# Patient Record
Sex: MALE | Race: WHITE | Employment: FULL TIME | URBAN - METROPOLITAN AREA
[De-identification: names, ages, dates, MRNs, and addresses within clinical notes are randomized per-mention and may not be internally consistent; named-entity substitution may affect disease eponyms.]

---

## 2023-09-22 ENCOUNTER — OFFICE VISIT (OUTPATIENT)
Dept: PRIMARY CARE CLINIC | Age: 53
End: 2023-09-22
Payer: COMMERCIAL

## 2023-09-22 VITALS
SYSTOLIC BLOOD PRESSURE: 131 MMHG | OXYGEN SATURATION: 99 % | TEMPERATURE: 98.5 F | WEIGHT: 300 LBS | BODY MASS INDEX: 38.5 KG/M2 | DIASTOLIC BLOOD PRESSURE: 81 MMHG | HEIGHT: 74 IN | HEART RATE: 100 BPM

## 2023-09-22 DIAGNOSIS — L03.116 CELLULITIS OF LEFT LOWER EXTREMITY: Primary | ICD-10-CM

## 2023-09-22 PROCEDURE — 99203 OFFICE O/P NEW LOW 30 MIN: CPT | Performed by: NURSE PRACTITIONER

## 2023-09-22 RX ORDER — CEPHALEXIN 500 MG/1
500 CAPSULE ORAL 3 TIMES DAILY
Qty: 21 CAPSULE | Refills: 0 | Status: SHIPPED | OUTPATIENT
Start: 2023-09-22 | End: 2023-09-29

## 2023-09-22 ASSESSMENT — ENCOUNTER SYMPTOMS
COUGH: 0
SHORTNESS OF BREATH: 0
RESPIRATORY NEGATIVE: 1
WHEEZING: 0
CHEST TIGHTNESS: 0

## 2023-09-22 NOTE — PROGRESS NOTES
[address] [facility] WALK IN CARE  [address] [place] [address] Road  [address] [place] [address]  Dept: [phone]  Dept Fax: [phone]     Gerardo Dominguez is a 48 y.o. male who presents to the urgent care today for his medicalconditions/complaints as noted below. Gerardo Dominguez is c/o of Swelling (Lt knee x last Saturday)    HPI:      Rash  This is a new problem. The current episode started in the past 7 days. The problem has been gradually worsening since onset. Location: left lower extremity from knee to ankle. The rash is characterized by redness and swelling. It is unknown if there was an exposure to a precipitant. Pertinent negatives include no cough, fatigue, fever or shortness of breath. Treatments tried: ice, elevation. The treatment provided no relief. No past medical history on file. Current Outpatient Medications   Medication Sig Dispense Refill    cephALEXin (KEFLEX) 500 MG capsule Take 1 capsule by mouth 3 times daily for 7 days 21 capsule 0     No current facility-administered medications for this visit. No Known Allergies    Reviewed PMH, SH, and  with the patient and updated. Subjective:      Review of Systems   Constitutional:  Negative for chills, fatigue and fever. Respiratory: Negative. Negative for cough, chest tightness, shortness of breath and wheezing. Cardiovascular:  Positive for leg swelling (left leg). Negative for chest pain. Musculoskeletal:  Positive for arthralgias (left knee, mild) and joint swelling (left knee and left lower leg). Negative for gait problem. Skin:  Positive for rash (left leg from knee to ankle). All other systems reviewed and are negative. Objective:      Physical Exam  Vitals and nursing note reviewed. Constitutional:       General: He is not in acute distress. Appearance: He is well-developed. He is not diaphoretic. HENT:      Head: Normocephalic and atraumatic.    Cardiovascular:

## 2023-09-27 ENCOUNTER — HOSPITAL ENCOUNTER (INPATIENT)
Age: 53
LOS: 3 days | Discharge: HOME | DRG: 501 | End: 2023-09-30
Attending: EMERGENCY MEDICINE | Admitting: STUDENT IN AN ORGANIZED HEALTH CARE EDUCATION/TRAINING PROGRAM
Payer: COMMERCIAL

## 2023-09-27 ENCOUNTER — HOSPITAL ENCOUNTER (OUTPATIENT)
Age: 53
Setting detail: SPECIMEN
Discharge: HOME | End: 2023-09-27

## 2023-09-27 ENCOUNTER — OFFICE VISIT (OUTPATIENT)
Dept: PRIMARY CARE CLINIC | Age: 53
End: 2023-09-27
Payer: COMMERCIAL

## 2023-09-27 ENCOUNTER — HOSPITAL ENCOUNTER (OUTPATIENT)
Age: 53
Discharge: HOME | End: 2023-09-29

## 2023-09-27 VITALS
OXYGEN SATURATION: 97 % | SYSTOLIC BLOOD PRESSURE: 124 MMHG | TEMPERATURE: 99.2 F | HEART RATE: 114 BPM | DIASTOLIC BLOOD PRESSURE: 86 MMHG

## 2023-09-27 DIAGNOSIS — R22.42 LOCALIZED SWELLING OF LEFT LOWER LEG: Primary | ICD-10-CM

## 2023-09-27 DIAGNOSIS — B99.9 INFECTION: ICD-10-CM

## 2023-09-27 DIAGNOSIS — L03.116 CELLULITIS OF LEFT LOWER EXTREMITY: Primary | ICD-10-CM

## 2023-09-27 DIAGNOSIS — M79.89 LOCALIZED SWELLING OF LOWER EXTREMITY: ICD-10-CM

## 2023-09-27 DIAGNOSIS — L03.116 LEFT LEG CELLULITIS: ICD-10-CM

## 2023-09-27 DIAGNOSIS — K74.60 CIRRHOSIS OF LIVER WITHOUT ASCITES, UNSPECIFIED HEPATIC CIRRHOSIS TYPE (HCC): ICD-10-CM

## 2023-09-27 DIAGNOSIS — M71.162 SEPTIC PREPATELLAR BURSITIS OF LEFT KNEE: ICD-10-CM

## 2023-09-27 LAB
ALBUMIN SERPL-MCNC: 2.5 G/DL (ref 3.5–5.2)
ALP SERPL-CCNC: 128 U/L (ref 40–129)
ALT SERPL-CCNC: 41 U/L (ref 5–41)
ANION GAP SERPL CALCULATED.3IONS-SCNC: 8 MMOL/L (ref 9–17)
AST SERPL-CCNC: 62 U/L
BASOPHILS # BLD: 0.08 K/UL (ref 0–0.2)
BASOPHILS NFR BLD: 1 % (ref 0–2)
BILIRUB SERPL-MCNC: 4.2 MG/DL (ref 0.3–1.2)
BUN SERPL-MCNC: 11 MG/DL (ref 6–20)
BUN/CREAT SERPL: 16 (ref 9–20)
CALCIUM SERPL-MCNC: 8.4 MG/DL (ref 8.6–10.4)
CHLORIDE SERPL-SCNC: 101 MMOL/L (ref 98–107)
CO2 SERPL-SCNC: 25 MMOL/L (ref 20–31)
CREAT SERPL-MCNC: 0.7 MG/DL (ref 0.7–1.2)
D DIMER PPP FEU-MCNC: 3.92 UG/ML FEU (ref 0–0.57)
EOSINOPHIL # BLD: 0.14 K/UL (ref 0–0.44)
EOSINOPHILS RELATIVE PERCENT: 1 % (ref 1–4)
ERYTHROCYTE [DISTWIDTH] IN BLOOD BY AUTOMATED COUNT: 13.5 % (ref 11.8–14.4)
GFR SERPL CREATININE-BSD FRML MDRD: >60 ML/MIN/1.73M2
GLUCOSE SERPL-MCNC: 98 MG/DL (ref 70–99)
HCT VFR BLD AUTO: 41.5 % (ref 40.7–50.3)
HGB BLD-MCNC: 13.8 G/DL (ref 13–17)
IMM GRANULOCYTES # BLD AUTO: 0.07 K/UL (ref 0–0.3)
IMM GRANULOCYTES NFR BLD: 1 %
LACTATE BLDV-SCNC: 1.7 MMOL/L (ref 0.5–2.2)
LYMPHOCYTES NFR BLD: 1.47 K/UL (ref 1.1–3.7)
LYMPHOCYTES RELATIVE PERCENT: 10 % (ref 24–43)
MAGNESIUM SERPL-MCNC: 1.7 MG/DL (ref 1.6–2.6)
MCH RBC QN AUTO: 34 PG (ref 25.2–33.5)
MCHC RBC AUTO-ENTMCNC: 33.3 G/DL (ref 28.4–34.8)
MCV RBC AUTO: 102.2 FL (ref 82.6–102.9)
MONOCYTES NFR BLD: 1.47 K/UL (ref 0.1–1.2)
MONOCYTES NFR BLD: 10 % (ref 3–12)
NEUTROPHILS NFR BLD: 77 % (ref 36–65)
NEUTS SEG NFR BLD: 12.01 K/UL (ref 1.5–8.1)
NRBC BLD-RTO: 0 PER 100 WBC
PLATELET # BLD AUTO: 192 K/UL (ref 138–453)
PMV BLD AUTO: 9.8 FL (ref 8.1–13.5)
POTASSIUM SERPL-SCNC: 4 MMOL/L (ref 3.7–5.3)
PROT SERPL-MCNC: 6.9 G/DL (ref 6.4–8.3)
RBC # BLD AUTO: 4.06 M/UL (ref 4.21–5.77)
SODIUM SERPL-SCNC: 134 MMOL/L (ref 135–144)
URATE SERPL-MCNC: 2.4 MG/DL (ref 3.4–7)
WBC OTHER # BLD: 15.2 K/UL (ref 3.5–11.3)

## 2023-09-27 PROCEDURE — 83735 ASSAY OF MAGNESIUM: CPT

## 2023-09-27 PROCEDURE — 99285 EMERGENCY DEPT VISIT HI MDM: CPT

## 2023-09-27 PROCEDURE — 6360000002 HC RX W HCPCS: Performed by: STUDENT IN AN ORGANIZED HEALTH CARE EDUCATION/TRAINING PROGRAM

## 2023-09-27 PROCEDURE — 83605 ASSAY OF LACTIC ACID: CPT

## 2023-09-27 PROCEDURE — 87040 BLOOD CULTURE FOR BACTERIA: CPT

## 2023-09-27 PROCEDURE — 2580000003 HC RX 258: Performed by: EMERGENCY MEDICINE

## 2023-09-27 PROCEDURE — 6360000002 HC RX W HCPCS: Performed by: EMERGENCY MEDICINE

## 2023-09-27 PROCEDURE — 85025 COMPLETE CBC W/AUTO DIFF WBC: CPT

## 2023-09-27 PROCEDURE — 0MBP0ZZ EXCISION OF LEFT KNEE BURSA AND LIGAMENT, OPEN APPROACH: ICD-10-PCS | Performed by: ORTHOPAEDIC SURGERY

## 2023-09-27 PROCEDURE — 36415 COLL VENOUS BLD VENIPUNCTURE: CPT

## 2023-09-27 PROCEDURE — 99222 1ST HOSP IP/OBS MODERATE 55: CPT | Performed by: STUDENT IN AN ORGANIZED HEALTH CARE EDUCATION/TRAINING PROGRAM

## 2023-09-27 PROCEDURE — 99213 OFFICE O/P EST LOW 20 MIN: CPT | Performed by: FAMILY MEDICINE

## 2023-09-27 PROCEDURE — 96365 THER/PROPH/DIAG IV INF INIT: CPT

## 2023-09-27 PROCEDURE — 80053 COMPREHEN METABOLIC PANEL: CPT

## 2023-09-27 PROCEDURE — 1200000000 HC SEMI PRIVATE

## 2023-09-27 RX ORDER — SODIUM CHLORIDE 0.9 % (FLUSH) 0.9 %
5-40 SYRINGE (ML) INJECTION EVERY 12 HOURS SCHEDULED
Status: DISCONTINUED | OUTPATIENT
Start: 2023-09-27 | End: 2023-09-30 | Stop reason: HOSPADM

## 2023-09-27 RX ORDER — SULFAMETHOXAZOLE AND TRIMETHOPRIM 800; 160 MG/1; MG/1
1 TABLET ORAL 2 TIMES DAILY
Qty: 20 TABLET | Refills: 0 | Status: ON HOLD | OUTPATIENT
Start: 2023-09-27 | End: 2023-09-30 | Stop reason: HOSPADM

## 2023-09-27 RX ORDER — SODIUM CHLORIDE 9 MG/ML
INJECTION, SOLUTION INTRAVENOUS PRN
Status: DISCONTINUED | OUTPATIENT
Start: 2023-09-27 | End: 2023-09-30 | Stop reason: HOSPADM

## 2023-09-27 RX ORDER — ACETAMINOPHEN 650 MG/1
650 SUPPOSITORY RECTAL EVERY 6 HOURS PRN
Status: DISCONTINUED | OUTPATIENT
Start: 2023-09-27 | End: 2023-09-29

## 2023-09-27 RX ORDER — ACETAMINOPHEN 325 MG/1
650 TABLET ORAL EVERY 6 HOURS PRN
Status: DISCONTINUED | OUTPATIENT
Start: 2023-09-27 | End: 2023-09-29

## 2023-09-27 RX ORDER — KETOROLAC TROMETHAMINE 30 MG/ML
15 INJECTION, SOLUTION INTRAMUSCULAR; INTRAVENOUS EVERY 6 HOURS PRN
Status: DISCONTINUED | OUTPATIENT
Start: 2023-09-27 | End: 2023-09-28

## 2023-09-27 RX ORDER — ONDANSETRON 2 MG/ML
4 INJECTION INTRAMUSCULAR; INTRAVENOUS EVERY 6 HOURS PRN
Status: DISCONTINUED | OUTPATIENT
Start: 2023-09-27 | End: 2023-09-30 | Stop reason: HOSPADM

## 2023-09-27 RX ORDER — ENOXAPARIN SODIUM 100 MG/ML
30 INJECTION SUBCUTANEOUS 2 TIMES DAILY
Status: DISCONTINUED | OUTPATIENT
Start: 2023-09-27 | End: 2023-09-30 | Stop reason: HOSPADM

## 2023-09-27 RX ORDER — SODIUM CHLORIDE 0.9 % (FLUSH) 0.9 %
5-40 SYRINGE (ML) INJECTION PRN
Status: DISCONTINUED | OUTPATIENT
Start: 2023-09-27 | End: 2023-09-30 | Stop reason: HOSPADM

## 2023-09-27 RX ORDER — ONDANSETRON 4 MG/1
4 TABLET, ORALLY DISINTEGRATING ORAL EVERY 8 HOURS PRN
Status: DISCONTINUED | OUTPATIENT
Start: 2023-09-27 | End: 2023-09-30 | Stop reason: HOSPADM

## 2023-09-27 RX ORDER — POLYETHYLENE GLYCOL 3350 17 G/17G
17 POWDER, FOR SOLUTION ORAL DAILY PRN
Status: DISCONTINUED | OUTPATIENT
Start: 2023-09-27 | End: 2023-09-30 | Stop reason: HOSPADM

## 2023-09-27 RX ADMIN — VANCOMYCIN HYDROCHLORIDE 2500 MG: 500 INJECTION, POWDER, LYOPHILIZED, FOR SOLUTION INTRAVENOUS at 20:18

## 2023-09-27 RX ADMIN — ENOXAPARIN SODIUM 30 MG: 100 INJECTION SUBCUTANEOUS at 21:29

## 2023-09-27 ASSESSMENT — ENCOUNTER SYMPTOMS
ABDOMINAL PAIN: 0
EYE REDNESS: 0
SHORTNESS OF BREATH: 0
DIARRHEA: 0
EYE PAIN: 0
VOMITING: 0
SORE THROAT: 0
COUGH: 0
BACK PAIN: 0

## 2023-09-27 ASSESSMENT — LIFESTYLE VARIABLES
HOW OFTEN DO YOU HAVE A DRINK CONTAINING ALCOHOL: 4 OR MORE TIMES A WEEK
HOW MANY STANDARD DRINKS CONTAINING ALCOHOL DO YOU HAVE ON A TYPICAL DAY: 5 OR 6

## 2023-09-27 NOTE — ED NOTES
Pt to er with c/o redness and swelling to left knee and lower leg. Pt states s/sx started 2 weeks ago. Pt denies injury. Pt states he was seen and put on keflex for cellulitis. Pt states s/sx were not getting any better so went back to urgent care today. Pt was given bactrim and had blood work done. Pt states he was told to come to ed to rule out blood clot d/t blood work. Pt has swelling and redness to left knee and lower leg. Pt has 1+ pitting edema noted to left knee area. Pt states he has been fatigued and sleeping a lot. Pt states he has been running fever with s/sx. Pt a&ox3. Skin warm and dry. Respirations even and non-labored.        Jennifer Milton RN  09/27/23 1818

## 2023-09-27 NOTE — PATIENT INSTRUCTIONS
Stop keflex and start bactrim as prescribed for cellulitis of the left leg  Have d-dimer and uric acid level done to rule out blood clot and gout. If symptoms worsen or do not improve then go to the ER as you may need IV antibiotics.

## 2023-09-28 ENCOUNTER — APPOINTMENT (OUTPATIENT)
Dept: VASCULAR LAB | Age: 53
DRG: 501 | End: 2023-09-28
Attending: STUDENT IN AN ORGANIZED HEALTH CARE EDUCATION/TRAINING PROGRAM
Payer: COMMERCIAL

## 2023-09-28 ENCOUNTER — APPOINTMENT (OUTPATIENT)
Dept: GENERAL RADIOLOGY | Age: 53
DRG: 501 | End: 2023-09-28
Payer: COMMERCIAL

## 2023-09-28 ENCOUNTER — APPOINTMENT (OUTPATIENT)
Dept: MRI IMAGING | Age: 53
DRG: 501 | End: 2023-09-28
Payer: COMMERCIAL

## 2023-09-28 PROBLEM — R79.89 ABNORMAL LIVER FUNCTION TEST: Status: ACTIVE | Noted: 2023-09-28

## 2023-09-28 PROBLEM — F10.90 ALCOHOL USE DISORDER: Status: ACTIVE | Noted: 2023-09-28

## 2023-09-28 PROBLEM — M70.52 PATELLAR BURSITIS OF LEFT KNEE: Status: ACTIVE | Noted: 2023-09-28

## 2023-09-28 PROBLEM — E66.01 CLASS 3 SEVERE OBESITY DUE TO EXCESS CALORIES WITHOUT SERIOUS COMORBIDITY IN ADULT (HCC): Status: ACTIVE | Noted: 2023-09-28

## 2023-09-28 PROBLEM — R03.0 ELEVATED BLOOD PRESSURE READING: Status: ACTIVE | Noted: 2023-09-28

## 2023-09-28 LAB
ANION GAP SERPL CALCULATED.3IONS-SCNC: 9 MMOL/L (ref 9–17)
BUN SERPL-MCNC: 11 MG/DL (ref 6–20)
BUN/CREAT SERPL: 22 (ref 9–20)
CALCIUM SERPL-MCNC: 8.3 MG/DL (ref 8.6–10.4)
CHLORIDE SERPL-SCNC: 102 MMOL/L (ref 98–107)
CO2 SERPL-SCNC: 25 MMOL/L (ref 20–31)
CREAT SERPL-MCNC: 0.5 MG/DL (ref 0.7–1.2)
ECHO BSA: 2.73 M2
ERYTHROCYTE [DISTWIDTH] IN BLOOD BY AUTOMATED COUNT: 13.8 % (ref 11.8–14.4)
GFR SERPL CREATININE-BSD FRML MDRD: >60 ML/MIN/1.73M2
GLUCOSE SERPL-MCNC: 82 MG/DL (ref 70–99)
HCT VFR BLD AUTO: 39.9 % (ref 40.7–50.3)
HGB BLD-MCNC: 13.1 G/DL (ref 13–17)
INR PPP: 1.9
MCH RBC QN AUTO: 34.6 PG (ref 25.2–33.5)
MCHC RBC AUTO-ENTMCNC: 32.8 G/DL (ref 28.4–34.8)
MCV RBC AUTO: 105.3 FL (ref 82.6–102.9)
NRBC BLD-RTO: 0 PER 100 WBC
PHOSPHATE SERPL-MCNC: 3.2 MG/DL (ref 2.5–4.5)
PLATELET # BLD AUTO: 151 K/UL (ref 138–453)
PMV BLD AUTO: 10.2 FL (ref 8.1–13.5)
POTASSIUM SERPL-SCNC: 4.1 MMOL/L (ref 3.7–5.3)
PROTHROMBIN TIME: 21.6 SEC (ref 11.5–14.2)
RBC # BLD AUTO: 3.79 M/UL (ref 4.21–5.77)
SODIUM SERPL-SCNC: 136 MMOL/L (ref 135–144)
VANCOMYCIN SERPL-MCNC: 24.6 UG/ML
VANCOMYCIN SERPL-MCNC: 8.2 UG/ML
WBC OTHER # BLD: 19.1 K/UL (ref 3.5–11.3)

## 2023-09-28 PROCEDURE — 99222 1ST HOSP IP/OBS MODERATE 55: CPT | Performed by: INTERNAL MEDICINE

## 2023-09-28 PROCEDURE — 6360000002 HC RX W HCPCS: Performed by: INTERNAL MEDICINE

## 2023-09-28 PROCEDURE — 85610 PROTHROMBIN TIME: CPT

## 2023-09-28 PROCEDURE — 99232 SBSQ HOSP IP/OBS MODERATE 35: CPT | Performed by: INTERNAL MEDICINE

## 2023-09-28 PROCEDURE — 93971 EXTREMITY STUDY: CPT | Performed by: SURGERY

## 2023-09-28 PROCEDURE — 6360000002 HC RX W HCPCS: Performed by: STUDENT IN AN ORGANIZED HEALTH CARE EDUCATION/TRAINING PROGRAM

## 2023-09-28 PROCEDURE — 2580000003 HC RX 258: Performed by: INTERNAL MEDICINE

## 2023-09-28 PROCEDURE — 84100 ASSAY OF PHOSPHORUS: CPT

## 2023-09-28 PROCEDURE — 73721 MRI JNT OF LWR EXTRE W/O DYE: CPT

## 2023-09-28 PROCEDURE — 99254 IP/OBS CNSLTJ NEW/EST MOD 60: CPT | Performed by: ORTHOPAEDIC SURGERY

## 2023-09-28 PROCEDURE — 80048 BASIC METABOLIC PNL TOTAL CA: CPT

## 2023-09-28 PROCEDURE — 6370000000 HC RX 637 (ALT 250 FOR IP): Performed by: INTERNAL MEDICINE

## 2023-09-28 PROCEDURE — 80202 ASSAY OF VANCOMYCIN: CPT

## 2023-09-28 PROCEDURE — 73562 X-RAY EXAM OF KNEE 3: CPT

## 2023-09-28 PROCEDURE — 1200000000 HC SEMI PRIVATE

## 2023-09-28 PROCEDURE — 93971 EXTREMITY STUDY: CPT

## 2023-09-28 PROCEDURE — 2580000003 HC RX 258: Performed by: STUDENT IN AN ORGANIZED HEALTH CARE EDUCATION/TRAINING PROGRAM

## 2023-09-28 PROCEDURE — 85027 COMPLETE CBC AUTOMATED: CPT

## 2023-09-28 PROCEDURE — 36415 COLL VENOUS BLD VENIPUNCTURE: CPT

## 2023-09-28 RX ADMIN — ENOXAPARIN SODIUM 30 MG: 100 INJECTION SUBCUTANEOUS at 10:06

## 2023-09-28 RX ADMIN — VANCOMYCIN HYDROCHLORIDE 1250 MG: 5 INJECTION, POWDER, LYOPHILIZED, FOR SOLUTION INTRAVENOUS at 10:32

## 2023-09-28 RX ADMIN — SODIUM CHLORIDE, PRESERVATIVE FREE 10 ML: 5 INJECTION INTRAVENOUS at 20:53

## 2023-09-28 RX ADMIN — PIPERACILLIN AND TAZOBACTAM 3375 MG: 3; .375 INJECTION, POWDER, LYOPHILIZED, FOR SOLUTION INTRAVENOUS at 05:59

## 2023-09-28 RX ADMIN — CEFTRIAXONE SODIUM 1000 MG: 1 INJECTION, POWDER, FOR SOLUTION INTRAMUSCULAR; INTRAVENOUS at 09:38

## 2023-09-28 RX ADMIN — ENOXAPARIN SODIUM 30 MG: 100 INJECTION SUBCUTANEOUS at 20:53

## 2023-09-28 RX ADMIN — Medication 10 MG: at 18:45

## 2023-09-28 RX ADMIN — SODIUM CHLORIDE 25 ML: 9 INJECTION, SOLUTION INTRAVENOUS at 09:36

## 2023-09-28 RX ADMIN — PIPERACILLIN AND TAZOBACTAM 4500 MG: 4; .5 INJECTION, POWDER, LYOPHILIZED, FOR SOLUTION INTRAVENOUS at 00:16

## 2023-09-28 RX ADMIN — SODIUM CHLORIDE, PRESERVATIVE FREE 10 ML: 5 INJECTION INTRAVENOUS at 09:33

## 2023-09-28 RX ADMIN — VANCOMYCIN HYDROCHLORIDE 1250 MG: 5 INJECTION, POWDER, LYOPHILIZED, FOR SOLUTION INTRAVENOUS at 20:56

## 2023-09-28 RX ADMIN — SODIUM CHLORIDE, PRESERVATIVE FREE 10 ML: 5 INJECTION INTRAVENOUS at 05:57

## 2023-09-28 NOTE — H&P
[facility]  Office: [phone]  Maryse Mehnaz, DO, Leo Curet, DO, Marino Hidalgo, DO, Jil Cole, DO, Layla Haney MD, Chris Clark MD, Jacqueline De La Paz MD, Aleksandr Shepherd MD,  Faustino Anderson MD, Rc Lubin MD, Donna Marie, DO, Orestes Riley MD,  Juan Caban MD, Soha Hobson MD, Ana Marroquin DO, Severo Arceo MD,  Cira Beltran DO, Carlos Hobbs MD, Rai Jain MD, Mary Henderson MD, Tadeo Verma MD,  Vesta Munoz MD, Tyrone Landeros MD, Carole Antonio MD, Arelis Walls MD, Vito Lozano, DO, Philis Halsted, MD,  Yola Martinez MD, Orlando Wright MD, Rick Rivera, CNP,  Eneida Curry, CNP,, Taqueria Ray, CNP,  Cheryle Balding, DNP, Valdemar Peabody, CNP, Esau Sinclair, CNP, Genny Coles, CNP, Stephanie Erickson, CNP, Yeny Rose, CNP, Christy Sullivan, CNP, Maryana French, CNS, Haile Gallego, CNP, Ally Castro, [facility]    HISTORY AND PHYSICAL EXAMINATION            Date:   9/27/2023  Patient name:  Steffany Chambers  Date of admission:  9/27/2023  5:47 PM  MRN:   [id number]  Account:  [de-identified]  YOB: 1970  PCP:    No primary care provider on file. Room:   [place] [address]  Code Status:    Full Code    Chief Complaint:     Chief Complaint   Patient presents with    Leg Swelling     Pt states he has been having Lt leg swelling for a few a few months, off and on antibiotics, possible cellulitis. History Obtained From:     patient and spouse    History of Present Illness:     Steffany Chambers is a 48 y.o. Non- / non  male who presents with Leg Swelling (Pt states he has been having Lt leg swelling for a few a few months, off and on antibiotics, possible cellulitis. )   and is admitted to the hospital for the management of Left leg cellulitis.     59-year-old male with no significant past medical history presents emergency department for left lower Range    Uric Acid 2.4 (L) 3.4 - 7.0 mg/dL   D-Dimer, Quantitative    Collection Time: 09/27/23 12:56 PM   Result Value Ref Range    D-Dimer, Quant 3.92 (H) 0.00 - 0.57 ug/mL FEU   CBC with Auto Differential    Collection Time: 09/27/23  6:48 PM   Result Value Ref Range    WBC 15.2 (H) 3.5 - 11.3 k/uL    RBC 4.06 (L) 4.21 - 5.77 m/uL    Hemoglobin 13.8 13.0 - 17.0 g/dL    Hematocrit 41.5 40.7 - 50.3 %    .2 82.6 - 102.9 fL    MCH 34.0 (H) 25.2 - 33.5 pg    MCHC 33.3 28.4 - 34.8 g/dL    RDW 13.5 11.8 - 14.4 %    Platelets 317 169 - 789 k/uL    MPV 9.8 8.1 - 13.5 fL    NRBC Automated 0.0 0.0 per 100 WBC    Neutrophils % 77 (H) 36 - 65 %    Lymphocytes % 10 (L) 24 - 43 %    Monocytes % 10 3 - 12 %    Eosinophils % 1 1 - 4 %    Basophils % 1 0 - 2 %    Immature Granulocytes 1 (H) 0 %    Neutrophils Absolute 12.01 (H) 1.50 - 8.10 k/uL    Lymphocytes Absolute 1.47 1.10 - 3.70 k/uL    Monocytes Absolute 1.47 (H) 0.10 - 1.20 k/uL    Eosinophils Absolute 0.14 0.00 - 0.44 k/uL    Basophils Absolute 0.08 0.00 - 0.20 k/uL    Absolute Immature Granulocyte 0.07 0.00 - 0.30 k/uL   CMP    Collection Time: 09/27/23  6:48 PM   Result Value Ref Range    Sodium 134 (L) 135 - 144 mmol/L    Potassium 4.0 3.7 - 5.3 mmol/L    Chloride 101 98 - 107 mmol/L    CO2 25 20 - 31 mmol/L    Anion Gap 8 (L) 9 - 17 mmol/L    Glucose 98 70 - 99 mg/dL    BUN 11 6 - 20 mg/dL    Creatinine 0.7 0.7 - 1.2 mg/dL    Est, Glom Filt Rate >60 >60 mL/min/1.73m2    Bun/Cre Ratio 16 9 - 20    Calcium 8.4 (L) 8.6 - 10.4 mg/dL    Total Protein 6.9 6.4 - 8.3 g/dL    Albumin 2.5 (L) 3.5 - 5.2 g/dL    Total Bilirubin 4.2 (H) 0.3 - 1.2 mg/dL    Alkaline Phosphatase 128 40 - 129 U/L    ALT 41 5 - 41 U/L    AST 62 (H) <40 U/L   Magnesium    Collection Time: 09/27/23  6:48 PM   Result Value Ref Range    Magnesium 1.7 1.6 - 2.6 mg/dL   Lactic Acid    Collection Time: 09/27/23  6:48 PM   Result Value Ref Range    Lactic Acid 1.7 0.5 - 2.2 mmol/L

## 2023-09-28 NOTE — PROGRESS NOTES
[facility]  Office: [phone]  Felecia Flynn, DO, Jax Pelayo, DO, Perri Cole, DO, Ellis Nicolas MD, Kyle Mobley MD, Briana Henriquez MD, Arcelia Libman, MD,  Loreto Lake MD, Yousif Bridges MD, Katie French DO, Genevieve Chirinos MD,  Dawson Sandy MD, Rosita Chand MD, Richard Florian DO, Araceli Rojas MD,  Didier Maria DO, Rudy Lott MD, Yaquelin Merrill MD, Monse Morris MD, Amalia Meza MD,  Jarek Villanueva MD, Romi Dela Cruz MD, Stacia Baker MD, Miguel Goldman MD, Ale Arellano DO, Fatemeh Castillo MD,  Jacobo Pagan MD, Zacarias Kirk MD, Sandeep Abbasi, CNP,  Lakeisha Siu, CNP,, Jackie Stallworth, CNP,  Arabella Melvin, [facility], Jailene Vasques, CNP, Sarah Stanford, CNP, Rob Ramsey, CNP, Roseanne Cadena, CNP, Randall Fountain, CNP, Heraclio Coats, CNP, Mariel Vasquez, CNS, Ines Darden, CNP, Enoch Fan, [facility]    Progress Note    9/28/2023    1:08 PM    Name:   Carola Sargent  MRN:     [id number]     Acct:      [de-identified]   Room:   2018/2018-02  IP Day:  1  Admit Date:  9/27/2023  5:47 PM    PCP:   No primary care provider on file. Code Status:  Full Code    Subjective:     Patient having some left leg pain with erythema and redness extending from his ankle to his knee. Patient says he tried Keflex but this did not improve his symptoms. He did have some associated fevers at home with a Tmax of 102.3 along with some subjective chills. He has no knee pain and is able to flex and extend his knee without any issue. He says he tends to work outdoors but denies any trauma to the area. Brief History: This is a 55-year-old male who presents to the hospital for evaluation of left leg pain and swelling. Patient was diagnosed with cellulitis and failed outpatient antibiotics with Keflex.   He presented to our hospital for worsening pain and \"POCPH\", \"PHART\", \"PH\", \"POCPCO2\", \"JVR0IHC\", \"PCO2\", \"POCPO2\", \"PO2ART\", \"PO2\", \"POCHCO3\", \"NYE9INU\", \"HCO3\", \"NBEA\", \"PBEA\", \"BEART\", \"BE\", \"THGBART\", \"THB\", \"ZEQ2TMY\", \"LBZK5XPS\", \"S8RLTTKM\", \"O2SAT\", \"FIO2\"  Lab Results   Component Value Date/Time    SPECIAL LT AC, 12 ML 09/27/2023 06:48 PM     Lab Results   Component Value Date/Time    CULTURE NO GROWTH 12 HOURS 09/27/2023 06:48 PM       Radiology:  No results found. Physical Examination:     General appearance:  alert, cooperative and uncomfortable appearing male in bed  Mental Status:  oriented to person, place and time and normal affect  Lungs:  clear to auscultation bilaterally, normal effort  Heart:  regular rate and rhythm, no murmur  Abdomen:  soft, nontender, nondistended, normal bowel sounds, no masses, hepatomegaly, splenomegaly  Extremities: Left leg edema, pulses palpable, full range of motion with flexion extension of the knee  Skin: Area of induration with erythema extending from the ankle to the knee    Assessment:     Hospital Problems             Last Modified POA    * (Principal) Left leg cellulitis 9/27/2023 Yes    Heavy alcohol use 9/28/2023 Yes    Class 3 severe obesity due to excess calories without serious comorbidity in adult [facility]) 9/28/2023 Yes    Abnormal liver function test 9/28/2023 Yes    Elevated blood pressure reading 9/28/2023 Yes       Plan:     Left leg swelling likely due to  cellulitis vs septic bursitis   Failed Keflex, continue Vancomycin/Rocephin  Lower ext dopplers pending , ordered in ED  Keep legs wrapped/elevated. Suspected cholestatic liver injury  with abnormal liver function tests likely due to alcoholic liver disease vs GABRIEL vs other. Total bilirubin: 4.2, INR: 1.5 albumin 2.5  Check ultrasound liver/spleen, ama, alpha one antitrypsin   Alcohol use   Discussed ETOH cessation with patient, drinks ~6 beers per day.  He wants to try quitting on his own without medications  Monitor for withdrawal. symptoms  Obesity BMI 40 due to excess calories  Recommend weight loss, some modification  Elevated blood pressure reading without history of hypertension  Needs to be followed up outpatient  PT OT  DVT prophylaxis    Medical Decision Making: Medium    Low risk for [address] [address],   9/28/2023  1:08 PM

## 2023-09-28 NOTE — CONSULTS
[facility]  [address]  [address] [facility] [address]  Dept: [phone]  Loc: [phone]    Inpatient Orthopedic Consult      CHIEF COMPLAINT:    left knee pain    HISTORY OF PRESENT ILLNESS:      The patient is a 48 y.o. male who is being seen for evaluation of the above, which began around 9/20/2023 atraumatically  . The patient localizes the pain to the above location. Prior to this, the patient used no assistive devices for ambulation. The patient reports an associated swelling and erythema. History is obtained today from:   [x]  the patient     [x]  EMR     [x]  one family member/friend    []  multiple family members/friends    [x]  other: I spoke with Dr. Ellie Helton regarding the patient's case         REVIEW OF SYSTEMS:  Constitutional: Negative for fever. HENT: Negative for tinnitus. Eyes: Negative for pain. Respiratory: Negative for shortness of breath. Cardiovascular: Negative for chest pain. Gastrointestinal: Negative for abdominal pain. Genitourinary: Negative for dysuria. Skin: Negative for rash. Neurological: Negative for headaches. Hematological: Does not bruise/bleed easily.    Musculoskeletal: See HPI for pertinent positives     Past Medical History:    Past Medical History:   Diagnosis Date    Pneumonia        Past Surgical History:    Past Surgical History:   Procedure Laterality Date    KNEE SURGERY Right     WISDOM TOOTH EXTRACTION         Current Medications:   Current Facility-Administered Medications   Medication Dose Route Frequency Provider Last Rate Last Admin    cefTRIAXone (ROCEPHIN) 1,000 mg in sodium chloride 0.9 % 50 mL IVPB (mini-bag)  1,000 mg IntraVENous Q24H Christian Ashton MD   Stopped at 09/28/23 1008    sodium chloride flush 0.9 % injection 5-40 mL  5-40 mL IntraVENous 2 times per day Magali Reddy MD   10 mL at 09/28/23 0933    sodium chloride flush 0.9 % injection 5-40 mL  5-40 mL IntraVENous PRN Glenn

## 2023-09-28 NOTE — PROGRESS NOTES
Patient admitted to room  2018 From ER via stretcher with belongings . VS as charted  Patient oriented to room and call light. Assessment as charted. Admission database done. Orders and plan of care reviewed. Call light in reach. Will continue to monitor.

## 2023-09-28 NOTE — CONSULTS
[facility]  Initial Consult Note  Date: 9/28/2023    Hospital day :1     Impression:   Left knee prepatellar bursitis with surrounding cellulitis involving the left lower extremity/leg  Morbid obesity  EtOH abuse  Suspected cholestatic liver injury secondary to alcoholic liver disease versus GABRIEL    Recommendations   Clinical picture seems consistent with a prepatellar bursitis with associated cellulitis, It could also be possible that the patient has a septic arthritis but this is felt to be less likely  These infections are typically related to staphylococcal and streptococcal organisms  I will narrow the antimicrobial therapy to Rocephin from Zosyn and continue the vancomycin for now  The care was discussed with the primary team and I have recommended a an orthopedic evaluation for prepatellar bursitis versus MRI imaging of the left knee    Chief complaint/reason for consultation:   Lower extremity cellulitis    History of Present Illness:   Theodoro Klinefelter is a 48y.o.-year-old male who was initially admitted on 9/27/2023. Carolina Benoit is morbidly obese, has EtOH abuse and reports that about 2 weeks ago on Friday he started with some left-sided knee pain which he did not think too much of but over the weekend the knee pain got worse to the point that on Wednesday he is did not go to work stayed at home and by Friday he ended up going into an urgent care center where he was started on oral antibiotic therapy with cephalexin and he reports that he thought that initially it was getting better but by Monday his knee was bigger so he went to his primary care physician who ordered a different antibiotic in the primary care physician and also done lab testing that came back with an elevated D-dimer and due to concerns for a blood clot the patient was sent into the emergency department for further evaluation. He does report some fever but no chills. He has had some fatigue.   Lab testing showed a white

## 2023-09-28 NOTE — PLAN OF CARE
Patient admitted with LLE cellulitis for IV atb. Patient failed outpatient ATB. IV zosyn and vanco. Venous scan to LLE ordered. Problem: Discharge Planning  Goal: Discharge to home or other facility with appropriate resources  Outcome: Progressing  Flowsheets (Taken 9/27/2023 2212)  Discharge to home or other facility with appropriate resources: Identify barriers to discharge with patient and caregiver     Problem: Skin/Tissue Integrity  Goal: Absence of new skin breakdown  Description: 1. Monitor for areas of redness and/or skin breakdown  2. Assess vascular access sites hourly  3. Every 4-6 hours minimum:  Change oxygen saturation probe site  4. Every 4-6 hours:  If on nasal continuous positive airway pressure, respiratory therapy assess nares and determine need for appliance change or resting period.   Outcome: Progressing     Problem: Pain  Goal: Verbalizes/displays adequate comfort level or baseline comfort level  Outcome: Progressing  Flowsheets (Taken 9/28/2023 0328)  Verbalizes/displays adequate comfort level or baseline comfort level:   Encourage patient to monitor pain and request assistance   Administer analgesics based on type and severity of pain and evaluate response   Consider cultural and social influences on pain and pain management   Assess pain using appropriate pain scale   Implement non-pharmacological measures as appropriate and evaluate response

## 2023-09-28 NOTE — CARE COORDINATION
Case Management Assessment  Initial Evaluation    Date/Time of Evaluation: 9/28/2023 1:15 PM  Assessment Completed by: Washington Frey RN    If patient is discharged prior to next notation, then this note serves as note for discharge by case management. Patient Name: Modesto Meyer                   YOB: 1970  Diagnosis: Left leg cellulitis [G91.868]  Cellulitis of left lower extremity [K71.130]                   Date / Time: 9/27/2023  5:47 PM    Patient Admission Status: Inpatient   Readmission Risk (Low < 19, Mod (19-27), High > 27): Readmission Risk Score: 6.3    Current PCP: No primary care provider on file. PCP verified by CM? No [facility] list given)    Chart Reviewed: Yes      History Provided by: Patient  Patient Orientation: Alert and Oriented    Patient Cognition: Alert    Hospitalization in the last 30 days (Readmission):  No    If yes, Readmission Assessment in CM Navigator will be completed. Advance Directives:      Code Status: Full Code   Patient's Primary Decision Maker is: Legal Next of Kin      Discharge Planning:    Patient lives with: Spouse/Significant Other Type of Home: House  Primary Care Giver: Self  Patient Support Systems include: Spouse/Significant Other, Family Members, Friends/Neighbors   Current Financial resources: Other (Comment) (White )  Current community resources: None  Current services prior to admission: None            Current DME:  none            Type of Home Care services:  None    ADLS  Prior functional level: Independent in ADLs/IADLs  Current functional level: Independent in ADLs/IADLs    PT AM-PAC:   /24  OT AM-PAC:   /24    Family can provide assistance at DC: Yes  Would you like Case Management to discuss the discharge plan with any other family members/significant others, and if so, who?  Yes (SO)  Plans to Return to Present Housing: Yes  Other Identified Issues/Barriers to RETURNING to current housing: none  Potential Assistance needed at discharge: N/A            Potential DME:  none  Patient expects to discharge to: [address] for transportation at discharge: Other (see comment) (SO)    Financial    Payor: [employer] Berenice Mullins / Plan: Pj Charles / Product Type: *No Product type* /     Does insurance require precert for SNF: Yes    Potential assistance Purchasing Medications: No  Meds-to-Beds request: Yes      [facility] [address] [facility] [place] [id number] - [address] [facility], [address] [place] [address] Drive [phone] - F [phone] [address]  [address] [place] [id number]  Phone: [phone] Fax: [phone]      Notes:    Factors facilitating achievement of predicted outcomes: Family support, Motivated, Cooperative, and Pleasant    Barriers to discharge: Decreased endurance and Lower extremity weakness    Additional Case Management Notes: Met with patient and SO at bedside. Lives with SO, independent and works full time. Admitted for lt leg cellulitis. Has been on PO ATB since last Friday and failed OP treatment. Currently on IV Pagan and Rocephin. Uses no DME at home. Venous scan negative for DVT. US gallbladder, spleen and liver ordered for elevated bilirubin and possible cirrhosis. ID consulted and continue to follow plan of care. The Plan for Transition of Care is related to the following treatment goals of Left leg cellulitis [X64.607]  Cellulitis of left lower extremity [L88.353]    IF APPLICABLE: The Patient and/or patient representative Katerin Lau and his family were provided with a choice of provider and agrees with the discharge plan. Freedom of choice list with basic dialogue that supports the patient's individualized plan of care/goals and shares the quality data associated with the providers was provided to:  (N/A)   Patient Representative Name:       The Patient and/or Patient Representative Agree with the Discharge Plan?       Janeen Arambula RN  Case Management Department  Ph:  Fax:

## 2023-09-29 ENCOUNTER — ANESTHESIA EVENT (OUTPATIENT)
Dept: OPERATING ROOM | Age: 53
End: 2023-09-29
Payer: COMMERCIAL

## 2023-09-29 ENCOUNTER — APPOINTMENT (OUTPATIENT)
Dept: ULTRASOUND IMAGING | Age: 53
DRG: 501 | End: 2023-09-29
Payer: COMMERCIAL

## 2023-09-29 ENCOUNTER — ANESTHESIA (OUTPATIENT)
Dept: OPERATING ROOM | Age: 53
End: 2023-09-29
Payer: COMMERCIAL

## 2023-09-29 PROBLEM — M71.162 SEPTIC PREPATELLAR BURSITIS OF LEFT KNEE: Status: ACTIVE | Noted: 2023-09-29

## 2023-09-29 PROBLEM — K74.60 CIRRHOSIS OF LIVER (HCC): Status: ACTIVE | Noted: 2023-09-29

## 2023-09-29 LAB
A1AT SERPL-MCNC: 101 MG/DL (ref 90–200)
ANION GAP SERPL CALCULATED.3IONS-SCNC: 8 MMOL/L (ref 9–17)
BASOPHILS # BLD: 0.06 K/UL (ref 0–0.2)
BASOPHILS NFR BLD: 1 % (ref 0–2)
BUN SERPL-MCNC: 10 MG/DL (ref 6–20)
BUN/CREAT SERPL: 17 (ref 9–20)
CALCIUM SERPL-MCNC: 8.2 MG/DL (ref 8.6–10.4)
CHLORIDE SERPL-SCNC: 100 MMOL/L (ref 98–107)
CO2 SERPL-SCNC: 25 MMOL/L (ref 20–31)
CREAT SERPL-MCNC: 0.6 MG/DL (ref 0.7–1.2)
EOSINOPHIL # BLD: 0.17 K/UL (ref 0–0.44)
EOSINOPHILS RELATIVE PERCENT: 2 % (ref 1–4)
ERYTHROCYTE [DISTWIDTH] IN BLOOD BY AUTOMATED COUNT: 13.9 % (ref 11.8–14.4)
GFR SERPL CREATININE-BSD FRML MDRD: >60 ML/MIN/1.73M2
GLUCOSE SERPL-MCNC: 122 MG/DL (ref 70–99)
HAV IGM SERPL QL IA: NONREACTIVE
HBV CORE IGM SERPL QL IA: NONREACTIVE
HBV SURFACE AG SERPL QL IA: NONREACTIVE
HCT VFR BLD AUTO: 41.8 % (ref 40.7–50.3)
HCV AB SERPL QL IA: NONREACTIVE
HGB BLD-MCNC: 13.4 G/DL (ref 13–17)
IMM GRANULOCYTES # BLD AUTO: 0.06 K/UL (ref 0–0.3)
IMM GRANULOCYTES NFR BLD: 1 %
LYMPHOCYTES NFR BLD: 0.87 K/UL (ref 1.1–3.7)
LYMPHOCYTES RELATIVE PERCENT: 8 % (ref 24–43)
MCH RBC QN AUTO: 33.9 PG (ref 25.2–33.5)
MCHC RBC AUTO-ENTMCNC: 32.1 G/DL (ref 28.4–34.8)
MCV RBC AUTO: 105.8 FL (ref 82.6–102.9)
MONOCYTES NFR BLD: 0.33 K/UL (ref 0.1–1.2)
MONOCYTES NFR BLD: 3 % (ref 3–12)
NEUTROPHILS NFR BLD: 86 % (ref 36–65)
NEUTS SEG NFR BLD: 9.3 K/UL (ref 1.5–8.1)
NRBC BLD-RTO: 0 PER 100 WBC
PLATELET # BLD AUTO: 146 K/UL (ref 138–453)
PMV BLD AUTO: 9.9 FL (ref 8.1–13.5)
POTASSIUM SERPL-SCNC: 4.3 MMOL/L (ref 3.7–5.3)
RBC # BLD AUTO: 3.95 M/UL (ref 4.21–5.77)
RBC # BLD: ABNORMAL 10*6/UL
SODIUM SERPL-SCNC: 133 MMOL/L (ref 135–144)
WBC OTHER # BLD: 10.8 K/UL (ref 3.5–11.3)

## 2023-09-29 PROCEDURE — 87075 CULTR BACTERIA EXCEPT BLOOD: CPT

## 2023-09-29 PROCEDURE — 87070 CULTURE OTHR SPECIMN AEROBIC: CPT

## 2023-09-29 PROCEDURE — 97530 THERAPEUTIC ACTIVITIES: CPT

## 2023-09-29 PROCEDURE — 7100000000 HC PACU RECOVERY - FIRST 15 MIN: Performed by: ORTHOPAEDIC SURGERY

## 2023-09-29 PROCEDURE — 87186 SC STD MICRODIL/AGAR DIL: CPT

## 2023-09-29 PROCEDURE — 80048 BASIC METABOLIC PNL TOTAL CA: CPT

## 2023-09-29 PROCEDURE — 80074 ACUTE HEPATITIS PANEL: CPT

## 2023-09-29 PROCEDURE — 82105 ALPHA-FETOPROTEIN SERUM: CPT

## 2023-09-29 PROCEDURE — 97116 GAIT TRAINING THERAPY: CPT

## 2023-09-29 PROCEDURE — 2580000003 HC RX 258: Performed by: STUDENT IN AN ORGANIZED HEALTH CARE EDUCATION/TRAINING PROGRAM

## 2023-09-29 PROCEDURE — 6360000002 HC RX W HCPCS: Performed by: NURSE ANESTHETIST, CERTIFIED REGISTERED

## 2023-09-29 PROCEDURE — 85025 COMPLETE CBC W/AUTO DIFF WBC: CPT

## 2023-09-29 PROCEDURE — 3700000000 HC ANESTHESIA ATTENDED CARE: Performed by: ORTHOPAEDIC SURGERY

## 2023-09-29 PROCEDURE — 97162 PT EVAL MOD COMPLEX 30 MIN: CPT

## 2023-09-29 PROCEDURE — 97165 OT EVAL LOW COMPLEX 30 MIN: CPT

## 2023-09-29 PROCEDURE — 2580000003 HC RX 258: Performed by: ORTHOPAEDIC SURGERY

## 2023-09-29 PROCEDURE — 3600000012 HC SURGERY LEVEL 2 ADDTL 15MIN: Performed by: ORTHOPAEDIC SURGERY

## 2023-09-29 PROCEDURE — 88304 TISSUE EXAM BY PATHOLOGIST: CPT

## 2023-09-29 PROCEDURE — 3600000002 HC SURGERY LEVEL 2 BASE: Performed by: ORTHOPAEDIC SURGERY

## 2023-09-29 PROCEDURE — 1200000000 HC SEMI PRIVATE

## 2023-09-29 PROCEDURE — 2580000003 HC RX 258

## 2023-09-29 PROCEDURE — 87205 SMEAR GRAM STAIN: CPT

## 2023-09-29 PROCEDURE — 83516 IMMUNOASSAY NONANTIBODY: CPT

## 2023-09-29 PROCEDURE — 6360000002 HC RX W HCPCS

## 2023-09-29 PROCEDURE — 27340 REMOVAL OF KNEECAP BURSA: CPT | Performed by: ORTHOPAEDIC SURGERY

## 2023-09-29 PROCEDURE — 6370000000 HC RX 637 (ALT 250 FOR IP): Performed by: ORTHOPAEDIC SURGERY

## 2023-09-29 PROCEDURE — 87102 FUNGUS ISOLATION CULTURE: CPT

## 2023-09-29 PROCEDURE — 87206 SMEAR FLUORESCENT/ACID STAI: CPT

## 2023-09-29 PROCEDURE — 99232 SBSQ HOSP IP/OBS MODERATE 35: CPT | Performed by: NURSE PRACTITIONER

## 2023-09-29 PROCEDURE — 2500000003 HC RX 250 WO HCPCS

## 2023-09-29 PROCEDURE — 76705 ECHO EXAM OF ABDOMEN: CPT

## 2023-09-29 PROCEDURE — 2709999900 HC NON-CHARGEABLE SUPPLY: Performed by: ORTHOPAEDIC SURGERY

## 2023-09-29 PROCEDURE — 86403 PARTICLE AGGLUT ANTBDY SCRN: CPT

## 2023-09-29 PROCEDURE — 6360000002 HC RX W HCPCS: Performed by: ORTHOPAEDIC SURGERY

## 2023-09-29 PROCEDURE — 82103 ALPHA-1-ANTITRYPSIN TOTAL: CPT

## 2023-09-29 PROCEDURE — 3700000001 HC ADD 15 MINUTES (ANESTHESIA): Performed by: ORTHOPAEDIC SURGERY

## 2023-09-29 PROCEDURE — 99232 SBSQ HOSP IP/OBS MODERATE 35: CPT | Performed by: INTERNAL MEDICINE

## 2023-09-29 PROCEDURE — 7100000001 HC PACU RECOVERY - ADDTL 15 MIN: Performed by: ORTHOPAEDIC SURGERY

## 2023-09-29 PROCEDURE — 6370000000 HC RX 637 (ALT 250 FOR IP): Performed by: INTERNAL MEDICINE

## 2023-09-29 PROCEDURE — 36415 COLL VENOUS BLD VENIPUNCTURE: CPT

## 2023-09-29 PROCEDURE — 97535 SELF CARE MNGMENT TRAINING: CPT

## 2023-09-29 RX ORDER — OXYCODONE HYDROCHLORIDE AND ACETAMINOPHEN 5; 325 MG/1; MG/1
1 TABLET ORAL EVERY 6 HOURS PRN
Status: DISCONTINUED | OUTPATIENT
Start: 2023-09-29 | End: 2023-09-30 | Stop reason: HOSPADM

## 2023-09-29 RX ORDER — SODIUM CHLORIDE 9 MG/ML
INJECTION, SOLUTION INTRAVENOUS PRN
Status: DISCONTINUED | OUTPATIENT
Start: 2023-09-29 | End: 2023-09-29 | Stop reason: HOSPADM

## 2023-09-29 RX ORDER — ONDANSETRON 2 MG/ML
INJECTION INTRAMUSCULAR; INTRAVENOUS PRN
Status: DISCONTINUED | OUTPATIENT
Start: 2023-09-29 | End: 2023-09-29 | Stop reason: SDUPTHER

## 2023-09-29 RX ORDER — MAGNESIUM HYDROXIDE 1200 MG/15ML
LIQUID ORAL CONTINUOUS PRN
Status: COMPLETED | OUTPATIENT
Start: 2023-09-29 | End: 2023-09-29

## 2023-09-29 RX ORDER — OXYCODONE HYDROCHLORIDE AND ACETAMINOPHEN 5; 325 MG/1; MG/1
1 TABLET ORAL EVERY 6 HOURS PRN
Qty: 20 TABLET | Refills: 0 | Status: SHIPPED | OUTPATIENT
Start: 2023-09-29 | End: 2023-10-06

## 2023-09-29 RX ORDER — PROPOFOL 10 MG/ML
INJECTION, EMULSION INTRAVENOUS PRN
Status: DISCONTINUED | OUTPATIENT
Start: 2023-09-29 | End: 2023-09-29 | Stop reason: SDUPTHER

## 2023-09-29 RX ORDER — FENTANYL CITRATE 50 UG/ML
25 INJECTION, SOLUTION INTRAMUSCULAR; INTRAVENOUS EVERY 5 MIN PRN
Status: DISCONTINUED | OUTPATIENT
Start: 2023-09-29 | End: 2023-09-29 | Stop reason: HOSPADM

## 2023-09-29 RX ORDER — SODIUM CHLORIDE 0.9 % (FLUSH) 0.9 %
5-40 SYRINGE (ML) INJECTION EVERY 12 HOURS SCHEDULED
Status: DISCONTINUED | OUTPATIENT
Start: 2023-09-29 | End: 2023-09-29 | Stop reason: HOSPADM

## 2023-09-29 RX ORDER — FENTANYL CITRATE 50 UG/ML
INJECTION, SOLUTION INTRAMUSCULAR; INTRAVENOUS PRN
Status: DISCONTINUED | OUTPATIENT
Start: 2023-09-29 | End: 2023-09-29 | Stop reason: SDUPTHER

## 2023-09-29 RX ORDER — CEPHALEXIN 500 MG/1
500 CAPSULE ORAL 3 TIMES DAILY
Status: ON HOLD | COMMUNITY
End: 2023-09-30 | Stop reason: HOSPADM

## 2023-09-29 RX ORDER — ONDANSETRON 2 MG/ML
4 INJECTION INTRAMUSCULAR; INTRAVENOUS
Status: DISCONTINUED | OUTPATIENT
Start: 2023-09-29 | End: 2023-09-29 | Stop reason: HOSPADM

## 2023-09-29 RX ORDER — SODIUM CHLORIDE 0.9 % (FLUSH) 0.9 %
5-40 SYRINGE (ML) INJECTION PRN
Status: DISCONTINUED | OUTPATIENT
Start: 2023-09-29 | End: 2023-09-29 | Stop reason: HOSPADM

## 2023-09-29 RX ORDER — LIDOCAINE HYDROCHLORIDE 20 MG/ML
INJECTION, SOLUTION EPIDURAL; INFILTRATION; INTRACAUDAL; PERINEURAL PRN
Status: DISCONTINUED | OUTPATIENT
Start: 2023-09-29 | End: 2023-09-29 | Stop reason: SDUPTHER

## 2023-09-29 RX ORDER — MORPHINE SULFATE 2 MG/ML
2 INJECTION, SOLUTION INTRAMUSCULAR; INTRAVENOUS EVERY 4 HOURS PRN
Status: DISCONTINUED | OUTPATIENT
Start: 2023-09-29 | End: 2023-09-30 | Stop reason: HOSPADM

## 2023-09-29 RX ORDER — OXYCODONE HYDROCHLORIDE 5 MG/1
10 TABLET ORAL EVERY 4 HOURS PRN
Status: DISCONTINUED | OUTPATIENT
Start: 2023-09-29 | End: 2023-09-29

## 2023-09-29 RX ORDER — OXYCODONE HYDROCHLORIDE 5 MG/1
5 TABLET ORAL EVERY 4 HOURS PRN
Status: DISCONTINUED | OUTPATIENT
Start: 2023-09-29 | End: 2023-09-29

## 2023-09-29 RX ORDER — OXYCODONE HYDROCHLORIDE AND ACETAMINOPHEN 5; 325 MG/1; MG/1
1 TABLET ORAL EVERY 6 HOURS PRN
Status: DISCONTINUED | OUTPATIENT
Start: 2023-09-29 | End: 2023-09-29

## 2023-09-29 RX ORDER — OXYCODONE HYDROCHLORIDE AND ACETAMINOPHEN 5; 325 MG/1; MG/1
2 TABLET ORAL EVERY 6 HOURS PRN
Status: DISCONTINUED | OUTPATIENT
Start: 2023-09-29 | End: 2023-09-30 | Stop reason: HOSPADM

## 2023-09-29 RX ORDER — ASPIRIN 325 MG
325 TABLET, DELAYED RELEASE (ENTERIC COATED) ORAL DAILY
Qty: 42 TABLET | Refills: 0 | Status: SHIPPED | OUTPATIENT
Start: 2023-09-29

## 2023-09-29 RX ORDER — MIDAZOLAM HYDROCHLORIDE 1 MG/ML
INJECTION INTRAMUSCULAR; INTRAVENOUS PRN
Status: DISCONTINUED | OUTPATIENT
Start: 2023-09-29 | End: 2023-09-29 | Stop reason: SDUPTHER

## 2023-09-29 RX ORDER — ASPIRIN 81 MG/1
81 TABLET ORAL DAILY
Status: ON HOLD | COMMUNITY
End: 2023-09-30 | Stop reason: HOSPADM

## 2023-09-29 RX ORDER — GINSENG 100 MG
CAPSULE ORAL PRN
Status: DISCONTINUED | OUTPATIENT
Start: 2023-09-29 | End: 2023-09-29 | Stop reason: ALTCHOICE

## 2023-09-29 RX ORDER — CARVEDILOL 3.12 MG/1
3.12 TABLET ORAL 2 TIMES DAILY WITH MEALS
Status: DISCONTINUED | OUTPATIENT
Start: 2023-09-29 | End: 2023-09-30 | Stop reason: HOSPADM

## 2023-09-29 RX ORDER — HYDROMORPHONE HYDROCHLORIDE 1 MG/ML
0.5 INJECTION, SOLUTION INTRAMUSCULAR; INTRAVENOUS; SUBCUTANEOUS EVERY 5 MIN PRN
Status: DISCONTINUED | OUTPATIENT
Start: 2023-09-29 | End: 2023-09-29 | Stop reason: HOSPADM

## 2023-09-29 RX ORDER — DEXAMETHASONE SODIUM PHOSPHATE 10 MG/ML
INJECTION, SOLUTION INTRAMUSCULAR; INTRAVENOUS PRN
Status: DISCONTINUED | OUTPATIENT
Start: 2023-09-29 | End: 2023-09-29 | Stop reason: SDUPTHER

## 2023-09-29 RX ORDER — SODIUM CHLORIDE, SODIUM LACTATE, POTASSIUM CHLORIDE, CALCIUM CHLORIDE 600; 310; 30; 20 MG/100ML; MG/100ML; MG/100ML; MG/100ML
INJECTION, SOLUTION INTRAVENOUS CONTINUOUS PRN
Status: DISCONTINUED | OUTPATIENT
Start: 2023-09-29 | End: 2023-09-29 | Stop reason: SDUPTHER

## 2023-09-29 RX ADMIN — MIDAZOLAM 2 MG: 1 INJECTION INTRAMUSCULAR; INTRAVENOUS at 07:34

## 2023-09-29 RX ADMIN — CEFTRIAXONE SODIUM 1000 MG: 1 INJECTION, POWDER, FOR SOLUTION INTRAMUSCULAR; INTRAVENOUS at 08:23

## 2023-09-29 RX ADMIN — FENTANYL CITRATE 25 MCG: 50 INJECTION INTRAMUSCULAR; INTRAVENOUS at 07:48

## 2023-09-29 RX ADMIN — CARVEDILOL 3.12 MG: 3.12 TABLET, FILM COATED ORAL at 17:12

## 2023-09-29 RX ADMIN — VANCOMYCIN HYDROCHLORIDE 1250 MG: 5 INJECTION, POWDER, LYOPHILIZED, FOR SOLUTION INTRAVENOUS at 08:35

## 2023-09-29 RX ADMIN — SODIUM CHLORIDE, PRESERVATIVE FREE 10 ML: 5 INJECTION INTRAVENOUS at 07:25

## 2023-09-29 RX ADMIN — OXYCODONE AND ACETAMINOPHEN 1 TABLET: 5; 325 TABLET ORAL at 10:50

## 2023-09-29 RX ADMIN — FENTANYL CITRATE 25 MCG: 50 INJECTION INTRAMUSCULAR; INTRAVENOUS at 08:13

## 2023-09-29 RX ADMIN — PROPOFOL 100 MG: 10 INJECTION, EMULSION INTRAVENOUS at 07:49

## 2023-09-29 RX ADMIN — DEXAMETHASONE SODIUM PHOSPHATE 10 MG: 10 INJECTION, SOLUTION INTRAMUSCULAR; INTRAVENOUS at 08:25

## 2023-09-29 RX ADMIN — FENTANYL CITRATE 100 MCG: 50 INJECTION INTRAMUSCULAR; INTRAVENOUS at 07:43

## 2023-09-29 RX ADMIN — VANCOMYCIN HYDROCHLORIDE 1250 MG: 5 INJECTION, POWDER, LYOPHILIZED, FOR SOLUTION INTRAVENOUS at 22:09

## 2023-09-29 RX ADMIN — SODIUM CHLORIDE, POTASSIUM CHLORIDE, SODIUM LACTATE AND CALCIUM CHLORIDE: 600; 310; 30; 20 INJECTION, SOLUTION INTRAVENOUS at 07:36

## 2023-09-29 RX ADMIN — LIDOCAINE HYDROCHLORIDE 100 MG: 20 INJECTION, SOLUTION EPIDURAL; INFILTRATION; INTRACAUDAL; PERINEURAL at 07:45

## 2023-09-29 RX ADMIN — OXYCODONE AND ACETAMINOPHEN 1 TABLET: 5; 325 TABLET ORAL at 17:12

## 2023-09-29 RX ADMIN — FENTANYL CITRATE 50 MCG: 50 INJECTION INTRAMUSCULAR; INTRAVENOUS at 08:51

## 2023-09-29 RX ADMIN — ENOXAPARIN SODIUM 30 MG: 100 INJECTION SUBCUTANEOUS at 22:06

## 2023-09-29 RX ADMIN — PROPOFOL 200 MG: 10 INJECTION, EMULSION INTRAVENOUS at 07:45

## 2023-09-29 RX ADMIN — SODIUM CHLORIDE, PRESERVATIVE FREE 10 ML: 5 INJECTION INTRAVENOUS at 22:06

## 2023-09-29 RX ADMIN — ONDANSETRON 4 MG: 2 INJECTION INTRAMUSCULAR; INTRAVENOUS at 08:28

## 2023-09-29 ASSESSMENT — PAIN DESCRIPTION - ORIENTATION
ORIENTATION: LEFT
ORIENTATION: LEFT;LOWER

## 2023-09-29 ASSESSMENT — PAIN DESCRIPTION - LOCATION
LOCATION: LEG
LOCATION: LEG;KNEE
LOCATION: KNEE;LEG

## 2023-09-29 ASSESSMENT — PAIN - FUNCTIONAL ASSESSMENT
PAIN_FUNCTIONAL_ASSESSMENT: ACTIVITIES ARE NOT PREVENTED
PAIN_FUNCTIONAL_ASSESSMENT: PREVENTS OR INTERFERES SOME ACTIVE ACTIVITIES AND ADLS
PAIN_FUNCTIONAL_ASSESSMENT: ACTIVITIES ARE NOT PREVENTED

## 2023-09-29 ASSESSMENT — PAIN DESCRIPTION - PAIN TYPE
TYPE: SURGICAL PAIN
TYPE: SURGICAL PAIN

## 2023-09-29 ASSESSMENT — PAIN DESCRIPTION - DESCRIPTORS
DESCRIPTORS: ACHING
DESCRIPTORS: SORE
DESCRIPTORS: ACHING
DESCRIPTORS: BURNING

## 2023-09-29 ASSESSMENT — PAIN SCALES - GENERAL
PAINLEVEL_OUTOF10: 4
PAINLEVEL_OUTOF10: 3
PAINLEVEL_OUTOF10: 3
PAINLEVEL_OUTOF10: 4
PAINLEVEL_OUTOF10: 1
PAINLEVEL_OUTOF10: 0

## 2023-09-29 ASSESSMENT — PAIN SCALES - WONG BAKER: WONGBAKER_NUMERICALRESPONSE: 0

## 2023-09-29 ASSESSMENT — ENCOUNTER SYMPTOMS
GASTROINTESTINAL NEGATIVE: 1
RESPIRATORY NEGATIVE: 1

## 2023-09-29 NOTE — PROGRESS NOTES
[address] NOTE    Room # 2018/2018-02   Name: Fide Contreras              Reason for visit: Routine    I visited the patient. Admit Date & Time: 9/27/2023  5:47 PM    Assessment:  Fide Contreras is a 48 y.o. male. Upon entering the room patient and bed out of the room. Intervention:   provided a ministry presence and brief prayer. Outcome:  Patient did not respond. Plan:  Chaplains will remain available to offer spiritual and emotional support as needed. Electronically signed by Chaplain Kay, on 9/29/2023 at 10:30 AM.  [address]      09/29/23 1028   Encounter Summary   Encounter Overview/Reason  Initial Encounter   Service Provided For: Patient not available   Referral/Consult From: [place]   Last Encounter  09/29/23   Complexity of Encounter Low   Begin Time 0915   End Time  0916   Total Time Calculated 1 min   Assessment/Intervention/Outcome   Assessment Unable to assess   Intervention Prayer (assurance of)/[place];Sustaining Presence/Ministry of presence

## 2023-09-29 NOTE — PROGRESS NOTES
Physical Therapy  Facility/Department: [facility] MED SURG  Physical Therapy Initial Assessment    Name: Kimberly Agarwal  : 1970  MRN: [id number]  Date of Service: 2023    Discharge Recommendations:  Home with assist PRN      HPI (per chart):  Soila Rios is morbidly obese, has EtOH abuse and reports that about 2 weeks ago on Friday he started with some left-sided knee pain which he did not think too much of but over the weekend the knee pain got worse to the point that on Wednesday he is did not go to work stayed at home and by Friday he ended up going into an urgent care center where he was started on oral antibiotic therapy with cephalexin and he reports that he thought that initially it was getting better but by Monday his knee was bigger so he went to his primary care physician who ordered a different antibiotic in the primary care physician and also done lab testing that came back with an elevated D-dimer and due to concerns for a blood clot the patient was sent into the emergency department for further evaluation. He does report some fever but no chills. He has had some fatigue. Patient Diagnosis(es): The primary encounter diagnosis was Cellulitis of left lower extremity. Diagnoses of Left leg cellulitis, Infection, and Septic prepatellar bursitis of left knee were also pertinent to this visit. Past Medical History:  has a past medical history of Pneumonia. Past Surgical History:  has a past surgical history that includes knee surgery (Right); [place] tooth extraction; and knee surgery (Left, 2023). Assessment   Body Structures, Functions, Activity Limitations Requiring Skilled Therapeutic Intervention: Decreased functional mobility ; Decreased ROM; Decreased safe awareness  Assessment: Patient is typically indep and works as an . Patient currently able to amb in room with supervision. Patient will benefit from additional education to improve safety awareness.   Specific Instructions for Next

## 2023-09-29 NOTE — PROGRESS NOTES
Physician Progress Note      Karuna Alvarez  CSN #:                  [id number]  :                       1970  ADMIT DATE:       2023 5:47 PM  [facility] DATE:  RESPONDING  PROVIDER #:        Jelena Bishop MD          QUERY TEXT:    Patient admitted with BMI 40.44. If possible, please make selection below,    document in progress notes and discharge summary if you are evaluating and /or   treating any of the following: The medical record reflects the following:  Risk Factors: smoker, limited mobility  Clinical Indicators: Ht 6ft 2 in , Wt 315 lbs  BMI 40.44  Treatment: Fall precautions, bariatric equipment and weight based medications   as required, lifestyle modification information available upon request    Thank you,  Dave Calero BSN, RN, [facility], [place]  Clinical   P: [phone]  F: [phone]  Options provided:  -- Morbid obesity  -- Severe obesity  -- Other - I will add my own diagnosis  -- Disagree - Not applicable / Not valid  -- Disagree - Clinically unable to determine / Unknown  -- Refer to Clinical Documentation Reviewer    PROVIDER RESPONSE TEXT:    This patient has morbid obesity. Query created by: Kathie Adams on 2023 11:27 AM      Electronically signed by:   Jelena Bishop MD 2023 10:11 PM

## 2023-09-29 NOTE — ANESTHESIA PRE PROCEDURE
72 hours. Coags:   Lab Results   Component Value Date/Time    PROTIME 21.6 09/28/2023 09:38 AM    INR 1.9 09/28/2023 09:38 AM       HCG (If Applicable): No results found for: \"PREGTESTUR\", \"PREGSERUM\", \"HCG\", \"HCGQUANT\"     ABGs: No results found for: \"PHART\", \"PO2ART\", \"TBL6NPN\", \"HZR1RTP\", \"BEART\", \"H0WTAWTV\"     Type & Screen (If Applicable):  No results found for: \"LABABO\", \"LABRH\"    Drug/Infectious Status (If Applicable):  No results found for: \"HIV\", \"HEPCAB\"    COVID-19 Screening (If Applicable): No results found for: \"COVID19\"        Anesthesia Evaluation  Patient summary reviewed and Nursing notes reviewed no history of anesthetic complications:   Airway: Mallampati: II  TM distance: >3 FB   Neck ROM: full  Mouth opening: > = 3 FB   Dental:          Pulmonary:       (-) COPD and asthma                           Cardiovascular:  Exercise tolerance: good (>4 METS),       (-) past MI, CAD, CABG/stent and  angina        Rate: normal                    Neuro/Psych:      (-) CVA           GI/Hepatic/Renal:        (-) GERD       Endo/Other:        (-) diabetes mellitus               Abdominal:             Vascular: Other Findings:           Anesthesia Plan      general     ASA 3       Induction: intravenous. MIPS: Postoperative opioids intended and Prophylactic antiemetics administered. Anesthetic plan and risks discussed with patient. Plan discussed with CRNA.     Attending anesthesiologist reviewed and agrees with Preprocedure content                Abdirahman Wise DO   9/29/2023

## 2023-09-29 NOTE — ANESTHESIA POSTPROCEDURE EVALUATION
Department of Anesthesiology  Postprocedure Note    Patient: Pepe Brown  MRN: [id number]  YOB: 1970  Date of evaluation: 9/29/2023      Procedure Summary     Date: 09/29/23 Room / Location: [address] [place] [address] - INPATIENT    Anesthesia Start: 9362 Anesthesia Stop: 1943    Procedure: 1. Left knee prepatellar irrigation and debridement with bursectomy (Left: Knee) Diagnosis:       Infection      (Infection [B99.9])    Surgeons: Mere Ledbetter MD Responsible Provider: Bennett Domínguez DO    Anesthesia Type: general ASA Status: 3          Anesthesia Type: No value filed.     Uma Phase I: Uma Score: 10    Uma Phase II:        Anesthesia Post Evaluation    Patient location during evaluation: PACU  Patient participation: complete - patient participated  Level of consciousness: awake and alert  Airway patency: patent  Nausea & Vomiting: no nausea and no vomiting  Complications: no  Cardiovascular status: hemodynamically stable  Respiratory status: acceptable  Hydration status: stable  Pain management: adequate

## 2023-09-29 NOTE — PROGRESS NOTES
Postop PLAN Note    Patient:  Gretchen Brar  YOB: 1970     48 y.o. male      Plan:      Idalia Spaulding to begin daily dry dressing changes on POD2, okay to reinforce/change earlier as needed as needed saturation  -WB'ing status: WBAT  -Pain control  -signed pain medicine prescription in chart  -DVT ppx  -Early mobilization  -signed DVT ppx medicine prescription in chart  -okay to begin postop day 1 from an orthopedic perspective  -Dispo  -ok to discharge from an Ortho perspective, once antibiotic management arranged  -   -f/u in office in 2 weeks

## 2023-09-29 NOTE — PLAN OF CARE
Problem: Discharge Planning  Goal: Discharge to home or other facility with appropriate resources  Outcome: Progressing  Flowsheets (Taken 9/29/2023 1988)  Discharge to home or other facility with appropriate resources:   Identify barriers to discharge with patient and caregiver   Arrange for needed discharge resources and transportation as appropriate   Identify discharge learning needs (meds, wound care, etc)   Refer to discharge planning if patient needs post-hospital services based on physician order or complex needs related to functional status, cognitive ability or social support system     Problem: Skin/Tissue Integrity  Goal: Absence of new skin breakdown  Description: 1. Monitor for areas of redness and/or skin breakdown  2. Assess vascular access sites hourly  3. Every 4-6 hours minimum:  Change oxygen saturation probe site  4. Every 4-6 hours:  If on nasal continuous positive airway pressure, respiratory therapy assess nares and determine need for appliance change or resting period.   Outcome: Progressing     Problem: Pain  Goal: Verbalizes/displays adequate comfort level or baseline comfort level  Outcome: Progressing

## 2023-09-29 NOTE — PROGRESS NOTES
Transitions of Care Pharmacy Service   Medication Review    The patient's list of current home medications has been reviewed. Source(s) of information: spoke to patient and sure scripts     Based on information provided by the above source(s), I have updated the patient's home med list as described below. I changed or updated the following medications on the patient's home medication list:  Discontinued oxyCODONE-acetaminophen (PERCOCET) 5-325 MG per tablet  aspirin 325 MG EC tablet     Added aspirin 81 MG EC tablet  Polyethylene Glycol 400 (VISINE DRY EYE RELIEF OP)     Adjusted   None      Other Notes Patient did not complete all daily doses of cephALEXin (KEFLEX) 500 MG capsule and sulfamethoxazole-trimethoprim (BACTRIM DS;SEPTRA DS) 800-160 MG per tablet           Please feel free to call me with any questions about this encounter. Thank you. This note will be reviewed and co-signed by the Transitions of Care Pharmacist. The pharmacist will review inpatient orders and contact the physician about any discrepancies.     Nereida Warren, pharmacy technician  Transitions of [facility] Pharmacy Service  Phone:  [phone]  Fax: [phone]      Electronically signed by Nereida Warren on 9/29/2023 at 12:06 PM       Prior to Admission medications    Medication Sig   aspirin 81 MG EC tablet   Take 1 tablet by mouth daily   Polyethylene Glycol 400 (VISINE DRY EYE RELIEF OP) Apply 2 drops to eye daily   sulfamethoxazole-trimethoprim (BACTRIM DS;SEPTRA DS) 800-160 MG per tablet Take 1 tablet by mouth 2 times daily   For 10 days starting 9/27/23   cephALEXin (KEFLEX) 500 MG capsule Take 1 capsule by mouth 3 times daily   For 7 days starting 9/22/23

## 2023-09-29 NOTE — PROGRESS NOTES
[facility]  Progress Note    Irving Hall  MRN: [id number]  Date: 9/29/2023  LOS: 2     Reason for F/U :   Left knee prepatellar bursitis    Impression :   Left knee prepatellar bursitis with surrounding cellulitis involving the left lower extremity/leg  Status post left knee prepatellar irrigation and debridement with bursectomy 9/29/2023  Morbid obesity  Alcohol abuse  Suspected cholestatic liver injury secondary to alcoholic liver disease versus GABRIEL    Recommendations: The patient continues on IV ceftriaxone and vancomycin  The patient went to the operating room today with the orthopedic team for a left knee prepatellar irrigation and debridement with bursectomy  Culture data thus far with gram-positive cocci in pairs, chains, clusters, suspect this is likely Staphylococcus aureus  Continue to follow culture data for final antibiotic recommendations    Infection Control Recommendations:   Universal precautions    Discharge Planning:   Estimated Length of IV antimicrobials: To be determined  Patient will need Midline Catheter Insertion/ PICC line Insertion: No  Patient will need: Home IV , [address] [place],  SNF,  LTAC: Undetermined  Patient willneed outpatient wound care: No    Medical Decision making / Summary of Stay:   Irving Hall is a 48y.o.-year-old male who was initially admitted on 9/27/2023.    Barbara Amaral is morbidly obese, has EtOH abuse and reports that about 2 weeks ago on Friday he started with some left-sided knee pain which he did not think too much of but over the weekend the knee pain got worse to the point that on Wednesday he is did not go to work stayed at home and by Friday he ended up going into an urgent care center where he was started on oral antibiotic therapy with cephalexin and he reports that he thought that initially it was getting better but by Monday his knee was bigger so he went to his primary care physician who ordered a different antibiotic in the primary care extrapolated by contextual diversion.

## 2023-09-29 NOTE — PROGRESS NOTES
I spoke to the patient before heading to the OR, and the operative site was identified, verified and marked. The procedure was verified. We have reviewed the risks, benefits, and alternatives to the procedure. No guarantees were made. I have also reviewed the history and physical. There are no significant changes in medical history. All questions were answered and they wish to proceed as planned. Medical clearance reviewed, and discussed with primary team/medical doctor. MRI reviewed, both images and report.     Electronically signed by Jorge Borrero MD

## 2023-09-29 NOTE — PROGRESS NOTES
Occupational Therapy  Facility/Department: [facility] MED SURG  Occupational Therapy Initial Assessment    Name: Irving Hall  : 1970  MRN: [id number]  Date of Service: 2023    RN reports patient is medically stable for therapy treatment this date. Chart reviewed prior to treatment and patient is agreeable for therapy. All lines intact and patient positioned comfortably at end of treatment. All patient needs addressed prior to ending therapy session. Discharge Recommendations:  Home with assist PRN  OT Equipment Recommendations  Equipment Needed: No    PER HPI: Mr. Irving Hall is a 48 y.o. male who was seen recently for the above problem. He has a history of left knee pain secondary to prepatellar septic bursitis with surrounding cellulitis, with low suspicion for septic arthritis. Notably, he has the past medical history as above. He has a history of morbid obesity (BMI greater than 40) and heavy alcohol use with abnormal liver function test per EMR. He is currently medically stable and appropriate for the planned procedure. We did decide to proceed with surgery, given his extensive erythema down his tibia, high white count of over 19, and reported systemic symptoms (reports history of fevers). We have spoken about the risks/benefits/alternatives to a surgical intervention, verbal understanding of these risks was expressed, and informed consent was obtained. All questions were answered. No guarantees were made or implied. I have answered all questions, and they wish to proceed with surgical intervention. Patient Diagnosis(es): The primary encounter diagnosis was Cellulitis of left lower extremity. Diagnoses of Left leg cellulitis, Infection, and Septic prepatellar bursitis of left knee were also pertinent to this visit. Past Medical History:  has a past medical history of Pneumonia. Past Surgical History:  has a past surgical history that includes knee surgery (Right);  [place] tooth falls in the past year or any fall with injury in the past year?: No  ADL Assistance: Independent  Homemaking Assistance: Independent  Ambulation Assistance: Independent  Transfer Assistance: Independent  Active : Yes  Occupation: Full time employment  Type of Occupation:   Leisure & Hobbies: Hunting, fishing, golfing  Additional Comments: Wife is professor of neuroscience for [employer]       Objective  Last taken vitals:   Pulse: 97  Heart Rate Source: Monitor  BP: (!) 151/76  BP Location: Left Arm  Patient Position: Semi fowlers  MAP (Calculated): 101  Respirations: 20  SpO2: 93 %  O2 Device: None (Room air)          Observation/Palpation  Posture: Good  Observation: Patient reports sensation WFL. Left knee ace wrapped. Safety Devices  Type of Devices: All fall risk precautions in place;Call light within reach;Gait belt;Left in chair;Nurse notified  Restraints  Restraints Initially in Place: No    Bed Mobility Training  Bed Mobility Training: Yes  Overall Level of Assistance: Stand-by assistance (HOB elevated, bed rails.)  Interventions: Verbal cues (For safety)  Rolling: Stand-by assistance  Supine to Sit: Stand-by assistance  Sit to Supine: Other (comment) (Pt sitting up in bedside recliner following)  Scooting: Stand-by assistance    Balance  Sitting: Intact (Seated EOB without support)  Standing: High guard (SBA standing in room)    Transfer Training  Transfer Training: Yes  Overall Level of Assistance: Stand-by assistance (From EOB and bedside chair)  Interventions: Verbal cues (Safety, pacing)  Sit to Stand: Stand-by assistance  Stand to Sit: Stand-by assistance  Toilet Transfer: Stand-by assistance    Functional Mobility:   Overall Level of Assistance: Stand-by assistance (From EOB to door and bathroom and back to bedside chair.  No AD.)  Interventions: Verbal cues (Pacing, safety.)  Assistive Device: Gait belt     AROM: Within functional limits (Full ROM in all planes)  Strength:

## 2023-09-29 NOTE — PLAN OF CARE
Uneventful night. No complaints of pain. Patient continues on IV atb- vanco and rocephin. Patient to have surgery this afternoon on left knee. Problem: Discharge Planning  Goal: Discharge to home or other facility with appropriate resources  9/29/2023 0258 by Obed Runner, RN  Outcome: Progressing  Flowsheets (Taken 9/29/2023 0258)  Discharge to home or other facility with appropriate resources:   Identify barriers to discharge with patient and caregiver   Identify discharge learning needs (meds, wound care, etc)   Refer to discharge planning if patient needs post-hospital services based on physician order or complex needs related to functional status, cognitive ability or social support system   Arrange for needed discharge resources and transportation as appropriate   Arrange for interpreters to assist at discharge as needed     Problem: Skin/Tissue Integrity  Goal: Absence of new skin breakdown  Description: 1. Monitor for areas of redness and/or skin breakdown  2. Assess vascular access sites hourly  3. Every 4-6 hours minimum:  Change oxygen saturation probe site  4. Every 4-6 hours:  If on nasal continuous positive airway pressure, respiratory therapy assess nares and determine need for appliance change or resting period.   9/29/2023 0258 by Obed Runner, RN  Outcome: Progressing     Problem: Pain  Goal: Verbalizes/displays adequate comfort level or baseline comfort level  9/29/2023 0258 by Obed Runner, RN  Outcome: Progressing  Flowsheets (Taken 9/29/2023 0258)  Verbalizes/displays adequate comfort level or baseline comfort level:   Encourage patient to monitor pain and request assistance   Consider cultural and social influences on pain and pain management   Assess pain using appropriate pain scale   Implement non-pharmacological measures as appropriate and evaluate response

## 2023-09-29 NOTE — PROGRESS NOTES
[facility]  Progress Note      NAME:  Tamera Wyatt  MEDICAL RECORD NUMBER:  [id number]  AGE: 48 y.o. GENDER: male  : 1970  TODAY'S DATE:  2023    Consult received for \"Chronic Wound Infection\" case discussed with nursing - patient without wounds. To OR today with ortho service - dressing orders in place post-op. [id number] to sign off, please re-consult if future wound needs arise.

## 2023-09-30 VITALS
SYSTOLIC BLOOD PRESSURE: 126 MMHG | WEIGHT: 315 LBS | TEMPERATURE: 97.2 F | OXYGEN SATURATION: 95 % | BODY MASS INDEX: 40.43 KG/M2 | HEIGHT: 74 IN | DIASTOLIC BLOOD PRESSURE: 75 MMHG | RESPIRATION RATE: 20 BRPM | HEART RATE: 81 BPM

## 2023-09-30 LAB
BUN SERPL-MCNC: 10 MG/DL (ref 6–20)
CREAT SERPL-MCNC: 0.5 MG/DL (ref 0.7–1.2)
GFR SERPL CREATININE-BSD FRML MDRD: >60 ML/MIN/1.73M2
MICROORGANISM SPEC CULT: NORMAL
MICROORGANISM/AGENT SPEC: NORMAL
SPECIMEN DESCRIPTION: NORMAL

## 2023-09-30 PROCEDURE — 2580000003 HC RX 258: Performed by: ORTHOPAEDIC SURGERY

## 2023-09-30 PROCEDURE — 99232 SBSQ HOSP IP/OBS MODERATE 35: CPT | Performed by: INTERNAL MEDICINE

## 2023-09-30 PROCEDURE — 82565 ASSAY OF CREATININE: CPT

## 2023-09-30 PROCEDURE — 6370000000 HC RX 637 (ALT 250 FOR IP): Performed by: INTERNAL MEDICINE

## 2023-09-30 PROCEDURE — 6360000002 HC RX W HCPCS: Performed by: ORTHOPAEDIC SURGERY

## 2023-09-30 PROCEDURE — 36415 COLL VENOUS BLD VENIPUNCTURE: CPT

## 2023-09-30 PROCEDURE — 99239 HOSP IP/OBS DSCHRG MGMT >30: CPT | Performed by: INTERNAL MEDICINE

## 2023-09-30 PROCEDURE — 6370000000 HC RX 637 (ALT 250 FOR IP): Performed by: ORTHOPAEDIC SURGERY

## 2023-09-30 PROCEDURE — 84520 ASSAY OF UREA NITROGEN: CPT

## 2023-09-30 RX ORDER — SULFAMETHOXAZOLE AND TRIMETHOPRIM 800; 160 MG/1; MG/1
1 TABLET ORAL 2 TIMES DAILY
Qty: 14 TABLET | Refills: 0 | Status: SHIPPED | OUTPATIENT
Start: 2023-09-30 | End: 2023-10-07

## 2023-09-30 RX ORDER — LINEZOLID 600 MG/1
600 TABLET, FILM COATED ORAL 2 TIMES DAILY
Qty: 14 TABLET | Refills: 0 | Status: SHIPPED | OUTPATIENT
Start: 2023-09-30 | End: 2023-09-30 | Stop reason: HOSPADM

## 2023-09-30 RX ORDER — CARVEDILOL 3.12 MG/1
3.12 TABLET ORAL 2 TIMES DAILY WITH MEALS
Qty: 60 TABLET | Refills: 3 | Status: SHIPPED | OUTPATIENT
Start: 2023-09-30

## 2023-09-30 RX ADMIN — SODIUM CHLORIDE 25 ML: 9 INJECTION, SOLUTION INTRAVENOUS at 09:57

## 2023-09-30 RX ADMIN — OXYCODONE AND ACETAMINOPHEN 1 TABLET: 5; 325 TABLET ORAL at 18:24

## 2023-09-30 RX ADMIN — CEFTRIAXONE SODIUM 1000 MG: 1 INJECTION, POWDER, FOR SOLUTION INTRAMUSCULAR; INTRAVENOUS at 09:58

## 2023-09-30 RX ADMIN — CARVEDILOL 3.12 MG: 3.12 TABLET, FILM COATED ORAL at 09:50

## 2023-09-30 RX ADMIN — CARVEDILOL 3.12 MG: 3.12 TABLET, FILM COATED ORAL at 17:36

## 2023-09-30 RX ADMIN — ENOXAPARIN SODIUM 30 MG: 100 INJECTION SUBCUTANEOUS at 09:50

## 2023-09-30 RX ADMIN — SODIUM CHLORIDE, PRESERVATIVE FREE 10 ML: 5 INJECTION INTRAVENOUS at 09:51

## 2023-09-30 RX ADMIN — VANCOMYCIN HYDROCHLORIDE 1250 MG: 5 INJECTION, POWDER, LYOPHILIZED, FOR SOLUTION INTRAVENOUS at 11:04

## 2023-09-30 ASSESSMENT — ENCOUNTER SYMPTOMS
GASTROINTESTINAL NEGATIVE: 1
RESPIRATORY NEGATIVE: 1

## 2023-09-30 ASSESSMENT — PAIN SCALES - GENERAL: PAINLEVEL_OUTOF10: 5

## 2023-09-30 ASSESSMENT — PAIN - FUNCTIONAL ASSESSMENT: PAIN_FUNCTIONAL_ASSESSMENT: PREVENTS OR INTERFERES SOME ACTIVE ACTIVITIES AND ADLS

## 2023-09-30 ASSESSMENT — PAIN DESCRIPTION - DESCRIPTORS: DESCRIPTORS: SQUEEZING;SHARP

## 2023-09-30 ASSESSMENT — PAIN DESCRIPTION - ORIENTATION: ORIENTATION: LEFT;LOWER

## 2023-09-30 NOTE — DISCHARGE INSTRUCTIONS
-Make an appointment with your primary care physician within 1 week of discharge for evaluation of your symptoms.  -Follow-up with gastroenterology for EGD and management of liver disease  -Avoid alcohol use  -Follow-up with orthopedic surgery Dr. Nava Acevedo in 2 weeks  -ASA for DVT ppx for 2 weeks  No dressing changes needed.

## 2023-09-30 NOTE — PLAN OF CARE
Problem: Discharge Planning  Goal: Discharge to home or other facility with appropriate resources  Outcome: Progressing  Flowsheets (Taken 9/30/2023 1021)  Discharge to home or other facility with appropriate resources:   Identify barriers to discharge with patient and caregiver   Arrange for needed discharge resources and transportation as appropriate   Identify discharge learning needs (meds, wound care, etc)   Refer to discharge planning if patient needs post-hospital services based on physician order or complex needs related to functional status, cognitive ability or social support system     Problem: Skin/Tissue Integrity  Goal: Absence of new skin breakdown  Description: 1. Monitor for areas of redness and/or skin breakdown  2. Assess vascular access sites hourly  3. Every 4-6 hours minimum:  Change oxygen saturation probe site  4. Every 4-6 hours:  If on nasal continuous positive airway pressure, respiratory therapy assess nares and determine need for appliance change or resting period.   9/30/2023 1214 by Anna Swenson RN  Outcome: Progressing       Problem: Pain  Goal: Verbalizes/displays adequate comfort level or baseline comfort level  9/30/2023 1214 by Anna Swenson RN  Outcome: Progressing       Problem: Safety - Adult  Goal: Free from fall injury  9/30/2023 1214 by Anna Swenson RN  Outcome: Progressing

## 2023-09-30 NOTE — DISCHARGE SUMMARY
[facility]  Office: [phone]  Yahaira Hidalgo DO, Angel Grace DO, Cherri Cole DO, Jennifer Aquino MD, Rambo Mohan MD, Narda Malone MD, Easton Love MD,  Mike Boggs MD, Jing Osorio MD, Jaenssa Mckoy DO, Juwan Miles MD,  Pita Carlin MD, Dodie Royal MD, Kurt Jacobo DO, Donaldo Brown MD,  Natalie Phillips DO, Jr Hernandez MD, Bacilio Samson MD, Adan Anthony MD, Charmaine Kate MD,  Robson Varela MD, Anuja Ureña MD, Arnaldo Elias MD, Jyoti Segundo MD, Dylan Dos Santos DO, Reginald France MD,  Yaron Hamilton MD, Samia Rainey MD, Sam Avila, CNP,  Sabino Tyson, CNP,, Lucian Johnson, CNP,  Comfort Wesley, [facility], Santy Adamson, [facility], Maral Thomas, CNP, Cece Briseno, CNP, Reza Miranda, CNP, Avelina Joseph, CNP, Navi Oliver, CNP, Peggy Kolb, CNS, Theo Mullins, CNP, Fitz Crooks, [facility]    Discharge Summary     Patient ID: Maya Taylor  :  1970   MRN: [id number]     ACCOUNT:  [de-identified]   Patient's PCP: No primary care provider on file. Admit Date: 2023   Discharge Date: 2023     Length of Stay: 3  Code Status:  Full Code  Admitting Physician: Jacqui Ballesteros MD  Discharge Physician: Vita Walker DO     Active Discharge Diagnoses:     Hospital Problem Lists:  Principal Problem:    Left leg cellulitis  Active Problems:    Heavy alcohol use    Class 3 severe obesity due to excess calories without serious comorbidity in [facility])    Abnormal liver function test    Elevated blood pressure reading    Patellar bursitis of left knee    Septic prepatellar bursitis of left knee    Cirrhosis of liver ([address])  Resolved Problems:    * No resolved hospital problems.  *      Admission Condition:  stable     Discharged Condition: stable    Hospital Stay:     Hospital Course:  Maya Nails is a 77-year-old male who [phone] - F [phone]  [address], [address] [place] [id number]      Phone: [phone]   carvedilol 3.125 MG tablet  linezolid 600 MG tablet       You can get these medications from any pharmacy    Bring a paper prescription for each of these medications  aspirin 325 MG EC tablet  oxyCODONE-acetaminophen 5-325 MG per tablet         Discharge Procedure Orders   [facility] - Jocelynn Kenny DO, Gastroenterology, [facility]   Referral Priority: Routine Referral Type: Eval and Treat   Referral Reason: Specialty Services Required   Referred to Provider: Ellen Young Requested Specialty: Gastroenterology   Number of Visits Requested: 1       Time Spent on discharge is  35 mins in patient examination, evaluation, counseling as well as medication reconciliation, prescriptions for required medications, discharge plan and follow up. Electronically signed by   Yamile Lyle DO  9/30/2023  3:32 PM      Thank you Dr. Mary Jane Pond primary care provider on file. for the opportunity to be involved in this patient's care.

## 2023-09-30 NOTE — PROGRESS NOTES
Pt discharged to home with belongings via spouse. Discharge instructions given. AVS understood and signed. Pt denies having any further questions at this time  Personal items given to patient at discharge  Patient/family state they have everything they were admitted with. Patient instructed to keep dressing in place until follow up with Dr. Tang Coles. No need for dressing changes per Dr. Tang Coles.

## 2023-09-30 NOTE — PLAN OF CARE
Problem: Discharge Planning  Goal: Discharge to home or other facility with appropriate resources  9/29/2023 1739 by Rosemary Gaines RN  Outcome: Progressing  Flowsheets (Taken 9/29/2023 5880)  Discharge to home or other facility with appropriate resources:   Identify barriers to discharge with patient and caregiver   Arrange for needed discharge resources and transportation as appropriate   Identify discharge learning needs (meds, wound care, etc)   Refer to discharge planning if patient needs post-hospital services based on physician order or complex needs related to functional status, cognitive ability or social support system     Problem: Skin/Tissue Integrity  Goal: Absence of new skin breakdown  Description: 1. Monitor for areas of redness and/or skin breakdown  2. Assess vascular access sites hourly  3. Every 4-6 hours minimum:  Change oxygen saturation probe site  4. Every 4-6 hours:  If on nasal continuous positive airway pressure, respiratory therapy assess nares and determine need for appliance change or resting period.   9/30/2023 0526 by Leif Ramirez RN  Outcome: Progressing  9/29/2023 1739 by Rosemary Gaines RN  Outcome: Progressing     Problem: Pain  Goal: Verbalizes/displays adequate comfort level or baseline comfort level  9/30/2023 0526 by Leif Ramirez RN  Outcome: Progressing  Flowsheets (Taken 9/29/2023 0258 by Deanna Paniagua RN)  Verbalizes/displays adequate comfort level or baseline comfort level:   Encourage patient to monitor pain and request assistance   Consider cultural and social influences on pain and pain management   Assess pain using appropriate pain scale   Implement non-pharmacological measures as appropriate and evaluate response  9/29/2023 1739 by Rosemary Gaines RN  Outcome: Progressing     Problem: Safety - Adult  Goal: Free from fall injury  Outcome: Progressing  Flowsheets (Taken 9/30/2023 0526)  Free From Fall Injury: Instruct family/caregiver on patient

## 2023-09-30 NOTE — PROGRESS NOTES
[facility]  Office: [phone]  Yahaira Hidalgo DO, Claudean Goon Blood, DO, Jennifer Aquino MD, Rambo Mohan MD, Narda Malone MD, Easton Love MD,  Mike Boggs MD, Jing Osorio MD, Janessa Mckoy DO, Juwan Miles MD,  Pita Carlin MD, Dodie Royal MD, Kurt Jacobo DO, Donaldo Brown MD,  Jenny Grullon MD, Bacilio Samson MD, Adan Anthony MD, Charmaine Kate MD,  Robson Varela MD, Anuja Ureña MD, Arnaldo Elias MD, Jyoti Segundo MD, Dylan Dos Santos DO, Reginald France MD,  Yaron Hamilton MD, Samia Rainey MD, Sam Avila, CNP,  Sabino Tyson, CNP,, Lucian Johnson, CNP,  Comfort Wesley, [facility], Santy Adamson, [facility], Maral Thomas, CNP, Cece Briseno, CNP, Reza Miranda, CNP, Avelina Joseph, CNP, Navi Oliver, CNP, Peggy Kolb, CNS, Theo Mullins, [facility], Fitz Crooks, [facility]    Progress Note    9/30/2023    11:42 AM    Name:   Maya Taylor  MRN:     [id number]     Acct:      [de-identified]   Room:   2018/2018-02  IP Day:  3  Admit Date:  9/27/2023  5:47 PM    PCP:   No primary care provider on file. Code Status:  Full Code    Subjective:   Patient is feeling better today. No fevers, chills, nausea, vomiting, diarrhea. No chest pain or cough. His leg pain is controlled. Cultures are growing gram-positive cocci in clusters sensitivities are pending    Brief History: This is a 80-year-old male who presents to the hospital for evaluation of left leg pain and swelling. Patient was diagnosed with cellulitis and failed outpatient antibiotics with Keflex. He presented to our hospital for worsening pain and fevers. Patient was seen by orthopedic surgery and underwent left knee prepatellar irrigation and debridement with bursectomy for peripatellar cellulitis.   Patient's cultures were growing staph aureus and he was Last Modified POA    * (Principal) Left leg cellulitis 9/29/2023 Yes    Heavy alcohol use 9/28/2023 Yes    Class 3 severe obesity due to excess calories without serious comorbidity in adult [facility]) 9/28/2023 Yes    Abnormal liver function test 9/28/2023 Yes    Elevated blood pressure reading 9/28/2023 Yes    Patellar bursitis of left knee 9/28/2023 Yes    Septic prepatellar bursitis of left knee 9/29/2023 Yes    Cirrhosis of liver ([address]) 9/29/2023 Yes     Plan:     Left leg swelling likely due to cellulitis and prepatellar septic bursitis  Status post left knee prepatellar irrigation and debridement with bursectomy 9/29  Culture sent and growing Staph aureus, will wait on final sensitivities. Continue vancomycin and Rocephin  Waiting on final ID recommendations, will likely go home on zyvox when cleared by ID  Compensated cirrhosis-new diagnosis   Total bilirubin: 4.2, INR: 1.5 albumin 2.5  Ultrasound shows findings of hepatic cirrhosis with ascites and splenomegaly suggesting portal venous hypertension  Check alpha-fetoprotein. Needs outpatient screening EGD. Start low-dose Coreg. Hepatitis panel, phone antitrypsin, chondral antibodies in process  2 g sodium restriction, high-protein diet. Will need to establish care with gastroenterology outpatient  Alcohol abuse  Discussed ETOH cessation with patient, drinks ~6 beers per day.  He wants to try quitting on his own without medications  Monitor for withdrawal. symptoms  Obesity BMI 40 due to excess calories  Recommend weight loss, some modification  Elevated blood pressure reading without history of hypertension  Needs to be followed up outpatient  PT OT  DVT prophylaxis    Medical Decision Making: Medium    Discharge pending final culture data  Sheila Galo DO  9/30/2023  11:42 AM

## 2023-09-30 NOTE — DISCHARGE SUMMARY
[facility]  Office: [phone]  Kain Sol DO, Chiki Dickson DO, Bentley Cole DO, Sin Light MD, Cheri Aguilar MD, Marcos Sinclair MD, Jonathan Mi MD,  Sy Maloney MD, Hua Kahn MD, Tyrone Souza DO, Sally Bethea MD,  Wing Jenny MD, Carolyn Cowan MD, Rekha Lamar, , Naima Messer MD,  Vreonika Han DO, Geneva Morales MD, Lul Perez MD, Shawn Solorio MD, Josué Grace MD,  Jesusita [facility] MD, Perri Carvajal MD, Kellee Whitney MD, Chetna Saul MD, Freeman Bond DO, Manuel Donnelly MD,  Santo Wood MD, Jamia Mejia MD, Jatin Blank, CNP,  Jazmine Ardon, CNP,, Kirsten David, CNP,  Stephie Mehta, [facility], Tono Avery, CNP, Tayo Mike, CNP, Jw Hills, CNP, Silver Martin, CNP, Ruddy Abdi, CNP, Tamra Fontaine, CNP, Mary Kay Boss, CNS, Aylin Sanchez, CNP, April Jaramillo, [facility]    Discharge Summary     Patient ID: Ayanna Frye  :  1970   MRN: [id number]     ACCOUNT:  [de-identified]   Patient's PCP: No primary care provider on file. Admit Date: 2023   Discharge Date: 2023     Length of Stay: 3  Code Status:  Full Code  Admitting Physician: Esha Granda MD  Discharge Physician: Mario Mercado DO     Active Discharge Diagnoses:     Hospital Problem Lists:  Principal Problem:    Left leg cellulitis  Active Problems:    Heavy alcohol use    Class 3 severe obesity due to excess calories without serious comorbidity in [facility])    Abnormal liver function test    Elevated blood pressure reading    Patellar bursitis of left knee    Septic prepatellar bursitis of left knee    Cirrhosis of liver ([address])  Resolved Problems:    * No resolved hospital problems.  *      Admission Condition:  stable     Discharged Condition: stable    Hospital Stay:     Hospital Course:  Ayanna Frye is a 54-year-old male who counseling as well as medication reconciliation, prescriptions for required medications, discharge plan and follow up. Electronically signed by   Shantell Lemus DO  9/30/2023  5:24 PM      Thank you Dr. Rajani Inman primary care provider on file. for the opportunity to be involved in this patient's care.

## 2023-09-30 NOTE — PROGRESS NOTES
[facility]  Progress Note    Carola Sargent  MRN: [id number]  Date: 9/30/2023  LOS: 3     Reason for F/U :   Left knee prepatellar bursitis    Impression :   Left knee prepatellar bursitis with surrounding cellulitis involving the left lower extremity/leg  Status post left knee prepatellar irrigation and debridement with bursectomy 9/29/2023  Morbid obesity  Alcohol abuse  Suspected cholestatic liver injury secondary to alcoholic liver disease versus GABRIEL    Recommendations: The patient continues on IV  vancomycin  Staph aureus growth on cultures pending sensitivity  Discontinue ceftriaxone  Follow final cultures and adjust antibiotics as needed    Infection Control Recommendations:   Universal precautions    Discharge Planning:   Estimated Length of IV antimicrobials: To be determined  Patient will need Midline Catheter Insertion/ PICC line Insertion: No  Patient will need: Home IV , [address] [place],  [facility],  LTAC: Undetermined  Patient willneed outpatient wound care: No    Medical Decision making / Summary of Stay:   Carola Sargent is a 48y.o.-year-old male who was initially admitted on 9/27/2023. Daniela Huber is morbidly obese, has EtOH abuse and reports that about 2 weeks ago on Friday he started with some left-sided knee pain which he did not think too much of but over the weekend the knee pain got worse to the point that on Wednesday he is did not go to work stayed at home and by Friday he ended up going into an urgent care center where he was started on oral antibiotic therapy with cephalexin and he reports that he thought that initially it was getting better but by Monday his knee was bigger so he went to his primary care physician who ordered a different antibiotic in the primary care physician and also done lab testing that came back with an elevated D-dimer and due to concerns for a blood clot the patient was sent into the emergency department for further evaluation.   He does report some RARE GRAM POSITIVE COCCI IN CLUSTERS Abnormal     Culture PENDING   Culture, Anaerobic and Aerobic [[id number]] (Abnormal) Collected: 09/29/23 0816   Order Status: Completed Specimen: Swab from Joint, Knee Updated: 09/29/23 1156    Specimen Description . KNEE, JOINT PREPATELLA BUSECTOMY 3    Direct Exam MANY NEUTROPHILS Abnormal      RARE GRAM POSITIVE COCCI IN CLUSTERS Abnormal     Culture PENDING   Culture, Anaerobic and Aerobic [[id number]] (Abnormal) Collected: 09/29/23 0815   Order Status: Completed Specimen: Swab from Joint, Knee Updated: 09/29/23 1153    Specimen Description . KNEE, JOINT PREPATELLA BUSECTOMY 2    Direct Exam MANY NEUTROPHILS Abnormal      RARE GRAM POSITIVE COCCI IN CLUSTERS Abnormal     Culture PENDING       Medications:      carvedilol  3.125 mg Oral BID WC    cefTRIAXone (ROCEPHIN) IV  1,000 mg IntraVENous Q24H    sodium chloride flush  5-40 mL IntraVENous 2 times per day    enoxaparin  30 mg SubCUTAneous BID    vancomycin  1,250 mg IntraVENous Q12H    vancomycin (VANCOCIN) intermittent dosing (placeholder)   Other RX Placeholder       Electronically signed by Lesley Rollins MD on 9/30/2023 at 2:08 PM      [facility]  OFFICE: [phone]    Thank you for allowing us to participate in the care of this patient. Please call with questions. This note is created with the assistance of a speech recognition program.  While intending to generate a document that actually reflects the content of the visit, the document can still have some errors including those of syntax and sound a like substitutions which may escape proof reading. In such instances, actual meaning can be extrapolated by contextual diversion.

## 2023-10-01 LAB
MICROORGANISM SPEC CULT: ABNORMAL
MICROORGANISM SPEC CULT: NORMAL
MICROORGANISM SPEC CULT: NORMAL
MICROORGANISM/AGENT SPEC: ABNORMAL
SERVICE CMNT-IMP: NORMAL
SERVICE CMNT-IMP: NORMAL
SPECIMEN DESCRIPTION: ABNORMAL
SPECIMEN DESCRIPTION: NORMAL
SPECIMEN DESCRIPTION: NORMAL

## 2023-10-02 LAB
MICROORGANISM SPEC CULT: NORMAL
MICROORGANISM/AGENT SPEC: NORMAL
MITOCHONDRIA M2 IGG SER-ACNC: 1.5 U/ML (ref 0–4)
SERVICE CMNT-IMP: NORMAL
SERVICE CMNT-IMP: NORMAL
SPECIMEN DESCRIPTION: NORMAL
SURGICAL PATHOLOGY REPORT: NORMAL

## 2023-10-03 LAB — AFP SERPL-MCNC: 2 UG/L

## 2023-10-05 ENCOUNTER — HOSPITAL ENCOUNTER (OUTPATIENT)
Age: 53
Setting detail: SPECIMEN
Discharge: HOME | End: 2023-10-05

## 2023-10-05 LAB
AFP SERPL-MCNC: 3.1 UG/L
ALBUMIN SERPL-MCNC: 2.4 G/DL (ref 3.5–5.2)
ALBUMIN/GLOB SERPL: 0.6 {RATIO} (ref 1–2.5)
ALP SERPL-CCNC: 138 U/L (ref 40–129)
ALT SERPL-CCNC: 84 U/L (ref 5–41)
AST SERPL-CCNC: 106 U/L
BILIRUB DIRECT SERPL-MCNC: 1.4 MG/DL
BILIRUB INDIRECT SERPL-MCNC: 1.8 MG/DL (ref 0–1)
BILIRUB SERPL-MCNC: 3.2 MG/DL (ref 0.3–1.2)
CERULOPLASMIN SERPL-MCNC: 13 MG/DL (ref 15–30)
FERRITIN SERPL-MCNC: 1396 NG/ML (ref 30–400)
HAV AB SERPL IA-ACNC: NONREACTIVE
HBV SURFACE AB SERPL IA-ACNC: <3.5 MIU/ML
INR PPP: 1.8
IRON SATN MFR SERPL: ABNORMAL % (ref 20–55)
IRON SERPL-MCNC: 131 UG/DL (ref 59–158)
PROT SERPL-MCNC: 6.7 G/DL (ref 6.4–8.3)
PROTHROMBIN TIME: 20.1 SEC (ref 11.7–14.9)
TIBC SERPL-MCNC: ABNORMAL UG/DL (ref 250–450)
UNSATURATED IRON BINDING CAPACITY: <17 UG/DL (ref 112–347)

## 2023-10-07 LAB — SMOOTH MUSCLE ANTIBODY: 7 UNITS (ref 0–19)

## 2023-10-08 LAB
MICROORGANISM SPEC CULT: ABNORMAL
MICROORGANISM/AGENT SPEC: ABNORMAL
SPECIMEN DESCRIPTION: ABNORMAL
SPECIMEN DESCRIPTION: ABNORMAL

## 2023-10-09 LAB
MICROORGANISM SPEC CULT: NORMAL
MICROORGANISM/AGENT SPEC: NORMAL
SPECIMEN DESCRIPTION: NORMAL

## 2023-10-10 LAB
ANA SER QL IA: NEGATIVE
DSDNA IGG SER QL IA: 1.4 IU/ML
GLIADIN IGG SER IA-ACNC: 1.4 U/ML
MITOCHONDRIA M2 IGG SER-ACNC: 1.8 U/ML (ref 0–4)
NUCLEAR IGG SER IA-RTO: 0.4 U/ML
TTG IGA SER IA-ACNC: 4.3 U/ML

## 2023-10-16 LAB
MICROORGANISM SPEC CULT: NORMAL
MICROORGANISM/AGENT SPEC: NORMAL
SPECIMEN DESCRIPTION: NORMAL

## 2023-10-17 ENCOUNTER — OFFICE VISIT (OUTPATIENT)
Dept: INTERNAL MEDICINE CLINIC | Age: 53
End: 2023-10-17
Payer: COMMERCIAL

## 2023-10-17 ENCOUNTER — OFFICE VISIT (OUTPATIENT)
Dept: ORTHOPEDIC SURGERY | Age: 53
End: 2023-10-17

## 2023-10-17 VITALS
OXYGEN SATURATION: 97 % | WEIGHT: 300 LBS | HEART RATE: 77 BPM | RESPIRATION RATE: 16 BRPM | BODY MASS INDEX: 38.5 KG/M2 | DIASTOLIC BLOOD PRESSURE: 76 MMHG | SYSTOLIC BLOOD PRESSURE: 130 MMHG | TEMPERATURE: 97.6 F | HEIGHT: 74 IN

## 2023-10-17 VITALS — WEIGHT: 315 LBS | OXYGEN SATURATION: 100 % | BODY MASS INDEX: 40.43 KG/M2 | HEIGHT: 74 IN | RESPIRATION RATE: 16 BRPM

## 2023-10-17 DIAGNOSIS — K70.31 ALCOHOLIC CIRRHOSIS OF LIVER WITH ASCITES (HCC): ICD-10-CM

## 2023-10-17 DIAGNOSIS — R16.1 SPLENOMEGALY: ICD-10-CM

## 2023-10-17 DIAGNOSIS — F10.90 HEAVY ALCOHOL USE: ICD-10-CM

## 2023-10-17 DIAGNOSIS — M70.52 PATELLAR BURSITIS OF LEFT KNEE: ICD-10-CM

## 2023-10-17 DIAGNOSIS — E66.01 CLASS 3 SEVERE OBESITY DUE TO EXCESS CALORIES WITHOUT SERIOUS COMORBIDITY IN ADULT, UNSPECIFIED BMI (HCC): ICD-10-CM

## 2023-10-17 DIAGNOSIS — M71.162 SEPTIC PREPATELLAR BURSITIS OF LEFT KNEE: Primary | ICD-10-CM

## 2023-10-17 DIAGNOSIS — Z76.89 ENCOUNTER TO ESTABLISH CARE WITH NEW DOCTOR: Primary | ICD-10-CM

## 2023-10-17 DIAGNOSIS — I10 BENIGN ESSENTIAL HTN: ICD-10-CM

## 2023-10-17 PROBLEM — L03.116 LEFT LEG CELLULITIS: Status: RESOLVED | Noted: 2023-09-27 | Resolved: 2023-10-17

## 2023-10-17 PROBLEM — K74.60 CIRRHOSIS OF LIVER (HCC): Status: RESOLVED | Noted: 2023-09-29 | Resolved: 2023-10-17

## 2023-10-17 PROBLEM — R03.0 ELEVATED BLOOD PRESSURE READING: Status: RESOLVED | Noted: 2023-09-28 | Resolved: 2023-10-17

## 2023-10-17 PROCEDURE — 3075F SYST BP GE 130 - 139MM HG: CPT | Performed by: FAMILY MEDICINE

## 2023-10-17 PROCEDURE — 99204 OFFICE O/P NEW MOD 45 MIN: CPT | Performed by: FAMILY MEDICINE

## 2023-10-17 PROCEDURE — 99024 POSTOP FOLLOW-UP VISIT: CPT | Performed by: ORTHOPAEDIC SURGERY

## 2023-10-17 PROCEDURE — 3078F DIAST BP <80 MM HG: CPT | Performed by: FAMILY MEDICINE

## 2023-10-17 RX ORDER — FUROSEMIDE 40 MG/1
40 TABLET ORAL DAILY
COMMUNITY

## 2023-10-17 RX ORDER — SPIRONOLACTONE 100 MG/1
100 TABLET, FILM COATED ORAL DAILY
COMMUNITY

## 2023-10-17 RX ORDER — MAGNESIUM OXIDE 400 MG/1
400 TABLET ORAL DAILY
Qty: 200 TABLET | Refills: 0 | Status: SHIPPED | OUTPATIENT
Start: 2023-10-17

## 2023-10-17 SDOH — ECONOMIC STABILITY: FOOD INSECURITY: WITHIN THE PAST 12 MONTHS, THE FOOD YOU BOUGHT JUST DIDN'T LAST AND YOU DIDN'T HAVE MONEY TO GET MORE.: NEVER TRUE

## 2023-10-17 SDOH — ECONOMIC STABILITY: HOUSING INSECURITY
IN THE LAST 12 MONTHS, WAS THERE A TIME WHEN YOU DID NOT HAVE A STEADY PLACE TO SLEEP OR SLEPT IN A SHELTER (INCLUDING NOW)?: NO

## 2023-10-17 SDOH — ECONOMIC STABILITY: FOOD INSECURITY: WITHIN THE PAST 12 MONTHS, YOU WORRIED THAT YOUR FOOD WOULD RUN OUT BEFORE YOU GOT MONEY TO BUY MORE.: NEVER TRUE

## 2023-10-17 SDOH — ECONOMIC STABILITY: INCOME INSECURITY: HOW HARD IS IT FOR YOU TO PAY FOR THE VERY BASICS LIKE FOOD, HOUSING, MEDICAL CARE, AND HEATING?: NOT HARD AT ALL

## 2023-10-17 ASSESSMENT — PATIENT HEALTH QUESTIONNAIRE - PHQ9
SUM OF ALL RESPONSES TO PHQ QUESTIONS 1-9: 0
SUM OF ALL RESPONSES TO PHQ QUESTIONS 1-9: 0
SUM OF ALL RESPONSES TO PHQ9 QUESTIONS 1 & 2: 0
2. FEELING DOWN, DEPRESSED OR HOPELESS: 0
1. LITTLE INTEREST OR PLEASURE IN DOING THINGS: 0
SUM OF ALL RESPONSES TO PHQ QUESTIONS 1-9: 0
SUM OF ALL RESPONSES TO PHQ QUESTIONS 1-9: 0

## 2023-10-17 ASSESSMENT — ENCOUNTER SYMPTOMS
ALLERGIC/IMMUNOLOGIC NEGATIVE: 1
RESPIRATORY NEGATIVE: 1
GASTROINTESTINAL NEGATIVE: 1
EYES NEGATIVE: 1

## 2023-10-17 NOTE — PROGRESS NOTES
Subjective:      Patient ID: Theodoro Klinefelter is a 48 y.o. male. Hypertension  This is a chronic problem. The current episode started more than 1 month ago. The problem has been gradually improving since onset. The problem is controlled. Associated symptoms include anxiety. Risk factors for coronary artery disease include obesity and male gender. Past treatments include diuretics and beta blockers. The current treatment provides moderate improvement. Compliance problems include psychosocial issues, diet and exercise. Identifiable causes of hypertension include sleep apnea. Review of Systems   Constitutional: Negative. HENT: Negative. Eyes: Negative. Respiratory: Negative. Cardiovascular: Negative. Gastrointestinal: Negative. Endocrine: Negative. Musculoskeletal:  Positive for arthralgias. Skin: Negative. Allergic/Immunologic: Negative. Neurological: Negative. Hematological: Negative. Psychiatric/Behavioral:  Positive for behavioral problems. Past family and social history unremarkable. Diagnosis Orders   1. Encounter to establish care with new doctor        2. Heavy alcohol use        3. Benign essential HTN        4. Class 3 severe obesity due to excess calories without serious comorbidity in adult, unspecified BMI ([address])        5. Patellar bursitis of left knee        6. Alcoholic cirrhosis of liver with ascites (HCC)        7. Splenomegaly              Objective:   Physical Exam  Vitals and nursing note reviewed. Constitutional:       Appearance: He is well-developed. Comments: Creased BMI with suspected sleep apnea   HENT:      Head: Normocephalic and atraumatic. Right Ear: External ear normal.      Left Ear: External ear normal.      Nose: Nose normal.   Eyes:      Conjunctiva/sclera: Conjunctivae normal.      Pupils: Pupils are equal, round, and reactive to light. Cardiovascular:      Rate and Rhythm: Normal rate and regular rhythm.       Heart

## 2023-10-23 LAB
MICROORGANISM SPEC CULT: NORMAL
MICROORGANISM/AGENT SPEC: NORMAL
SPECIMEN DESCRIPTION: NORMAL

## 2023-10-30 LAB
MICROORGANISM SPEC CULT: NORMAL
MICROORGANISM/AGENT SPEC: NORMAL
SPECIMEN DESCRIPTION: NORMAL

## 2023-11-14 ENCOUNTER — OFFICE VISIT (OUTPATIENT)
Dept: ORTHOPEDIC SURGERY | Age: 53
End: 2023-11-14

## 2023-11-14 VITALS — RESPIRATION RATE: 15 BRPM | WEIGHT: 300 LBS | BODY MASS INDEX: 38.5 KG/M2 | HEIGHT: 74 IN

## 2023-11-14 DIAGNOSIS — M71.162 SEPTIC PREPATELLAR BURSITIS OF LEFT KNEE: Primary | ICD-10-CM

## 2023-11-14 PROCEDURE — 99024 POSTOP FOLLOW-UP VISIT: CPT | Performed by: ORTHOPAEDIC SURGERY

## 2023-11-21 ENCOUNTER — TELEPHONE (OUTPATIENT)
Dept: ORTHOPEDIC SURGERY | Age: 53
End: 2023-11-21

## 2023-11-21 NOTE — TELEPHONE ENCOUNTER
Patient called in asking for a RTW note stating he is cleared and can return on 12/4/23 full duty no restrictions. Patient was last seen on 11/14/23 with Dr. Jose Miguel Chavez. A note was created. Patient asked for it to be faxed to: [phone]. This has been completed.

## 2023-11-28 ENCOUNTER — OFFICE VISIT (OUTPATIENT)
Dept: INTERNAL MEDICINE CLINIC | Age: 53
End: 2023-11-28
Payer: COMMERCIAL

## 2023-11-28 VITALS
BODY MASS INDEX: 38.24 KG/M2 | SYSTOLIC BLOOD PRESSURE: 130 MMHG | HEART RATE: 80 BPM | HEIGHT: 74 IN | TEMPERATURE: 97.4 F | DIASTOLIC BLOOD PRESSURE: 70 MMHG | RESPIRATION RATE: 10 BRPM | OXYGEN SATURATION: 99 % | WEIGHT: 298 LBS

## 2023-11-28 DIAGNOSIS — E66.01 CLASS 3 SEVERE OBESITY DUE TO EXCESS CALORIES WITHOUT SERIOUS COMORBIDITY IN ADULT, UNSPECIFIED BMI (HCC): ICD-10-CM

## 2023-11-28 DIAGNOSIS — K70.31 ALCOHOLIC CIRRHOSIS OF LIVER WITH ASCITES (HCC): ICD-10-CM

## 2023-11-28 DIAGNOSIS — R16.1 SPLENOMEGALY: ICD-10-CM

## 2023-11-28 DIAGNOSIS — Z23 NEED FOR INFLUENZA VACCINATION: ICD-10-CM

## 2023-11-28 DIAGNOSIS — I10 BENIGN ESSENTIAL HTN: Primary | ICD-10-CM

## 2023-11-28 DIAGNOSIS — R79.89 ABNORMAL LIVER FUNCTION TEST: ICD-10-CM

## 2023-11-28 DIAGNOSIS — M70.52 PATELLAR BURSITIS OF LEFT KNEE: ICD-10-CM

## 2023-11-28 DIAGNOSIS — Z23 NEED FOR TDAP VACCINATION: ICD-10-CM

## 2023-11-28 PROBLEM — F10.90 HEAVY ALCOHOL USE: Status: RESOLVED | Noted: 2023-09-28 | Resolved: 2023-11-28

## 2023-11-28 PROCEDURE — 90471 IMMUNIZATION ADMIN: CPT | Performed by: FAMILY MEDICINE

## 2023-11-28 PROCEDURE — 90674 CCIIV4 VAC NO PRSV 0.5 ML IM: CPT | Performed by: FAMILY MEDICINE

## 2023-11-28 PROCEDURE — 90715 TDAP VACCINE 7 YRS/> IM: CPT | Performed by: FAMILY MEDICINE

## 2023-11-28 PROCEDURE — 90472 IMMUNIZATION ADMIN EACH ADD: CPT | Performed by: FAMILY MEDICINE

## 2023-11-28 PROCEDURE — 3078F DIAST BP <80 MM HG: CPT | Performed by: FAMILY MEDICINE

## 2023-11-28 PROCEDURE — 3075F SYST BP GE 130 - 139MM HG: CPT | Performed by: FAMILY MEDICINE

## 2023-11-28 PROCEDURE — 99214 OFFICE O/P EST MOD 30 MIN: CPT | Performed by: FAMILY MEDICINE

## 2023-11-28 RX ORDER — FUROSEMIDE 40 MG/1
40 TABLET ORAL DAILY
Qty: 90 TABLET | Refills: 0 | Status: SHIPPED | OUTPATIENT
Start: 2023-11-28

## 2023-11-28 RX ORDER — LANOLIN ALCOHOL/MO/W.PET/CERES
400 CREAM (GRAM) TOPICAL DAILY
COMMUNITY
Start: 2023-10-17 | End: 2023-11-28 | Stop reason: ALTCHOICE

## 2023-11-28 RX ORDER — CARVEDILOL 3.12 MG/1
3.12 TABLET ORAL 2 TIMES DAILY WITH MEALS
Qty: 180 TABLET | Refills: 0 | Status: SHIPPED | OUTPATIENT
Start: 2023-11-28

## 2023-11-28 RX ORDER — SPIRONOLACTONE 100 MG/1
100 TABLET, FILM COATED ORAL DAILY
Qty: 90 TABLET | Refills: 0 | Status: SHIPPED | OUTPATIENT
Start: 2023-11-28

## 2023-11-28 ASSESSMENT — ENCOUNTER SYMPTOMS
RESPIRATORY NEGATIVE: 1
EYES NEGATIVE: 1
ALLERGIC/IMMUNOLOGIC NEGATIVE: 1
GASTROINTESTINAL NEGATIVE: 1

## 2023-11-28 NOTE — PROGRESS NOTES
Subjective:      Patient ID: Marco Mendoza is a 48 y.o. male. Hypertension  This is a chronic problem. The current episode started more than 1 month ago. The problem has been gradually improving since onset. The problem is controlled. Risk factors for coronary artery disease include obesity and male gender. Past treatments include beta blockers and diuretics. The current treatment provides moderate improvement. Review of Systems   Constitutional: Negative. HENT: Negative. Eyes: Negative. Respiratory: Negative. Cardiovascular: Negative. Gastrointestinal: Negative. Endocrine: Negative. Musculoskeletal: Negative. Skin: Negative. Allergic/Immunologic: Negative. Neurological: Negative. Hematological: Negative. Psychiatric/Behavioral: Negative. Past family and social history unremarkable. Diagnosis Orders   1. Benign essential HTN        2. Class 3 severe obesity due to excess calories without serious comorbidity in adult, unspecified BMI ([address])        3. Abnormal liver function test        4. Patellar bursitis of left knee        5. Alcoholic cirrhosis of liver with ascites (HCC)        6. Splenomegaly              Objective:   Physical Exam  Vitals and nursing note reviewed. Constitutional:       Appearance: He is well-developed. Comments: Increased BMI   HENT:      Head: Normocephalic and atraumatic. Right Ear: External ear normal.      Left Ear: External ear normal.      Nose: Nose normal.   Eyes:      Conjunctiva/sclera: Conjunctivae normal.      Pupils: Pupils are equal, round, and reactive to light. Cardiovascular:      Rate and Rhythm: Normal rate and regular rhythm. Heart sounds: Normal heart sounds. Pulmonary:      Effort: Pulmonary effort is normal.      Breath sounds: Normal breath sounds. Abdominal:      General: Bowel sounds are normal.      Palpations: Abdomen is soft.       Comments: Alcohol LFTs and splenomegaly   Musculoskeletal:

## 2024-02-29 ENCOUNTER — OFFICE VISIT (OUTPATIENT)
Dept: INTERNAL MEDICINE CLINIC | Age: 54
End: 2024-02-29
Payer: COMMERCIAL

## 2024-02-29 VITALS
HEART RATE: 90 BPM | DIASTOLIC BLOOD PRESSURE: 60 MMHG | OXYGEN SATURATION: 92 % | RESPIRATION RATE: 10 BRPM | SYSTOLIC BLOOD PRESSURE: 118 MMHG | HEIGHT: 74 IN | WEIGHT: 306 LBS | BODY MASS INDEX: 39.27 KG/M2 | TEMPERATURE: 97.5 F

## 2024-02-29 DIAGNOSIS — R79.89 ABNORMAL LIVER FUNCTION TEST: ICD-10-CM

## 2024-02-29 DIAGNOSIS — R16.1 SPLENOMEGALY: ICD-10-CM

## 2024-02-29 DIAGNOSIS — R29.818 SUSPECTED SLEEP APNEA: ICD-10-CM

## 2024-02-29 DIAGNOSIS — Z12.11 COLON CANCER SCREENING: ICD-10-CM

## 2024-02-29 DIAGNOSIS — K70.31 ALCOHOLIC CIRRHOSIS OF LIVER WITH ASCITES (HCC): ICD-10-CM

## 2024-02-29 DIAGNOSIS — E66.01 CLASS 3 SEVERE OBESITY DUE TO EXCESS CALORIES WITHOUT SERIOUS COMORBIDITY IN ADULT, UNSPECIFIED BMI (HCC): ICD-10-CM

## 2024-02-29 DIAGNOSIS — M70.52 PATELLAR BURSITIS OF LEFT KNEE: ICD-10-CM

## 2024-02-29 DIAGNOSIS — I10 BENIGN ESSENTIAL HTN: Primary | ICD-10-CM

## 2024-02-29 PROCEDURE — 3078F DIAST BP <80 MM HG: CPT | Performed by: FAMILY MEDICINE

## 2024-02-29 PROCEDURE — 3074F SYST BP LT 130 MM HG: CPT | Performed by: FAMILY MEDICINE

## 2024-02-29 PROCEDURE — 99214 OFFICE O/P EST MOD 30 MIN: CPT | Performed by: FAMILY MEDICINE

## 2024-02-29 RX ORDER — SPIRONOLACTONE 100 MG/1
100 TABLET, FILM COATED ORAL DAILY
Qty: 90 TABLET | Refills: 1 | Status: SHIPPED | OUTPATIENT
Start: 2024-02-29

## 2024-02-29 RX ORDER — CARVEDILOL 3.12 MG/1
3.12 TABLET ORAL 2 TIMES DAILY WITH MEALS
Qty: 180 TABLET | Refills: 1 | Status: SHIPPED | OUTPATIENT
Start: 2024-02-29

## 2024-02-29 RX ORDER — FUROSEMIDE 40 MG/1
40 TABLET ORAL DAILY
Qty: 90 TABLET | Refills: 1 | Status: SHIPPED | OUTPATIENT
Start: 2024-02-29

## 2024-02-29 ASSESSMENT — ENCOUNTER SYMPTOMS
GASTROINTESTINAL NEGATIVE: 1
RESPIRATORY NEGATIVE: 1
ALLERGIC/IMMUNOLOGIC NEGATIVE: 1
EYES NEGATIVE: 1

## 2024-02-29 ASSESSMENT — PATIENT HEALTH QUESTIONNAIRE - PHQ9
SUM OF ALL RESPONSES TO PHQ9 QUESTIONS 1 & 2: 0
2. FEELING DOWN, DEPRESSED OR HOPELESS: 0
SUM OF ALL RESPONSES TO PHQ QUESTIONS 1-9: 0
1. LITTLE INTEREST OR PLEASURE IN DOING THINGS: 0
SUM OF ALL RESPONSES TO PHQ QUESTIONS 1-9: 0

## 2024-02-29 NOTE — PROGRESS NOTES
Subjective:      Patient ID: Demarco Wilson is a 53 y.o. male.    Hypertension  This is a chronic problem. The current episode started more than 1 month ago. The problem has been gradually improving since onset. The problem is controlled. Risk factors for coronary artery disease include obesity and male gender. Past treatments include beta blockers and diuretics. The current treatment provides moderate improvement. Compliance problems include exercise and diet.  Identifiable causes of hypertension include sleep apnea.       Review of Systems   Constitutional: Negative.    HENT: Negative.     Eyes: Negative.    Respiratory: Negative.     Cardiovascular: Negative.    Gastrointestinal: Negative.    Endocrine: Negative.    Musculoskeletal:  Positive for arthralgias.   Skin: Negative.    Allergic/Immunologic: Negative.    Neurological: Negative.    Hematological: Negative.    Psychiatric/Behavioral: Negative.     Past family and social history unremarkable.   Diagnosis Orders   1. Benign essential HTN        2. Colon cancer screening        3. Class 3 severe obesity due to excess calories without serious comorbidity in adult, unspecified BMI (HCC)        4. Abnormal liver function test        5. Patellar bursitis of left knee        6. Alcoholic cirrhosis of liver with ascites (HCC)        7. Splenomegaly        8. Suspected sleep apnea              Objective:   Physical Exam  Vitals and nursing note reviewed.   Constitutional:       Appearance: He is well-developed.      Comments: Morbid obesity with suspected sleep apnea   HENT:      Head: Normocephalic and atraumatic.      Right Ear: External ear normal.      Left Ear: External ear normal.      Nose: Nose normal.   Eyes:      Conjunctiva/sclera: Conjunctivae normal.      Pupils: Pupils are equal, round, and reactive to light.   Cardiovascular:      Rate and Rhythm: Normal rate and regular rhythm.      Heart sounds: Normal heart sounds.      Comments:

## 2024-04-17 ENCOUNTER — HOSPITAL ENCOUNTER (OUTPATIENT)
Age: 54
Setting detail: SPECIMEN
Discharge: HOME | End: 2024-04-17

## 2024-04-17 LAB
AFP SERPL-MCNC: 4.1 UG/L
ALBUMIN SERPL-MCNC: 2.5 G/DL (ref 3.5–5.2)
ALBUMIN/GLOB SERPL: 1 {RATIO} (ref 1–2.5)
ALP SERPL-CCNC: 119 U/L (ref 40–129)
ALT SERPL-CCNC: 50 U/L (ref 10–50)
ANION GAP SERPL CALCULATED.3IONS-SCNC: 5 MMOL/L (ref 9–16)
AST SERPL-CCNC: 67 U/L (ref 10–50)
BILIRUB SERPL-MCNC: 6.8 MG/DL (ref 0–1.2)
BUN SERPL-MCNC: 28 MG/DL (ref 6–20)
CALCIUM SERPL-MCNC: 8.9 MG/DL (ref 8.6–10.4)
CHLORIDE SERPL-SCNC: 102 MMOL/L (ref 98–107)
CO2 SERPL-SCNC: 26 MMOL/L (ref 20–31)
CREAT SERPL-MCNC: 1.5 MG/DL (ref 0.7–1.2)
ERYTHROCYTE [DISTWIDTH] IN BLOOD BY AUTOMATED COUNT: 14.4 % (ref 11.8–14.4)
GFR SERPL CREATININE-BSD FRML MDRD: 58 ML/MIN/1.73M2
GLUCOSE SERPL-MCNC: 81 MG/DL (ref 74–99)
HCT VFR BLD AUTO: 36.7 % (ref 40.7–50.3)
HGB BLD-MCNC: 12.1 G/DL (ref 13–17)
INR PPP: 3.9
MAGNESIUM SERPL-MCNC: 1.9 MG/DL (ref 1.6–2.6)
MCH RBC QN AUTO: 36.4 PG (ref 25.2–33.5)
MCHC RBC AUTO-ENTMCNC: 33 G/DL (ref 28.4–34.8)
MCV RBC AUTO: 110.5 FL (ref 82.6–102.9)
NRBC BLD-RTO: 0 PER 100 WBC
PLATELET # BLD AUTO: ABNORMAL K/UL (ref 138–453)
PLATELET, FLUORESCENCE: 103 K/UL (ref 138–453)
PLATELETS.RETICULATED NFR BLD AUTO: 3.4 % (ref 1.1–10.3)
POTASSIUM SERPL-SCNC: 5.7 MMOL/L (ref 3.7–5.3)
PROT SERPL-MCNC: 6 G/DL (ref 6.6–8.7)
PROTHROMBIN TIME: 36.7 SEC (ref 11.7–14.9)
RBC # BLD AUTO: 3.32 M/UL (ref 4.21–5.77)
SODIUM SERPL-SCNC: 133 MMOL/L (ref 136–145)
WBC OTHER # BLD: 9.9 K/UL (ref 3.5–11.3)

## 2024-04-22 ENCOUNTER — APPOINTMENT (OUTPATIENT)
Dept: GENERAL RADIOLOGY | Age: 54
DRG: 871 | End: 2024-04-22
Payer: COMMERCIAL

## 2024-04-22 ENCOUNTER — APPOINTMENT (OUTPATIENT)
Dept: VASCULAR LAB | Age: 54
DRG: 871 | End: 2024-04-22
Attending: EMERGENCY MEDICINE
Payer: COMMERCIAL

## 2024-04-22 ENCOUNTER — APPOINTMENT (OUTPATIENT)
Dept: INTERVENTIONAL RADIOLOGY/VASCULAR | Age: 54
DRG: 871 | End: 2024-04-22
Payer: COMMERCIAL

## 2024-04-22 ENCOUNTER — HOSPITAL ENCOUNTER (INPATIENT)
Age: 54
LOS: 14 days | Discharge: ANOTHER HOSPITAL | DRG: 871 | End: 2024-05-06
Attending: EMERGENCY MEDICINE | Admitting: FAMILY MEDICINE
Payer: COMMERCIAL

## 2024-04-22 ENCOUNTER — APPOINTMENT (OUTPATIENT)
Dept: CT IMAGING | Age: 54
DRG: 871 | End: 2024-04-22
Payer: COMMERCIAL

## 2024-04-22 DIAGNOSIS — J18.9 PNEUMONIA OF RIGHT LOWER LOBE DUE TO INFECTIOUS ORGANISM: Primary | ICD-10-CM

## 2024-04-22 DIAGNOSIS — N17.9 AKI (ACUTE KIDNEY INJURY) (HCC): ICD-10-CM

## 2024-04-22 DIAGNOSIS — N35.919 STRICTURE OF MALE URETHRA, UNSPECIFIED STRICTURE TYPE: ICD-10-CM

## 2024-04-22 DIAGNOSIS — L03.115 CELLULITIS OF RIGHT LOWER EXTREMITY: ICD-10-CM

## 2024-04-22 DIAGNOSIS — E87.5 HYPERKALEMIA: ICD-10-CM

## 2024-04-22 LAB
ALBUMIN SERPL-MCNC: 2.4 G/DL (ref 3.5–5.2)
ALP SERPL-CCNC: 107 U/L (ref 40–129)
ALT SERPL-CCNC: 46 U/L (ref 5–41)
ANION GAP SERPL CALCULATED.3IONS-SCNC: 16 MMOL/L (ref 9–17)
ANION GAP SERPL CALCULATED.3IONS-SCNC: 17 MMOL/L (ref 9–17)
AST SERPL-CCNC: 59 U/L
BASOPHILS # BLD: 0 K/UL (ref 0–0.2)
BASOPHILS NFR BLD: 0 % (ref 0–2)
BILIRUB SERPL-MCNC: 9.3 MG/DL (ref 0.3–1.2)
BUN SERPL-MCNC: 34 MG/DL (ref 6–20)
BUN SERPL-MCNC: 36 MG/DL (ref 6–20)
BUN/CREAT SERPL: 12 (ref 9–20)
BUN/CREAT SERPL: 13 (ref 9–20)
CALCIUM SERPL-MCNC: 8.6 MG/DL (ref 8.6–10.4)
CALCIUM SERPL-MCNC: 8.8 MG/DL (ref 8.6–10.4)
CHLORIDE SERPL-SCNC: 100 MMOL/L (ref 98–107)
CHLORIDE SERPL-SCNC: 99 MMOL/L (ref 98–107)
CO2 SERPL-SCNC: 14 MMOL/L (ref 20–31)
CO2 SERPL-SCNC: 18 MMOL/L (ref 20–31)
CREAT SERPL-MCNC: 2.7 MG/DL (ref 0.7–1.2)
CREAT SERPL-MCNC: 3 MG/DL (ref 0.7–1.2)
EKG ATRIAL RATE: 103 BPM
EKG P AXIS: 32 DEGREES
EKG P-R INTERVAL: 134 MS
EKG Q-T INTERVAL: 346 MS
EKG QRS DURATION: 82 MS
EKG QTC CALCULATION (BAZETT): 453 MS
EKG R AXIS: 17 DEGREES
EKG T AXIS: 5 DEGREES
EKG VENTRICULAR RATE: 103 BPM
EOSINOPHIL # BLD: 0 K/UL (ref 0–0.44)
EOSINOPHILS RELATIVE PERCENT: 0 % (ref 1–4)
ERYTHROCYTE [DISTWIDTH] IN BLOOD BY AUTOMATED COUNT: 14.4 % (ref 11.8–14.4)
GFR SERPL CREATININE-BSD FRML MDRD: 24 ML/MIN/1.73M2
GFR SERPL CREATININE-BSD FRML MDRD: 27 ML/MIN/1.73M2
GLUCOSE BLD-MCNC: 31 MG/DL (ref 75–110)
GLUCOSE BLD-MCNC: 32 MG/DL (ref 75–110)
GLUCOSE BLD-MCNC: 64 MG/DL (ref 75–110)
GLUCOSE BLD-MCNC: 78 MG/DL (ref 75–110)
GLUCOSE BLD-MCNC: 86 MG/DL (ref 75–110)
GLUCOSE FLD-MCNC: 73 MG/DL
GLUCOSE SERPL-MCNC: 40 MG/DL (ref 70–99)
GLUCOSE SERPL-MCNC: 78 MG/DL (ref 70–99)
HCO3 VENOUS: 17.2 MMOL/L (ref 22–29)
HCO3 VENOUS: 17.9 MMOL/L (ref 22–29)
HCT VFR BLD AUTO: 37.6 % (ref 40.7–50.3)
HGB BLD-MCNC: 12 G/DL (ref 13–17)
IMM GRANULOCYTES # BLD AUTO: 0 K/UL (ref 0–0.3)
IMM GRANULOCYTES NFR BLD: 0 %
INR PPP: 2.7
LACTATE BLDV-SCNC: 7.8 MMOL/L (ref 0.5–2.2)
LACTATE BLDV-SCNC: 9.1 MMOL/L (ref 0.5–1.9)
LDH FLD L TO P-CCNC: 89 U/L
LYMPHOCYTES NFR BLD: 0.65 K/UL (ref 1.1–3.7)
LYMPHOCYTES RELATIVE PERCENT: 3 % (ref 24–43)
MCH RBC QN AUTO: 36.7 PG (ref 25.2–33.5)
MCHC RBC AUTO-ENTMCNC: 31.9 G/DL (ref 28–38)
MCV RBC AUTO: 115 FL (ref 82.6–102.9)
MONOCYTES NFR BLD: 1.3 K/UL (ref 0.1–1.2)
MONOCYTES NFR BLD: 6 % (ref 3–12)
MORPHOLOGY: ABNORMAL
NEGATIVE BASE EXCESS, VEN: 10.8 MMOL/L (ref 0–2)
NEGATIVE BASE EXCESS, VEN: 9.5 MMOL/L (ref 0–2)
NEUTROPHILS NFR BLD: 91 % (ref 36–65)
NEUTS SEG NFR BLD: 19.75 K/UL (ref 1.5–8.1)
O2 SAT, VEN: 65.5 % (ref 60–85)
O2 SAT, VEN: 83.9 % (ref 60–85)
PCO2, VEN: 39.1 MM HG (ref 41–51)
PCO2, VEN: 49.4 MM HG (ref 41–51)
PH VENOUS: 7.17 (ref 7.32–7.43)
PH VENOUS: 7.25 (ref 7.32–7.43)
PLATELET # BLD AUTO: 105 K/UL (ref 138–453)
PMV BLD AUTO: 10.3 FL (ref 8.1–13.5)
PO2, VEN: 43.1 MM HG (ref 30–50)
PO2, VEN: 56.1 MM HG (ref 30–50)
POTASSIUM SERPL-SCNC: 5.1 MMOL/L (ref 3.7–5.3)
POTASSIUM SERPL-SCNC: 5.8 MMOL/L (ref 3.7–5.3)
PROT FLD-MCNC: 1.5 G/DL
PROT SERPL-MCNC: 6 G/DL (ref 6.4–8.3)
PROTHROMBIN TIME: 28.6 SEC (ref 11.5–14.2)
RBC # BLD AUTO: 3.27 M/UL (ref 4.21–5.77)
SODIUM SERPL-SCNC: 131 MMOL/L (ref 135–144)
SODIUM SERPL-SCNC: 133 MMOL/L (ref 135–144)
SPECIMEN TYPE: NORMAL
TROPONIN I SERPL HS-MCNC: 47 NG/L (ref 0–22)
TROPONIN I SERPL HS-MCNC: 51 NG/L (ref 0–22)
WBC OTHER # BLD: 21.7 K/UL (ref 3.5–11.3)

## 2024-04-22 PROCEDURE — 2709999900 IR GUIDED THORACENTESIS PLEURAL

## 2024-04-22 PROCEDURE — 2500000003 HC RX 250 WO HCPCS: Performed by: INTERNAL MEDICINE

## 2024-04-22 PROCEDURE — 87040 BLOOD CULTURE FOR BACTERIA: CPT

## 2024-04-22 PROCEDURE — 2709999900 IR FLUORO GUIDED CVA DEVICE PLMT/REPLACE/REMOVAL

## 2024-04-22 PROCEDURE — 71250 CT THORAX DX C-: CPT

## 2024-04-22 PROCEDURE — 87205 SMEAR GRAM STAIN: CPT

## 2024-04-22 PROCEDURE — 77001 FLUOROGUIDE FOR VEIN DEVICE: CPT

## 2024-04-22 PROCEDURE — 73590 X-RAY EXAM OF LOWER LEG: CPT

## 2024-04-22 PROCEDURE — 99255 IP/OBS CONSLTJ NEW/EST HI 80: CPT | Performed by: FAMILY MEDICINE

## 2024-04-22 PROCEDURE — 82947 ASSAY GLUCOSE BLOOD QUANT: CPT

## 2024-04-22 PROCEDURE — 32555 ASPIRATE PLEURA W/ IMAGING: CPT

## 2024-04-22 PROCEDURE — 96365 THER/PROPH/DIAG IV INF INIT: CPT

## 2024-04-22 PROCEDURE — 83605 ASSAY OF LACTIC ACID: CPT

## 2024-04-22 PROCEDURE — 36556 INSERT NON-TUNNEL CV CATH: CPT

## 2024-04-22 PROCEDURE — 51798 US URINE CAPACITY MEASURE: CPT

## 2024-04-22 PROCEDURE — 2580000003 HC RX 258: Performed by: EMERGENCY MEDICINE

## 2024-04-22 PROCEDURE — 87116 MYCOBACTERIA CULTURE: CPT

## 2024-04-22 PROCEDURE — 93005 ELECTROCARDIOGRAM TRACING: CPT | Performed by: FAMILY MEDICINE

## 2024-04-22 PROCEDURE — 87070 CULTURE OTHR SPECIMN AEROBIC: CPT

## 2024-04-22 PROCEDURE — 93010 ELECTROCARDIOGRAM REPORT: CPT | Performed by: INTERNAL MEDICINE

## 2024-04-22 PROCEDURE — 88305 TISSUE EXAM BY PATHOLOGIST: CPT

## 2024-04-22 PROCEDURE — 96375 TX/PRO/DX INJ NEW DRUG ADDON: CPT

## 2024-04-22 PROCEDURE — 80053 COMPREHEN METABOLIC PANEL: CPT

## 2024-04-22 PROCEDURE — 2580000003 HC RX 258: Performed by: FAMILY MEDICINE

## 2024-04-22 PROCEDURE — 2500000003 HC RX 250 WO HCPCS

## 2024-04-22 PROCEDURE — 93971 EXTREMITY STUDY: CPT | Performed by: SURGERY

## 2024-04-22 PROCEDURE — 2000000000 HC ICU R&B

## 2024-04-22 PROCEDURE — 6360000002 HC RX W HCPCS: Performed by: NURSE PRACTITIONER

## 2024-04-22 PROCEDURE — 94761 N-INVAS EAR/PLS OXIMETRY MLT: CPT

## 2024-04-22 PROCEDURE — 2500000003 HC RX 250 WO HCPCS: Performed by: EMERGENCY MEDICINE

## 2024-04-22 PROCEDURE — 71045 X-RAY EXAM CHEST 1 VIEW: CPT

## 2024-04-22 PROCEDURE — 96361 HYDRATE IV INFUSION ADD-ON: CPT

## 2024-04-22 PROCEDURE — 6360000002 HC RX W HCPCS: Performed by: EMERGENCY MEDICINE

## 2024-04-22 PROCEDURE — 0W993ZZ DRAINAGE OF RIGHT PLEURAL CAVITY, PERCUTANEOUS APPROACH: ICD-10-PCS | Performed by: FAMILY MEDICINE

## 2024-04-22 PROCEDURE — 2580000003 HC RX 258: Performed by: INTERNAL MEDICINE

## 2024-04-22 PROCEDURE — 02HV33Z INSERTION OF INFUSION DEVICE INTO SUPERIOR VENA CAVA, PERCUTANEOUS APPROACH: ICD-10-PCS | Performed by: FAMILY MEDICINE

## 2024-04-22 PROCEDURE — 93005 ELECTROCARDIOGRAM TRACING: CPT | Performed by: EMERGENCY MEDICINE

## 2024-04-22 PROCEDURE — 2500000003 HC RX 250 WO HCPCS: Performed by: NURSE PRACTITIONER

## 2024-04-22 PROCEDURE — 87077 CULTURE AEROBIC IDENTIFY: CPT

## 2024-04-22 PROCEDURE — 84484 ASSAY OF TROPONIN QUANT: CPT

## 2024-04-22 PROCEDURE — 82803 BLOOD GASES ANY COMBINATION: CPT

## 2024-04-22 PROCEDURE — 87641 MR-STAPH DNA AMP PROBE: CPT

## 2024-04-22 PROCEDURE — 2580000003 HC RX 258: Performed by: NURSE PRACTITIONER

## 2024-04-22 PROCEDURE — 6360000002 HC RX W HCPCS: Performed by: INTERNAL MEDICINE

## 2024-04-22 PROCEDURE — 83986 ASSAY PH BODY FLUID NOS: CPT

## 2024-04-22 PROCEDURE — 89051 BODY FLUID CELL COUNT: CPT

## 2024-04-22 PROCEDURE — 96368 THER/DIAG CONCURRENT INF: CPT

## 2024-04-22 PROCEDURE — 80048 BASIC METABOLIC PNL TOTAL CA: CPT

## 2024-04-22 PROCEDURE — 87154 CUL TYP ID BLD PTHGN 6+ TRGT: CPT

## 2024-04-22 PROCEDURE — 87206 SMEAR FLUORESCENT/ACID STAI: CPT

## 2024-04-22 PROCEDURE — 87186 SC STD MICRODIL/AGAR DIL: CPT

## 2024-04-22 PROCEDURE — 36415 COLL VENOUS BLD VENIPUNCTURE: CPT

## 2024-04-22 PROCEDURE — 83615 LACTATE (LD) (LDH) ENZYME: CPT

## 2024-04-22 PROCEDURE — 2700000000 HC OXYGEN THERAPY PER DAY

## 2024-04-22 PROCEDURE — 84157 ASSAY OF PROTEIN OTHER: CPT

## 2024-04-22 PROCEDURE — 88112 CYTOPATH CELL ENHANCE TECH: CPT

## 2024-04-22 PROCEDURE — 87075 CULTR BACTERIA EXCEPT BLOOD: CPT

## 2024-04-22 PROCEDURE — 6360000002 HC RX W HCPCS: Performed by: FAMILY MEDICINE

## 2024-04-22 PROCEDURE — 93971 EXTREMITY STUDY: CPT

## 2024-04-22 PROCEDURE — 6370000000 HC RX 637 (ALT 250 FOR IP): Performed by: EMERGENCY MEDICINE

## 2024-04-22 PROCEDURE — 76937 US GUIDE VASCULAR ACCESS: CPT

## 2024-04-22 PROCEDURE — 82945 GLUCOSE OTHER FLUID: CPT

## 2024-04-22 PROCEDURE — 99285 EMERGENCY DEPT VISIT HI MDM: CPT

## 2024-04-22 PROCEDURE — 73630 X-RAY EXAM OF FOOT: CPT

## 2024-04-22 PROCEDURE — 85610 PROTHROMBIN TIME: CPT

## 2024-04-22 PROCEDURE — 87015 SPECIMEN INFECT AGNT CONCNTJ: CPT

## 2024-04-22 PROCEDURE — 85025 COMPLETE CBC W/AUTO DIFF WBC: CPT

## 2024-04-22 RX ORDER — ONDANSETRON 2 MG/ML
4 INJECTION INTRAMUSCULAR; INTRAVENOUS EVERY 6 HOURS PRN
Status: DISCONTINUED | OUTPATIENT
Start: 2024-04-22 | End: 2024-05-06 | Stop reason: HOSPADM

## 2024-04-22 RX ORDER — ACETAMINOPHEN 325 MG/1
650 TABLET ORAL EVERY 4 HOURS PRN
Status: DISCONTINUED | OUTPATIENT
Start: 2024-04-22 | End: 2024-05-06 | Stop reason: HOSPADM

## 2024-04-22 RX ORDER — MORPHINE SULFATE 2 MG/ML
2 INJECTION, SOLUTION INTRAMUSCULAR; INTRAVENOUS
Status: DISCONTINUED | OUTPATIENT
Start: 2024-04-22 | End: 2024-05-02

## 2024-04-22 RX ORDER — 0.9 % SODIUM CHLORIDE 0.9 %
1000 INTRAVENOUS SOLUTION INTRAVENOUS ONCE
Status: COMPLETED | OUTPATIENT
Start: 2024-04-22 | End: 2024-04-22

## 2024-04-22 RX ORDER — ENOXAPARIN SODIUM 100 MG/ML
30 INJECTION SUBCUTANEOUS DAILY
Status: DISCONTINUED | OUTPATIENT
Start: 2024-04-22 | End: 2024-04-22

## 2024-04-22 RX ORDER — SODIUM CHLORIDE 0.9 % (FLUSH) 0.9 %
5-40 SYRINGE (ML) INJECTION EVERY 12 HOURS SCHEDULED
Status: DISCONTINUED | OUTPATIENT
Start: 2024-04-22 | End: 2024-05-06 | Stop reason: HOSPADM

## 2024-04-22 RX ORDER — CALCIUM GLUCONATE 10 MG/ML
1000 INJECTION, SOLUTION INTRAVENOUS ONCE
Status: COMPLETED | OUTPATIENT
Start: 2024-04-22 | End: 2024-04-22

## 2024-04-22 RX ORDER — MORPHINE SULFATE 4 MG/ML
4 INJECTION, SOLUTION INTRAMUSCULAR; INTRAVENOUS ONCE
Status: COMPLETED | OUTPATIENT
Start: 2024-04-22 | End: 2024-04-22

## 2024-04-22 RX ORDER — ENOXAPARIN SODIUM 100 MG/ML
30 INJECTION SUBCUTANEOUS 2 TIMES DAILY
Status: DISCONTINUED | OUTPATIENT
Start: 2024-04-22 | End: 2024-04-23

## 2024-04-22 RX ORDER — ONDANSETRON 2 MG/ML
4 INJECTION INTRAMUSCULAR; INTRAVENOUS ONCE
Status: COMPLETED | OUTPATIENT
Start: 2024-04-22 | End: 2024-04-22

## 2024-04-22 RX ORDER — NOREPINEPHRINE BITARTRATE 0.06 MG/ML
1-100 INJECTION, SOLUTION INTRAVENOUS CONTINUOUS
Status: DISCONTINUED | OUTPATIENT
Start: 2024-04-22 | End: 2024-04-24 | Stop reason: ALTCHOICE

## 2024-04-22 RX ORDER — SODIUM CHLORIDE, SODIUM LACTATE, POTASSIUM CHLORIDE, AND CALCIUM CHLORIDE .6; .31; .03; .02 G/100ML; G/100ML; G/100ML; G/100ML
2000 INJECTION, SOLUTION INTRAVENOUS ONCE
Status: COMPLETED | OUTPATIENT
Start: 2024-04-22 | End: 2024-04-23

## 2024-04-22 RX ORDER — DEXTROSE MONOHYDRATE 25 G/50ML
25 INJECTION, SOLUTION INTRAVENOUS ONCE
Status: COMPLETED | OUTPATIENT
Start: 2024-04-22 | End: 2024-04-22

## 2024-04-22 RX ORDER — ONDANSETRON 4 MG/1
4 TABLET, ORALLY DISINTEGRATING ORAL EVERY 8 HOURS PRN
Status: DISCONTINUED | OUTPATIENT
Start: 2024-04-22 | End: 2024-05-06 | Stop reason: HOSPADM

## 2024-04-22 RX ORDER — SODIUM CHLORIDE 9 MG/ML
INJECTION, SOLUTION INTRAVENOUS PRN
Status: DISCONTINUED | OUTPATIENT
Start: 2024-04-22 | End: 2024-05-06 | Stop reason: HOSPADM

## 2024-04-22 RX ORDER — DEXTROSE MONOHYDRATE 100 MG/ML
INJECTION, SOLUTION INTRAVENOUS CONTINUOUS PRN
Status: DISCONTINUED | OUTPATIENT
Start: 2024-04-22 | End: 2024-05-06 | Stop reason: HOSPADM

## 2024-04-22 RX ORDER — SODIUM CHLORIDE 0.9 % (FLUSH) 0.9 %
5-40 SYRINGE (ML) INJECTION PRN
Status: DISCONTINUED | OUTPATIENT
Start: 2024-04-22 | End: 2024-05-06 | Stop reason: HOSPADM

## 2024-04-22 RX ORDER — LACTULOSE 10 G/15ML
20 SOLUTION ORAL 3 TIMES DAILY
Status: ON HOLD | COMMUNITY
End: 2024-05-03 | Stop reason: HOSPADM

## 2024-04-22 RX ADMIN — PIPERACILLIN AND TAZOBACTAM 3375 MG: 3; .375 INJECTION, POWDER, LYOPHILIZED, FOR SOLUTION INTRAVENOUS at 15:43

## 2024-04-22 RX ADMIN — DEXTROSE MONOHYDRATE 25 G: 25 INJECTION, SOLUTION INTRAVENOUS at 09:42

## 2024-04-22 RX ADMIN — SODIUM ZIRCONIUM CYCLOSILICATE 10 G: 10 POWDER, FOR SUSPENSION ORAL at 09:42

## 2024-04-22 RX ADMIN — DEXTROSE MONOHYDRATE 250 ML: 100 INJECTION, SOLUTION INTRAVENOUS at 14:41

## 2024-04-22 RX ADMIN — INSULIN HUMAN 5 UNITS: 100 INJECTION, SOLUTION PARENTERAL at 09:42

## 2024-04-22 RX ADMIN — ENOXAPARIN SODIUM 30 MG: 100 INJECTION SUBCUTANEOUS at 15:39

## 2024-04-22 RX ADMIN — PHENYLEPHRINE HYDROCHLORIDE 30 MCG/MIN: 50 INJECTION INTRAVENOUS at 15:09

## 2024-04-22 RX ADMIN — ONDANSETRON 4 MG: 2 INJECTION INTRAMUSCULAR; INTRAVENOUS at 09:03

## 2024-04-22 RX ADMIN — PIPERACILLIN AND TAZOBACTAM 4500 MG: 4; .5 INJECTION, POWDER, LYOPHILIZED, FOR SOLUTION INTRAVENOUS at 09:41

## 2024-04-22 RX ADMIN — ENOXAPARIN SODIUM 30 MG: 100 INJECTION SUBCUTANEOUS at 20:51

## 2024-04-22 RX ADMIN — PHENYLEPHRINE HYDROCHLORIDE 210 MCG/MIN: 50 INJECTION INTRAVENOUS at 20:54

## 2024-04-22 RX ADMIN — SODIUM CHLORIDE, POTASSIUM CHLORIDE, SODIUM LACTATE AND CALCIUM CHLORIDE 2000 ML: 600; 310; 30; 20 INJECTION, SOLUTION INTRAVENOUS at 14:35

## 2024-04-22 RX ADMIN — MORPHINE SULFATE 4 MG: 4 INJECTION, SOLUTION INTRAMUSCULAR; INTRAVENOUS at 10:20

## 2024-04-22 RX ADMIN — SODIUM CHLORIDE 1000 ML: 9 INJECTION, SOLUTION INTRAVENOUS at 09:22

## 2024-04-22 RX ADMIN — MORPHINE SULFATE 2 MG: 2 INJECTION, SOLUTION INTRAMUSCULAR; INTRAVENOUS at 18:56

## 2024-04-22 RX ADMIN — SODIUM CHLORIDE: 9 INJECTION, SOLUTION INTRAVENOUS at 20:22

## 2024-04-22 RX ADMIN — SODIUM BICARBONATE 50 MEQ: 84 INJECTION INTRAVENOUS at 17:31

## 2024-04-22 RX ADMIN — FOLIC ACID: 5 INJECTION, SOLUTION INTRAMUSCULAR; INTRAVENOUS; SUBCUTANEOUS at 17:59

## 2024-04-22 RX ADMIN — SODIUM CHLORIDE, PRESERVATIVE FREE 10 ML: 5 INJECTION INTRAVENOUS at 19:31

## 2024-04-22 RX ADMIN — Medication 5 MCG/MIN: at 11:19

## 2024-04-22 RX ADMIN — Medication 50 MEQ: at 17:27

## 2024-04-22 RX ADMIN — MORPHINE SULFATE 2 MG: 2 INJECTION, SOLUTION INTRAMUSCULAR; INTRAVENOUS at 15:36

## 2024-04-22 RX ADMIN — Medication 53 MCG/MIN: at 18:49

## 2024-04-22 RX ADMIN — SODIUM BICARBONATE 50 MEQ: 84 INJECTION INTRAVENOUS at 17:27

## 2024-04-22 RX ADMIN — SODIUM CHLORIDE 1000 ML: 9 INJECTION, SOLUTION INTRAVENOUS at 07:28

## 2024-04-22 RX ADMIN — Medication 2500 MG: at 09:57

## 2024-04-22 RX ADMIN — SODIUM BICARBONATE: 84 INJECTION, SOLUTION INTRAVENOUS at 18:45

## 2024-04-22 RX ADMIN — SODIUM CHLORIDE: 9 INJECTION, SOLUTION INTRAVENOUS at 15:41

## 2024-04-22 RX ADMIN — Medication 10 MCG/MIN: at 11:33

## 2024-04-22 RX ADMIN — CALCIUM GLUCONATE 1000 MG: 10 INJECTION, SOLUTION INTRAVENOUS at 09:57

## 2024-04-22 ASSESSMENT — PAIN DESCRIPTION - DESCRIPTORS
DESCRIPTORS: POUNDING;TIGHTNESS
DESCRIPTORS: THROBBING;TIGHTNESS
DESCRIPTORS: ACHING

## 2024-04-22 ASSESSMENT — PAIN - FUNCTIONAL ASSESSMENT
PAIN_FUNCTIONAL_ASSESSMENT: 0-10
PAIN_FUNCTIONAL_ASSESSMENT: PREVENTS OR INTERFERES WITH ALL ACTIVE AND SOME PASSIVE ACTIVITIES

## 2024-04-22 ASSESSMENT — PAIN DESCRIPTION - ORIENTATION
ORIENTATION: LOWER;RIGHT
ORIENTATION: RIGHT
ORIENTATION: RIGHT;LOWER

## 2024-04-22 ASSESSMENT — PAIN SCALES - GENERAL
PAINLEVEL_OUTOF10: 5
PAINLEVEL_OUTOF10: 4
PAINLEVEL_OUTOF10: 8
PAINLEVEL_OUTOF10: 10
PAINLEVEL_OUTOF10: 7
PAINLEVEL_OUTOF10: 7
PAINLEVEL_OUTOF10: 9

## 2024-04-22 ASSESSMENT — PAIN DESCRIPTION - LOCATION
LOCATION: LEG

## 2024-04-22 NOTE — FLOWSHEET NOTE
Pt tolerated IR procedures without discomfort 950 ml of dark hari pleural fluid removed specimen collected for labs dressing applied to procedure site per right upper back post procedure cxr taken. Triple lumen catheter placed into right jugular vein by IR physician pt remained alert and oriented despite hypotension Levophed titrated according to orders pt transported to ICU

## 2024-04-22 NOTE — H&P
Pain redness and swelling right leg-posttraumatic  Shortness of breath with chest pain  Hypertension  Hypoglycemia  History of present illness  53-year-old overweight  male who recently had a log fell on his right leg causing significant hematoma with inflammatory signs.  Lower extremity venous scan was negative for DVT.  He is being provided with pain control  He is also complaining of shortness of breath.  On presentation the emergency room he was seen to have right large pleural effusion thoracentesis done.  Because he was significantly hypotensive possibly secondary to traumatic hematoma of the leg, he has been placed on Levophed.  He is also seen to have significant hypoglycemia with random blood sugars in the 30s  Past medical history  Alcoholic cirrhosis with ascites  Chronic malnutrition/hypoproteimia, NSR  Obesity with suspected sleep  Past surgical history  Surgery for septic left prepatellar bursitis  Need for frequent paracentesis medication per list reviewed  Allergies per list reviewed social history no ongoing history of tobacco, excessive alcohol or illicit drug use  Family history  Review of system all 12 systems reviewed part  History of present illness  Vitals  Hypotensive on Levophed and IV  Systemic exam  HEENT unremarkable  Neck unremarkable  Lungs bilateral CTA  CVS S1-S2 regular rate and rhythm  Abdomen distended with underlying ascites.  No skin wall change.  No tenderness.  Normoactive bowel sound  CNS no acute focal sensorimotor deficit  Lower extremity known history of bilateral lower extremity peripheral venous insufficiency.  Pertinent examination of the right leg shows posttraumatic swelling/hematoma.  Possibility of cellulitis cannot be ruled out.  Dorsalis pedis and posterior tibial 1+   Latest Reference Range & Units Most Recent   Sodium 135 - 144 mmol/L 131 (L)  4/22/24 13:41   Potassium 3.7 - 5.3 mmol/L 5.1  4/22/24 13:41   Chloride 98 - 107 mmol/L 100  4/22/24 13:41

## 2024-04-22 NOTE — ED PROVIDER NOTES
EMERGENCY DEPARTMENT ENCOUNTER    Pt Name: Demarco Wilson  MRN: [id number]  Birthdate 1970  Date of evaluation: 4/22/24  CHIEF COMPLAINT       Chief Complaint   Patient presents with    Cough    Fatigue    Diarrhea    Emesis     HISTORY OF PRESENT ILLNESS   The history is provided by the patient and medical records.  The patient is a 53-year-old male who presents to the ED for right foot pain.  Patient reports dropping a log on his right foot 8 days ago.  Since that time he has had increased pain, swelling and redness to right foot and leg.  Also reports chronic cough since last August.  Coughing spells are so strong that a cause him to vomit.  Wife notes hospitalization last September for left leg cellulitis.    REVIEW OF SYSTEMS     Review of Systems  All other systems reviewed and are negative.    PASTMEDICAL HISTORY     Past Medical History:   Diagnosis Date    Medial meniscus tear     MRSA bacteremia     left knee    Pneumonia      Past Problem List  Patient Active Problem List   Diagnosis Code    Class 3 severe obesity due to excess calories without serious comorbidity in adult ([facility]) E66.01    Abnormal liver function test R79.89    Patellar bursitis of left knee M70.52    Benign essential HTN I10    Alcoholic cirrhosis of liver with ascites ([facility]) K70.31    Splenomegaly R16.1    Suspected sleep apnea R29.818    Pneumonia due to infectious organism J18.9    Cellulitis of right lower extremity L03.115    Bacteremia R78.81    Sepsis associated hypotension ([facility]) A41.9, I95.9     SURGICAL HISTORY       Past Surgical History:   Procedure Laterality Date    CYSTOSCOPY N/A 4/23/2024    CYSTOSCOPY URETHRAL DILATATION WITH LAWLER PLACEMENT performed by Eliud Cunningham MD at [facility] OR    KNEE SURGERY Right     KNEE SURGERY Left 9/29/2023    1.Left knee prepatellar irrigation and debridement with bursectomy performed by Wisam Villeda MD at [facility] OR    WISDOM TOOTH EXTRACTION       CURRENT MEDICATIONS       Current

## 2024-04-23 ENCOUNTER — APPOINTMENT (OUTPATIENT)
Dept: GENERAL RADIOLOGY | Age: 54
DRG: 871 | End: 2024-04-23
Attending: UROLOGY
Payer: COMMERCIAL

## 2024-04-23 ENCOUNTER — APPOINTMENT (OUTPATIENT)
Dept: GENERAL RADIOLOGY | Age: 54
DRG: 871 | End: 2024-04-23
Payer: COMMERCIAL

## 2024-04-23 ENCOUNTER — ANESTHESIA EVENT (OUTPATIENT)
Dept: OPERATING ROOM | Age: 54
End: 2024-04-23
Payer: COMMERCIAL

## 2024-04-23 ENCOUNTER — ANESTHESIA (OUTPATIENT)
Dept: OPERATING ROOM | Age: 54
End: 2024-04-23
Payer: COMMERCIAL

## 2024-04-23 PROBLEM — L03.115 CELLULITIS OF RIGHT LOWER EXTREMITY: Status: ACTIVE | Noted: 2024-04-23

## 2024-04-23 PROBLEM — R78.81 BACTEREMIA: Status: ACTIVE | Noted: 2024-04-23

## 2024-04-23 PROBLEM — A41.9 SEPSIS ASSOCIATED HYPOTENSION (HCC): Status: ACTIVE | Noted: 2024-04-23

## 2024-04-23 PROBLEM — I95.9 SEPSIS ASSOCIATED HYPOTENSION (HCC): Status: ACTIVE | Noted: 2024-04-23

## 2024-04-23 LAB
25(OH)D3 SERPL-MCNC: 9.4 NG/ML (ref 30–100)
ALBUMIN SERPL-MCNC: 2.4 G/DL (ref 3.5–5.2)
ALLEN TEST: POSITIVE
ALP SERPL-CCNC: 74 U/L (ref 40–129)
ALT SERPL-CCNC: 61 U/L (ref 5–41)
AMORPH SED URNS QL MICRO: ABNORMAL
ANION GAP SERPL CALCULATED.3IONS-SCNC: 11 MMOL/L (ref 9–17)
ANION GAP SERPL CALCULATED.3IONS-SCNC: 12 MMOL/L (ref 9–17)
ANION GAP SERPL CALCULATED.3IONS-SCNC: 19 MMOL/L (ref 9–17)
AST SERPL-CCNC: 97 U/L
BACTERIA URNS QL MICRO: ABNORMAL
BILIRUB SERPL-MCNC: 8.3 MG/DL (ref 0.3–1.2)
BILIRUB UR QL STRIP: ABNORMAL
BILIRUB UR QL STRIP: ABNORMAL
BODY FLD TYPE: NORMAL
BUN SERPL-MCNC: 42 MG/DL (ref 6–20)
BUN SERPL-MCNC: 46 MG/DL (ref 6–20)
BUN SERPL-MCNC: 47 MG/DL (ref 6–20)
BUN/CREAT SERPL: 14 (ref 9–20)
BUN/CREAT SERPL: 14 (ref 9–20)
BUN/CREAT SERPL: 15 (ref 9–20)
CALCIUM SERPL-MCNC: 7.7 MG/DL (ref 8.6–10.4)
CALCIUM SERPL-MCNC: 8 MG/DL (ref 8.6–10.4)
CALCIUM SERPL-MCNC: 8.1 MG/DL (ref 8.6–10.4)
CASE NUMBER:: NORMAL
CHLORIDE SERPL-SCNC: 94 MMOL/L (ref 98–107)
CHLORIDE SERPL-SCNC: 94 MMOL/L (ref 98–107)
CHLORIDE SERPL-SCNC: 97 MMOL/L (ref 98–107)
CK SERPL-CCNC: 365 U/L (ref 39–308)
CK SERPL-CCNC: 380 U/L (ref 39–308)
CLARITY UR: ABNORMAL
CLARITY UR: ABNORMAL
CO2 SERPL-SCNC: 16 MMOL/L (ref 20–31)
CO2 SERPL-SCNC: 21 MMOL/L (ref 20–31)
CO2 SERPL-SCNC: 22 MMOL/L (ref 20–31)
COLOR UR: ABNORMAL
COLOR UR: YELLOW
CREAT SERPL-MCNC: 3 MG/DL (ref 0.7–1.2)
CREAT SERPL-MCNC: 3.1 MG/DL (ref 0.7–1.2)
CREAT SERPL-MCNC: 3.3 MG/DL (ref 0.7–1.2)
CREAT UR-MCNC: 212 MG/DL (ref 39–259)
EKG Q-T INTERVAL: 288 MS
EKG QRS DURATION: 86 MS
EKG QTC CALCULATION (BAZETT): 438 MS
EKG R AXIS: -14 DEGREES
EKG T AXIS: -5 DEGREES
EKG VENTRICULAR RATE: 139 BPM
EPI CELLS #/AREA URNS HPF: ABNORMAL /HPF (ref 0–5)
EPI CELLS #/AREA URNS HPF: ABNORMAL /HPF (ref 0–5)
ERYTHROCYTE [DISTWIDTH] IN BLOOD BY AUTOMATED COUNT: 14.8 % (ref 11.8–14.4)
FIO2: 4
GFR SERPL CREATININE-BSD FRML MDRD: 21 ML/MIN/1.73M2
GFR SERPL CREATININE-BSD FRML MDRD: 23 ML/MIN/1.73M2
GFR SERPL CREATININE-BSD FRML MDRD: 24 ML/MIN/1.73M2
GLUCOSE BLD-MCNC: 140 MG/DL (ref 75–110)
GLUCOSE BLD-MCNC: 150 MG/DL (ref 75–110)
GLUCOSE BLD-MCNC: 153 MG/DL (ref 75–110)
GLUCOSE BLD-MCNC: 167 MG/DL (ref 75–110)
GLUCOSE BLD-MCNC: 98 MG/DL (ref 75–110)
GLUCOSE SERPL-MCNC: 108 MG/DL (ref 70–99)
GLUCOSE SERPL-MCNC: 141 MG/DL (ref 70–99)
GLUCOSE SERPL-MCNC: 158 MG/DL (ref 70–99)
GLUCOSE UR STRIP-MCNC: NEGATIVE MG/DL
GLUCOSE UR STRIP-MCNC: NEGATIVE MG/DL
HCT VFR BLD AUTO: 35.8 % (ref 40.7–50.3)
HGB BLD-MCNC: 11.5 G/DL (ref 13–17)
HGB UR QL STRIP.AUTO: ABNORMAL
HGB UR QL STRIP.AUTO: ABNORMAL
KETONES UR STRIP-MCNC: ABNORMAL MG/DL
KETONES UR STRIP-MCNC: ABNORMAL MG/DL
LEUKOCYTE ESTERASE UR QL STRIP: ABNORMAL
LEUKOCYTE ESTERASE UR QL STRIP: ABNORMAL
LYMPHOCYTES NFR FLD: 8 %
MCH RBC QN AUTO: 38 PG (ref 25.2–33.5)
MCHC RBC AUTO-ENTMCNC: 32.1 G/DL (ref 28.4–34.8)
MCV RBC AUTO: 118.2 FL (ref 82.6–102.9)
MICROALBUMIN UR-MCNC: 191 MG/L (ref 0–20)
MICROALBUMIN/CREAT UR-RTO: 90 MCG/MG CREAT (ref 0–17)
MRSA, DNA, NASAL: NEGATIVE
NEGATIVE BASE EXCESS, ART: 3.9 MMOL/L (ref 0–2)
NEUTROPHILS NFR FLD: 5 %
NITRITE UR QL STRIP: POSITIVE
NITRITE UR QL STRIP: POSITIVE
NRBC BLD-RTO: 0 PER 100 WBC
O2 DELIVERY DEVICE: ABNORMAL
PH UR STRIP: 5 [PH] (ref 5–8)
PH UR STRIP: 5 [PH] (ref 5–8)
PHOSPHATE SERPL-MCNC: 6.3 MG/DL (ref 2.5–4.5)
PLATELET # BLD AUTO: 196 K/UL (ref 138–453)
PMV BLD AUTO: 11.1 FL (ref 8.1–13.5)
POC HCO3: 22.1 MMOL/L (ref 21–28)
POC O2 SATURATION: 84.9 % (ref 94–98)
POC PCO2: 43 MM HG (ref 35–48)
POC PH: 7.32 (ref 7.35–7.45)
POC PO2: 54 MM HG (ref 83–108)
POTASSIUM SERPL-SCNC: 5.7 MMOL/L (ref 3.7–5.3)
POTASSIUM SERPL-SCNC: 5.7 MMOL/L (ref 3.7–5.3)
POTASSIUM SERPL-SCNC: 6 MMOL/L (ref 3.7–5.3)
PROT SERPL-MCNC: 5.7 G/DL (ref 6.4–8.3)
PROT UR STRIP-MCNC: ABNORMAL MG/DL
PROT UR STRIP-MCNC: ABNORMAL MG/DL
RBC # BLD AUTO: 3.03 M/UL (ref 4.21–5.77)
RBC # FLD: NORMAL CELLS/UL
RBC #/AREA URNS HPF: ABNORMAL /HPF (ref 0–2)
RBC #/AREA URNS HPF: ABNORMAL /HPF (ref 0–2)
SAMPLE SITE: ABNORMAL
SODIUM SERPL-SCNC: 128 MMOL/L (ref 135–144)
SODIUM SERPL-SCNC: 129 MMOL/L (ref 135–144)
SODIUM SERPL-SCNC: 129 MMOL/L (ref 135–144)
SODIUM UR-SCNC: <20 MMOL/L
SP GR UR STRIP: 1.02 (ref 1–1.03)
SP GR UR STRIP: 1.02 (ref 1–1.03)
SPECIMEN DESCRIPTION: NORMAL
SPECIMEN DESCRIPTION: NORMAL
UNIDENT CELLS NFR FLD: NORMAL %
UROBILINOGEN UR STRIP-ACNC: NORMAL EU/DL (ref 0–1)
UROBILINOGEN UR STRIP-ACNC: NORMAL EU/DL (ref 0–1)
WBC # FLD: 633 CELLS/UL
WBC #/AREA URNS HPF: ABNORMAL /HPF (ref 0–5)
WBC #/AREA URNS HPF: ABNORMAL /HPF (ref 0–5)
WBC OTHER # BLD: 42.1 K/UL (ref 3.5–11.3)

## 2024-04-23 PROCEDURE — 2500000003 HC RX 250 WO HCPCS: Performed by: UROLOGY

## 2024-04-23 PROCEDURE — 82947 ASSAY GLUCOSE BLOOD QUANT: CPT

## 2024-04-23 PROCEDURE — 99222 1ST HOSP IP/OBS MODERATE 55: CPT | Performed by: NURSE PRACTITIONER

## 2024-04-23 PROCEDURE — 2709999900 HC NON-CHARGEABLE SUPPLY: Performed by: UROLOGY

## 2024-04-23 PROCEDURE — 2500000003 HC RX 250 WO HCPCS: Performed by: ANESTHESIOLOGY

## 2024-04-23 PROCEDURE — 6360000002 HC RX W HCPCS: Performed by: INTERNAL MEDICINE

## 2024-04-23 PROCEDURE — 84100 ASSAY OF PHOSPHORUS: CPT

## 2024-04-23 PROCEDURE — 6360000002 HC RX W HCPCS: Performed by: ANESTHESIOLOGY

## 2024-04-23 PROCEDURE — 2580000003 HC RX 258: Performed by: INTERNAL MEDICINE

## 2024-04-23 PROCEDURE — 82570 ASSAY OF URINE CREATININE: CPT

## 2024-04-23 PROCEDURE — P9047 ALBUMIN (HUMAN), 25%, 50ML: HCPCS | Performed by: INTERNAL MEDICINE

## 2024-04-23 PROCEDURE — 2720000010 HC SURG SUPPLY STERILE: Performed by: UROLOGY

## 2024-04-23 PROCEDURE — 6370000000 HC RX 637 (ALT 250 FOR IP): Performed by: INTERNAL MEDICINE

## 2024-04-23 PROCEDURE — 2500000003 HC RX 250 WO HCPCS: Performed by: INTERNAL MEDICINE

## 2024-04-23 PROCEDURE — 84300 ASSAY OF URINE SODIUM: CPT

## 2024-04-23 PROCEDURE — 6370000000 HC RX 637 (ALT 250 FOR IP): Performed by: UROLOGY

## 2024-04-23 PROCEDURE — 82043 UR ALBUMIN QUANTITATIVE: CPT

## 2024-04-23 PROCEDURE — 80053 COMPREHEN METABOLIC PANEL: CPT

## 2024-04-23 PROCEDURE — 6360000002 HC RX W HCPCS: Performed by: UROLOGY

## 2024-04-23 PROCEDURE — 2500000003 HC RX 250 WO HCPCS: Performed by: EMERGENCY MEDICINE

## 2024-04-23 PROCEDURE — 2580000003 HC RX 258: Performed by: UROLOGY

## 2024-04-23 PROCEDURE — 3600000002 HC SURGERY LEVEL 2 BASE: Performed by: UROLOGY

## 2024-04-23 PROCEDURE — 87086 URINE CULTURE/COLONY COUNT: CPT

## 2024-04-23 PROCEDURE — 81001 URINALYSIS AUTO W/SCOPE: CPT

## 2024-04-23 PROCEDURE — 36415 COLL VENOUS BLD VENIPUNCTURE: CPT

## 2024-04-23 PROCEDURE — 2000000000 HC ICU R&B

## 2024-04-23 PROCEDURE — 2700000000 HC OXYGEN THERAPY PER DAY

## 2024-04-23 PROCEDURE — 3700000001 HC ADD 15 MINUTES (ANESTHESIA): Performed by: UROLOGY

## 2024-04-23 PROCEDURE — 0T7D8ZZ DILATION OF URETHRA, VIA NATURAL OR ARTIFICIAL OPENING ENDOSCOPIC: ICD-10-PCS | Performed by: UROLOGY

## 2024-04-23 PROCEDURE — 82803 BLOOD GASES ANY COMBINATION: CPT

## 2024-04-23 PROCEDURE — 99233 SBSQ HOSP IP/OBS HIGH 50: CPT | Performed by: FAMILY MEDICINE

## 2024-04-23 PROCEDURE — 7100000000 HC PACU RECOVERY - FIRST 15 MIN: Performed by: UROLOGY

## 2024-04-23 PROCEDURE — 82306 VITAMIN D 25 HYDROXY: CPT

## 2024-04-23 PROCEDURE — 85027 COMPLETE CBC AUTOMATED: CPT

## 2024-04-23 PROCEDURE — 71045 X-RAY EXAM CHEST 1 VIEW: CPT

## 2024-04-23 PROCEDURE — 2580000003 HC RX 258: Performed by: FAMILY MEDICINE

## 2024-04-23 PROCEDURE — 3700000000 HC ANESTHESIA ATTENDED CARE: Performed by: UROLOGY

## 2024-04-23 PROCEDURE — 36600 WITHDRAWAL OF ARTERIAL BLOOD: CPT

## 2024-04-23 PROCEDURE — 3600000012 HC SURGERY LEVEL 2 ADDTL 15MIN: Performed by: UROLOGY

## 2024-04-23 PROCEDURE — 82550 ASSAY OF CK (CPK): CPT

## 2024-04-23 PROCEDURE — 80048 BASIC METABOLIC PNL TOTAL CA: CPT

## 2024-04-23 PROCEDURE — 6360000002 HC RX W HCPCS: Performed by: FAMILY MEDICINE

## 2024-04-23 PROCEDURE — 94761 N-INVAS EAR/PLS OXIMETRY MLT: CPT

## 2024-04-23 PROCEDURE — 7100000001 HC PACU RECOVERY - ADDTL 15 MIN: Performed by: UROLOGY

## 2024-04-23 PROCEDURE — 93010 ELECTROCARDIOGRAM REPORT: CPT | Performed by: INTERNAL MEDICINE

## 2024-04-23 RX ORDER — SODIUM POLYSTYRENE SULFONATE 15 G/60ML
30 SUSPENSION ORAL; RECTAL ONCE
Status: COMPLETED | OUTPATIENT
Start: 2024-04-23 | End: 2024-04-23

## 2024-04-23 RX ORDER — 0.9 % SODIUM CHLORIDE 0.9 %
1000 INTRAVENOUS SOLUTION INTRAVENOUS ONCE
Status: COMPLETED | OUTPATIENT
Start: 2024-04-23 | End: 2024-04-23

## 2024-04-23 RX ORDER — SODIUM POLYSTYRENE SULFONATE 15 G/60ML
45 SUSPENSION ORAL; RECTAL ONCE
Status: COMPLETED | OUTPATIENT
Start: 2024-04-23 | End: 2024-04-23

## 2024-04-23 RX ORDER — CALCIUM GLUCONATE 10 MG/ML
1000 INJECTION, SOLUTION INTRAVENOUS ONCE
Status: COMPLETED | OUTPATIENT
Start: 2024-04-23 | End: 2024-04-23

## 2024-04-23 RX ORDER — ALBUMIN (HUMAN) 12.5 G/50ML
25 SOLUTION INTRAVENOUS EVERY 6 HOURS
Status: COMPLETED | OUTPATIENT
Start: 2024-04-23 | End: 2024-04-25

## 2024-04-23 RX ORDER — LIDOCAINE HYDROCHLORIDE 20 MG/ML
JELLY TOPICAL PRN
Status: DISCONTINUED | OUTPATIENT
Start: 2024-04-23 | End: 2024-04-23 | Stop reason: ALTCHOICE

## 2024-04-23 RX ORDER — HEPARIN SODIUM 5000 [USP'U]/ML
5000 INJECTION, SOLUTION INTRAVENOUS; SUBCUTANEOUS 2 TIMES DAILY
Status: DISCONTINUED | OUTPATIENT
Start: 2024-04-23 | End: 2024-05-06 | Stop reason: HOSPADM

## 2024-04-23 RX ORDER — SODIUM CHLORIDE 0.9 % (FLUSH) 0.9 %
5-40 SYRINGE (ML) INJECTION PRN
Status: DISCONTINUED | OUTPATIENT
Start: 2024-04-23 | End: 2024-04-23 | Stop reason: HOSPADM

## 2024-04-23 RX ORDER — PROPOFOL 10 MG/ML
INJECTION, EMULSION INTRAVENOUS PRN
Status: DISCONTINUED | OUTPATIENT
Start: 2024-04-23 | End: 2024-04-23 | Stop reason: SDUPTHER

## 2024-04-23 RX ORDER — SODIUM CHLORIDE 0.9 % (FLUSH) 0.9 %
5-40 SYRINGE (ML) INJECTION EVERY 12 HOURS SCHEDULED
Status: DISCONTINUED | OUTPATIENT
Start: 2024-04-23 | End: 2024-04-23 | Stop reason: HOSPADM

## 2024-04-23 RX ORDER — NALOXONE HYDROCHLORIDE 0.4 MG/ML
INJECTION, SOLUTION INTRAMUSCULAR; INTRAVENOUS; SUBCUTANEOUS PRN
Status: DISCONTINUED | OUTPATIENT
Start: 2024-04-23 | End: 2024-04-23 | Stop reason: HOSPADM

## 2024-04-23 RX ORDER — SODIUM CHLORIDE 9 MG/ML
INJECTION, SOLUTION INTRAVENOUS PRN
Status: DISCONTINUED | OUTPATIENT
Start: 2024-04-23 | End: 2024-04-23 | Stop reason: HOSPADM

## 2024-04-23 RX ORDER — DEXTROSE MONOHYDRATE 25 G/50ML
25 INJECTION, SOLUTION INTRAVENOUS ONCE
Status: COMPLETED | OUTPATIENT
Start: 2024-04-23 | End: 2024-04-23

## 2024-04-23 RX ORDER — LIDOCAINE HYDROCHLORIDE 20 MG/ML
INJECTION, SOLUTION EPIDURAL; INFILTRATION; INTRACAUDAL; PERINEURAL PRN
Status: DISCONTINUED | OUTPATIENT
Start: 2024-04-23 | End: 2024-04-23 | Stop reason: SDUPTHER

## 2024-04-23 RX ADMIN — HEPARIN SODIUM 5000 UNITS: 5000 INJECTION INTRAVENOUS; SUBCUTANEOUS at 19:41

## 2024-04-23 RX ADMIN — PROPOFOL 20 MG: 10 INJECTION, EMULSION INTRAVENOUS at 05:20

## 2024-04-23 RX ADMIN — MORPHINE SULFATE 2 MG: 2 INJECTION, SOLUTION INTRAMUSCULAR; INTRAVENOUS at 04:17

## 2024-04-23 RX ADMIN — PROPOFOL 20 MG: 10 INJECTION, EMULSION INTRAVENOUS at 05:23

## 2024-04-23 RX ADMIN — PHENYLEPHRINE HYDROCHLORIDE 235 MCG/MIN: 50 INJECTION INTRAVENOUS at 00:57

## 2024-04-23 RX ADMIN — Medication 40 MCG/MIN: at 06:24

## 2024-04-23 RX ADMIN — SODIUM CHLORIDE: 9 INJECTION, SOLUTION INTRAVENOUS at 08:41

## 2024-04-23 RX ADMIN — PHENYLEPHRINE HYDROCHLORIDE 220 MCG/MIN: 50 INJECTION INTRAVENOUS at 05:00

## 2024-04-23 RX ADMIN — ALBUMIN (HUMAN) 25 G: 0.25 INJECTION, SOLUTION INTRAVENOUS at 14:22

## 2024-04-23 RX ADMIN — FOLIC ACID: 5 INJECTION, SOLUTION INTRAMUSCULAR; INTRAVENOUS; SUBCUTANEOUS at 11:01

## 2024-04-23 RX ADMIN — ALBUMIN (HUMAN) 25 G: 0.25 INJECTION, SOLUTION INTRAVENOUS at 22:22

## 2024-04-23 RX ADMIN — SODIUM CHLORIDE 1000 ML: 9 INJECTION, SOLUTION INTRAVENOUS at 08:12

## 2024-04-23 RX ADMIN — SODIUM POLYSTYRENE SULFONATE 30 G: 15 SUSPENSION ORAL; RECTAL at 19:41

## 2024-04-23 RX ADMIN — SODIUM POLYSTYRENE SULFONATE 45 G: 15 SUSPENSION ORAL; RECTAL at 14:26

## 2024-04-23 RX ADMIN — PROPOFOL 20 MG: 10 INJECTION, EMULSION INTRAVENOUS at 05:10

## 2024-04-23 RX ADMIN — SODIUM BICARBONATE: 84 INJECTION, SOLUTION INTRAVENOUS at 03:07

## 2024-04-23 RX ADMIN — PROPOFOL 20 MG: 10 INJECTION, EMULSION INTRAVENOUS at 05:14

## 2024-04-23 RX ADMIN — CALCIUM GLUCONATE 1000 MG: 10 INJECTION, SOLUTION INTRAVENOUS at 08:07

## 2024-04-23 RX ADMIN — PIPERACILLIN AND TAZOBACTAM 3375 MG: 3; .375 INJECTION, POWDER, LYOPHILIZED, FOR SOLUTION INTRAVENOUS at 01:58

## 2024-04-23 RX ADMIN — PROPOFOL 20 MG: 10 INJECTION, EMULSION INTRAVENOUS at 05:17

## 2024-04-23 RX ADMIN — ENOXAPARIN SODIUM 30 MG: 100 INJECTION SUBCUTANEOUS at 09:54

## 2024-04-23 RX ADMIN — PIPERACILLIN AND TAZOBACTAM 3375 MG: 3; .375 INJECTION, POWDER, LYOPHILIZED, FOR SOLUTION INTRAVENOUS at 08:42

## 2024-04-23 RX ADMIN — PHENYLEPHRINE HYDROCHLORIDE 220 MCG/MIN: 50 INJECTION INTRAVENOUS at 13:43

## 2024-04-23 RX ADMIN — DEXTROSE MONOHYDRATE 25 G: 25 INJECTION, SOLUTION INTRAVENOUS at 08:13

## 2024-04-23 RX ADMIN — INSULIN HUMAN 6 UNITS: 100 INJECTION, SOLUTION PARENTERAL at 08:13

## 2024-04-23 RX ADMIN — LIDOCAINE HYDROCHLORIDE 60 MG: 20 INJECTION, SOLUTION EPIDURAL; INFILTRATION; INTRACAUDAL; PERINEURAL at 05:04

## 2024-04-23 RX ADMIN — SODIUM BICARBONATE: 84 INJECTION, SOLUTION INTRAVENOUS at 11:02

## 2024-04-23 RX ADMIN — SODIUM ZIRCONIUM CYCLOSILICATE 10 G: 10 POWDER, FOR SUSPENSION ORAL at 08:03

## 2024-04-23 RX ADMIN — PHENYLEPHRINE HYDROCHLORIDE 220 MCG/MIN: 50 INJECTION INTRAVENOUS at 08:53

## 2024-04-23 RX ADMIN — PHENYLEPHRINE HYDROCHLORIDE 245 MCG/MIN: 50 INJECTION INTRAVENOUS at 20:13

## 2024-04-23 RX ADMIN — PROPOFOL 20 MG: 10 INJECTION, EMULSION INTRAVENOUS at 05:27

## 2024-04-23 RX ADMIN — CEFEPIME 2000 MG: 2 INJECTION, POWDER, FOR SOLUTION INTRAVENOUS at 13:25

## 2024-04-23 RX ADMIN — PROPOFOL 20 MG: 10 INJECTION, EMULSION INTRAVENOUS at 05:30

## 2024-04-23 RX ADMIN — PROPOFOL 20 MG: 10 INJECTION, EMULSION INTRAVENOUS at 05:07

## 2024-04-23 RX ADMIN — PROPOFOL 40 MG: 10 INJECTION, EMULSION INTRAVENOUS at 05:04

## 2024-04-23 RX ADMIN — SODIUM BICARBONATE: 84 INJECTION, SOLUTION INTRAVENOUS at 18:42

## 2024-04-23 ASSESSMENT — PAIN DESCRIPTION - DESCRIPTORS: DESCRIPTORS: THROBBING;TIGHTNESS

## 2024-04-23 ASSESSMENT — PAIN DESCRIPTION - LOCATION: LOCATION: LEG

## 2024-04-23 ASSESSMENT — PAIN SCALES - GENERAL
PAINLEVEL_OUTOF10: 3
PAINLEVEL_OUTOF10: 6

## 2024-04-23 ASSESSMENT — PAIN - FUNCTIONAL ASSESSMENT
PAIN_FUNCTIONAL_ASSESSMENT: PREVENTS OR INTERFERES SOME ACTIVE ACTIVITIES AND ADLS
PAIN_FUNCTIONAL_ASSESSMENT: NONE - DENIES PAIN

## 2024-04-23 ASSESSMENT — PAIN DESCRIPTION - PAIN TYPE: TYPE: ACUTE PAIN

## 2024-04-23 ASSESSMENT — PAIN DESCRIPTION - ORIENTATION: ORIENTATION: RIGHT;LOWER

## 2024-04-23 ASSESSMENT — PAIN DESCRIPTION - ONSET: ONSET: GRADUAL

## 2024-04-23 ASSESSMENT — PAIN DESCRIPTION - FREQUENCY: FREQUENCY: INTERMITTENT

## 2024-04-23 NOTE — OP NOTE
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       Procedure(s):  CYSTOSCOPY URETHRAL DILATATION WITH LAWLER PLACEMENT    Surgeon(s):  Eliud Cunningham MD    Assistant:   * No surgical staff found *    Anesthesia: Monitor Anesthesia Care    Estimated Blood Loss (mL): Minimal    Complications: None    Specimens:   ID Type Source Tests Collected by Time Destination   1 : CYSTO URINE OPTICAL IU Urine Urine, Cystoscopic CULTURE, URINE Eliud Cunningham MD 4/23/2024 0503        Implants:  * No implants in log *      Drains:   Urinary Catheter 04/23/24 Coude (Active)       Findings:  Infection Present At Time Of Surgery (PATOS) (choose all levels that have infection present):  No infection present  Other Findings: Indications: 53-year-old male, patient in urinary retention, multiple attempts at catheterization have failed, bladder scan still showing evidence of urinary retention patient scheduled for emergent cystoscopy internal urethrotomy insertion of the catheter    Detailed Description of Procedure:   Patient was brought to the operating room, positioned in dorsolithotomy, proper patient identification procedure identification prepping and draping.    We entered the bladder with the direct visual urethrotome ureteroscopy identified a false passage in the proximal urethra as well as a stricture in the bulbous urethra.    Using the urethrotome at the 12 o'clock position this was incised and the instrument could be pushed forward into the prostate which is enlarged with a bladder outlet obstruction the bladder is quite trabeculated, some blood clots in the bladder.    Ureteral orifices effluxing clear urine.    A Glidewire was then inserted    Dilation of the

## 2024-04-23 NOTE — ACP (ADVANCE CARE PLANNING)
Advance Care Planning     Advance Care Planning Activator (Inpatient)  Conversation Note      Date of ACP Conversation: 4/23/2024     Conversation Conducted with: Patient with Decision Making Capacity    ACP Activator: Earnestine Cifuentes RN        Health Care Decision Maker: self     Current Designated Health Care Decision Maker: self     Click here to complete Healthcare Decision Makers including section of the Healthcare Decision Maker Relationship (ie \"Primary\")  Today we HCPOA is sig other Saskia Bryan she will bring in paperwork.     Care Preferences    Ventilation:  \"If you were in your present state of health and suddenly became very ill and were unable to breathe on your own, what would your preference be about the use of a ventilator (breathing machine) if it were available to you?\"      Would the patient desire the use of ventilator (breathing machine)?: yes    Pt agrees to Full code.      [] Yes   [] No   Educated Patient / Decision Maker regarding differences between Advance Directives and portable DNR orders.    Length of ACP Conversation in minutes:  5    Conversation Outcomes:  ACP discussion completed    Follow-up plan:    [] Schedule follow-up conversation to continue planning  [] Referred individual to Provider for additional questions/concerns   [] Advised patient/agent/surrogate to review completed ACP document and update if needed with changes in condition, patient preferences or care setting    [] This note routed to one or more involved healthcare providers

## 2024-04-23 NOTE — ANESTHESIA PRE PROCEDURE
Department of Anesthesiology  Preprocedure Note       Name:  Demarco Wilson   Age:  53 y.o.  :  1970                                          MRN:  [id number]         Date:  2024      Surgeon: Surgeon(s):  Eliud Cunningham MD    Procedure: Procedure(s):  CYSTOSCOPY URETHRAL DILATATION WITH LAWLER PLACEMENT WITH OPTICAL IU    Medications prior to admission:   Prior to Admission medications    Medication Sig Start Date End Date Taking? Authorizing Provider   rifAXIMin (XIFAXAN) 550 MG tablet Take 1 tablet by mouth 2 times daily   Yes Provider, MD Frankie   lactulose (CHRONULAC) 10 GM/15ML solution Take 30 mLs by mouth 3 times daily   Yes Provider, MD Frankie   carvedilol (COREG) 3.125 MG tablet Take 1 tablet by mouth 2 times daily (with meals) 24   Fuentes Falk MD   furosemide (LASIX) 40 MG tablet Take 1 tablet by mouth daily 24   Fuentes Falk MD   spironolactone (ALDACTONE) 100 MG tablet Take 1 tablet by mouth daily 24   Fuentes Falk MD   magnesium oxide (MAG-OX) 400 MG tablet Take 1 tablet by mouth daily 10/17/23   Fuentes Falk MD       Current medications:    Current Facility-Administered Medications   Medication Dose Route Frequency Provider Last Rate Last Admin    [MAR Hold] norepinephrine (LEVOPHED) 16 mg in sodium chloride 0.9 % 250 mL infusion  1-100 mcg/min IntraVENous Continuous Aron Mcnair MD 37.5 mL/hr at 24 0452 40 mcg/min at 24 0452    [MAR Hold] sodium chloride flush 0.9 % injection 5-40 mL  5-40 mL IntraVENous 2 times per day Fuentes Falk MD   10 mL at 24 1931    [MAR Hold] sodium chloride flush 0.9 % injection 5-40 mL  5-40 mL IntraVENous PRN Fuentes Falk MD        [MAR Hold] 0.9 % sodium chloride infusion   IntraVENous PRN Fuentes Falk MD 75 mL/hr at 24 0452 Restarted at 24 0538    [MAR Hold] acetaminophen (TYLENOL) tablet 650 mg  650 mg Oral Q4H PRN Fuentes Falk MD        [MAR Hold] ondansetron (ZOFRAN-ODT)

## 2024-04-23 NOTE — ANESTHESIA POSTPROCEDURE EVALUATION
Department of Anesthesiology  Postprocedure Note    Patient: Demarco Wilson  MRN: [id number]  YOB: 1970  Date of evaluation: 4/23/2024    Procedure Summary       Date: 04/23/24 Room / Location: [facility]    Anesthesia Start: 0452 Anesthesia Stop: 0538    Procedure: CYSTOSCOPY URETHRAL DILATATION WITH LAWLER PLACEMENT WITH OPTICAL IU (Groin) Diagnosis:       Stricture of male urethra, unspecified stricture type      (Stricture of male urethra, unspecified stricture type [N35.919])    Surgeons: Eliud Cunningham MD Responsible Provider: Jayson Fournier DO    Anesthesia Type: MAC ASA Status: 4 - Emergent            Anesthesia Type: No value filed.    Uma Phase I: Uma Score: 9    Uma Phase II:      Anesthesia Post Evaluation    Patient location during evaluation: PACU  Patient participation: complete - patient participated  Level of consciousness: awake and alert  Airway patency: patent  Nausea & Vomiting: no nausea and no vomiting  Cardiovascular status: hemodynamically stable  Respiratory status: acceptable  Hydration status: stable  Pain management: adequate    No notable events documented.

## 2024-04-24 ENCOUNTER — APPOINTMENT (OUTPATIENT)
Dept: GENERAL RADIOLOGY | Age: 54
DRG: 871 | End: 2024-04-24
Payer: COMMERCIAL

## 2024-04-24 LAB
ALBUMIN SERPL-MCNC: 2.4 G/DL (ref 3.5–5.2)
ALP SERPL-CCNC: 56 U/L (ref 40–129)
ALT SERPL-CCNC: 43 U/L (ref 5–41)
ANION GAP SERPL CALCULATED.3IONS-SCNC: 7 MMOL/L (ref 9–17)
AST SERPL-CCNC: 57 U/L
BILIRUB SERPL-MCNC: 5.1 MG/DL (ref 0.3–1.2)
BUN SERPL-MCNC: 57 MG/DL (ref 6–20)
BUN/CREAT SERPL: 17 (ref 9–20)
CALCIUM SERPL-MCNC: 7.7 MG/DL (ref 8.6–10.4)
CHLORIDE SERPL-SCNC: 95 MMOL/L (ref 98–107)
CO2 SERPL-SCNC: 29 MMOL/L (ref 20–31)
CREAT SERPL-MCNC: 3.4 MG/DL (ref 0.7–1.2)
ERYTHROCYTE [DISTWIDTH] IN BLOOD BY AUTOMATED COUNT: 14.6 % (ref 11.8–14.4)
GFR SERPL CREATININE-BSD FRML MDRD: 21 ML/MIN/1.73M2
GLUCOSE BLD-MCNC: 103 MG/DL (ref 75–110)
GLUCOSE BLD-MCNC: 126 MG/DL (ref 75–110)
GLUCOSE BLD-MCNC: 137 MG/DL (ref 75–110)
GLUCOSE BLD-MCNC: 97 MG/DL (ref 75–110)
GLUCOSE SERPL-MCNC: 134 MG/DL (ref 70–99)
HCT VFR BLD AUTO: 28.6 % (ref 40.7–50.3)
HGB BLD-MCNC: 9.6 G/DL (ref 13–17)
MAGNESIUM SERPL-MCNC: 1.8 MG/DL (ref 1.6–2.6)
MCH RBC QN AUTO: 37.5 PG (ref 25.2–33.5)
MCHC RBC AUTO-ENTMCNC: 33.6 G/DL (ref 28.4–34.8)
MCV RBC AUTO: 111.7 FL (ref 82.6–102.9)
MICROORGANISM SPEC CULT: ABNORMAL
MICROORGANISM SPEC CULT: NO GROWTH
NRBC BLD-RTO: 0 PER 100 WBC
PH FLUID: 7
PHOSPHATE SERPL-MCNC: 5.9 MG/DL (ref 2.5–4.5)
PLATELET # BLD AUTO: 84 K/UL (ref 138–453)
PMV BLD AUTO: 10.3 FL (ref 8.1–13.5)
POTASSIUM SERPL-SCNC: 4.9 MMOL/L (ref 3.7–5.3)
PROT SERPL-MCNC: 5.2 G/DL (ref 6.4–8.3)
RBC # BLD AUTO: 2.56 M/UL (ref 4.21–5.77)
SERVICE CMNT-IMP: ABNORMAL
SERVICE CMNT-IMP: ABNORMAL
SODIUM SERPL-SCNC: 131 MMOL/L (ref 135–144)
SPECIMEN DESCRIPTION: ABNORMAL
SPECIMEN DESCRIPTION: ABNORMAL
SPECIMEN DESCRIPTION: NORMAL
SPECIMEN TYPE: NORMAL
SURGICAL PATHOLOGY REPORT: NORMAL
WBC OTHER # BLD: 26.8 K/UL (ref 3.5–11.3)

## 2024-04-24 PROCEDURE — 2580000003 HC RX 258: Performed by: UROLOGY

## 2024-04-24 PROCEDURE — 71045 X-RAY EXAM CHEST 1 VIEW: CPT

## 2024-04-24 PROCEDURE — P9047 ALBUMIN (HUMAN), 25%, 50ML: HCPCS | Performed by: INTERNAL MEDICINE

## 2024-04-24 PROCEDURE — 6360000002 HC RX W HCPCS: Performed by: INTERNAL MEDICINE

## 2024-04-24 PROCEDURE — 83735 ASSAY OF MAGNESIUM: CPT

## 2024-04-24 PROCEDURE — 99233 SBSQ HOSP IP/OBS HIGH 50: CPT | Performed by: FAMILY MEDICINE

## 2024-04-24 PROCEDURE — 99233 SBSQ HOSP IP/OBS HIGH 50: CPT | Performed by: NURSE PRACTITIONER

## 2024-04-24 PROCEDURE — 2700000000 HC OXYGEN THERAPY PER DAY

## 2024-04-24 PROCEDURE — 2000000000 HC ICU R&B

## 2024-04-24 PROCEDURE — 84100 ASSAY OF PHOSPHORUS: CPT

## 2024-04-24 PROCEDURE — 2580000003 HC RX 258: Performed by: INTERNAL MEDICINE

## 2024-04-24 PROCEDURE — 2500000003 HC RX 250 WO HCPCS: Performed by: UROLOGY

## 2024-04-24 PROCEDURE — 6360000002 HC RX W HCPCS: Performed by: UROLOGY

## 2024-04-24 PROCEDURE — 36415 COLL VENOUS BLD VENIPUNCTURE: CPT

## 2024-04-24 PROCEDURE — 82947 ASSAY GLUCOSE BLOOD QUANT: CPT

## 2024-04-24 PROCEDURE — 6370000000 HC RX 637 (ALT 250 FOR IP): Performed by: INTERNAL MEDICINE

## 2024-04-24 PROCEDURE — 85027 COMPLETE CBC AUTOMATED: CPT

## 2024-04-24 PROCEDURE — 80053 COMPREHEN METABOLIC PANEL: CPT

## 2024-04-24 PROCEDURE — 94761 N-INVAS EAR/PLS OXIMETRY MLT: CPT

## 2024-04-24 RX ORDER — GAUZE BANDAGE 2" X 2"
100 BANDAGE TOPICAL DAILY
Status: DISCONTINUED | OUTPATIENT
Start: 2024-04-25 | End: 2024-05-06 | Stop reason: HOSPADM

## 2024-04-24 RX ORDER — SODIUM CHLORIDE 9 MG/ML
INJECTION, SOLUTION INTRAVENOUS CONTINUOUS
Status: DISCONTINUED | OUTPATIENT
Start: 2024-04-24 | End: 2024-04-24

## 2024-04-24 RX ORDER — FOLIC ACID 1 MG/1
1 TABLET ORAL DAILY
Status: DISCONTINUED | OUTPATIENT
Start: 2024-04-25 | End: 2024-05-06 | Stop reason: HOSPADM

## 2024-04-24 RX ORDER — FUROSEMIDE 10 MG/ML
60 INJECTION INTRAMUSCULAR; INTRAVENOUS ONCE
Status: COMPLETED | OUTPATIENT
Start: 2024-04-24 | End: 2024-04-24

## 2024-04-24 RX ORDER — SEVELAMER CARBONATE 800 MG/1
800 TABLET, FILM COATED ORAL
Status: DISCONTINUED | OUTPATIENT
Start: 2024-04-24 | End: 2024-04-27

## 2024-04-24 RX ADMIN — SODIUM CHLORIDE, PRESERVATIVE FREE 10 ML: 5 INJECTION INTRAVENOUS at 20:46

## 2024-04-24 RX ADMIN — ALBUMIN (HUMAN) 25 G: 0.25 INJECTION, SOLUTION INTRAVENOUS at 13:47

## 2024-04-24 RX ADMIN — SODIUM BICARBONATE: 84 INJECTION, SOLUTION INTRAVENOUS at 08:17

## 2024-04-24 RX ADMIN — HEPARIN SODIUM 5000 UNITS: 5000 INJECTION INTRAVENOUS; SUBCUTANEOUS at 09:23

## 2024-04-24 RX ADMIN — ALBUMIN (HUMAN) 25 G: 0.25 INJECTION, SOLUTION INTRAVENOUS at 20:42

## 2024-04-24 RX ADMIN — SODIUM CHLORIDE, PRESERVATIVE FREE 10 ML: 5 INJECTION INTRAVENOUS at 09:23

## 2024-04-24 RX ADMIN — ALBUMIN (HUMAN) 25 G: 0.25 INJECTION, SOLUTION INTRAVENOUS at 02:27

## 2024-04-24 RX ADMIN — PHENYLEPHRINE HYDROCHLORIDE 170 MCG/MIN: 50 INJECTION INTRAVENOUS at 20:52

## 2024-04-24 RX ADMIN — PHENYLEPHRINE HYDROCHLORIDE 215 MCG/MIN: 50 INJECTION INTRAVENOUS at 11:17

## 2024-04-24 RX ADMIN — HEPARIN SODIUM 5000 UNITS: 5000 INJECTION INTRAVENOUS; SUBCUTANEOUS at 20:46

## 2024-04-24 RX ADMIN — SODIUM CHLORIDE: 9 INJECTION, SOLUTION INTRAVENOUS at 09:33

## 2024-04-24 RX ADMIN — ALBUMIN (HUMAN) 25 G: 0.25 INJECTION, SOLUTION INTRAVENOUS at 09:21

## 2024-04-24 RX ADMIN — PHENYLEPHRINE HYDROCHLORIDE 240 MCG/MIN: 50 INJECTION INTRAVENOUS at 02:32

## 2024-04-24 RX ADMIN — SEVELAMER CARBONATE 800 MG: 800 TABLET, FILM COATED ORAL at 18:19

## 2024-04-24 RX ADMIN — FOLIC ACID: 5 INJECTION, SOLUTION INTRAMUSCULAR; INTRAVENOUS; SUBCUTANEOUS at 09:00

## 2024-04-24 RX ADMIN — FUROSEMIDE 60 MG: 10 INJECTION, SOLUTION INTRAMUSCULAR; INTRAVENOUS at 16:26

## 2024-04-24 RX ADMIN — CEFEPIME 2000 MG: 2 INJECTION, POWDER, FOR SOLUTION INTRAVENOUS at 13:01

## 2024-04-24 RX ADMIN — SODIUM BICARBONATE: 84 INJECTION, SOLUTION INTRAVENOUS at 00:44

## 2024-04-24 ASSESSMENT — PAIN SCALES - GENERAL
PAINLEVEL_OUTOF10: 0

## 2024-04-25 ENCOUNTER — APPOINTMENT (OUTPATIENT)
Dept: GENERAL RADIOLOGY | Age: 54
DRG: 871 | End: 2024-04-25
Payer: COMMERCIAL

## 2024-04-25 LAB
ALBUMIN SERPL-MCNC: 3.1 G/DL (ref 3.5–5.2)
ALP SERPL-CCNC: 72 U/L (ref 40–129)
ALT SERPL-CCNC: 35 U/L (ref 5–41)
ANION GAP SERPL CALCULATED.3IONS-SCNC: 9 MMOL/L (ref 9–17)
AST SERPL-CCNC: 39 U/L
BILIRUB SERPL-MCNC: 4.8 MG/DL (ref 0.3–1.2)
BUN SERPL-MCNC: 60 MG/DL (ref 6–20)
BUN/CREAT SERPL: 27 (ref 9–20)
CALCIUM SERPL-MCNC: 7.6 MG/DL (ref 8.6–10.4)
CHLORIDE SERPL-SCNC: 95 MMOL/L (ref 98–107)
CO2 SERPL-SCNC: 29 MMOL/L (ref 20–31)
CREAT SERPL-MCNC: 2.2 MG/DL (ref 0.7–1.2)
ERYTHROCYTE [DISTWIDTH] IN BLOOD BY AUTOMATED COUNT: 14.7 % (ref 11.8–14.4)
GFR SERPL CREATININE-BSD FRML MDRD: 35 ML/MIN/1.73M2
GLUCOSE BLD-MCNC: 113 MG/DL (ref 75–110)
GLUCOSE BLD-MCNC: 120 MG/DL (ref 75–110)
GLUCOSE BLD-MCNC: 150 MG/DL (ref 75–110)
GLUCOSE BLD-MCNC: 94 MG/DL (ref 75–110)
GLUCOSE SERPL-MCNC: 112 MG/DL (ref 70–99)
HCT VFR BLD AUTO: 29.6 % (ref 40.7–50.3)
HGB BLD-MCNC: 9.9 G/DL (ref 13–17)
MAGNESIUM SERPL-MCNC: 1.8 MG/DL (ref 1.6–2.6)
MCH RBC QN AUTO: 36.5 PG (ref 25.2–33.5)
MCHC RBC AUTO-ENTMCNC: 33.4 G/DL (ref 28.4–34.8)
MCV RBC AUTO: 109.2 FL (ref 82.6–102.9)
NRBC BLD-RTO: 0 PER 100 WBC
PHOSPHATE SERPL-MCNC: 4.5 MG/DL (ref 2.5–4.5)
PLATELET # BLD AUTO: 86 K/UL (ref 138–453)
PMV BLD AUTO: 10.8 FL (ref 8.1–13.5)
POTASSIUM SERPL-SCNC: 4 MMOL/L (ref 3.7–5.3)
PROT SERPL-MCNC: 5.3 G/DL (ref 6.4–8.3)
RBC # BLD AUTO: 2.71 M/UL (ref 4.21–5.77)
SODIUM SERPL-SCNC: 133 MMOL/L (ref 135–144)
WBC OTHER # BLD: 25.2 K/UL (ref 3.5–11.3)

## 2024-04-25 PROCEDURE — 2700000000 HC OXYGEN THERAPY PER DAY

## 2024-04-25 PROCEDURE — 6370000000 HC RX 637 (ALT 250 FOR IP): Performed by: UROLOGY

## 2024-04-25 PROCEDURE — P9047 ALBUMIN (HUMAN), 25%, 50ML: HCPCS | Performed by: INTERNAL MEDICINE

## 2024-04-25 PROCEDURE — 84100 ASSAY OF PHOSPHORUS: CPT

## 2024-04-25 PROCEDURE — 6370000000 HC RX 637 (ALT 250 FOR IP): Performed by: INTERNAL MEDICINE

## 2024-04-25 PROCEDURE — 6360000002 HC RX W HCPCS: Performed by: UROLOGY

## 2024-04-25 PROCEDURE — 6360000002 HC RX W HCPCS: Performed by: INTERNAL MEDICINE

## 2024-04-25 PROCEDURE — 2580000003 HC RX 258: Performed by: UROLOGY

## 2024-04-25 PROCEDURE — 83735 ASSAY OF MAGNESIUM: CPT

## 2024-04-25 PROCEDURE — 71045 X-RAY EXAM CHEST 1 VIEW: CPT

## 2024-04-25 PROCEDURE — 99232 SBSQ HOSP IP/OBS MODERATE 35: CPT | Performed by: NURSE PRACTITIONER

## 2024-04-25 PROCEDURE — 94761 N-INVAS EAR/PLS OXIMETRY MLT: CPT

## 2024-04-25 PROCEDURE — 99233 SBSQ HOSP IP/OBS HIGH 50: CPT | Performed by: FAMILY MEDICINE

## 2024-04-25 PROCEDURE — 2000000000 HC ICU R&B

## 2024-04-25 PROCEDURE — 2580000003 HC RX 258: Performed by: INTERNAL MEDICINE

## 2024-04-25 PROCEDURE — 82947 ASSAY GLUCOSE BLOOD QUANT: CPT

## 2024-04-25 PROCEDURE — 36415 COLL VENOUS BLD VENIPUNCTURE: CPT

## 2024-04-25 PROCEDURE — 80053 COMPREHEN METABOLIC PANEL: CPT

## 2024-04-25 PROCEDURE — 85027 COMPLETE CBC AUTOMATED: CPT

## 2024-04-25 RX ADMIN — SEVELAMER CARBONATE 800 MG: 800 TABLET, FILM COATED ORAL at 09:30

## 2024-04-25 RX ADMIN — CEFEPIME 2000 MG: 2 INJECTION, POWDER, FOR SOLUTION INTRAVENOUS at 13:33

## 2024-04-25 RX ADMIN — HEPARIN SODIUM 5000 UNITS: 5000 INJECTION INTRAVENOUS; SUBCUTANEOUS at 21:06

## 2024-04-25 RX ADMIN — Medication 100 MG: at 09:30

## 2024-04-25 RX ADMIN — HEPARIN SODIUM 5000 UNITS: 5000 INJECTION INTRAVENOUS; SUBCUTANEOUS at 09:30

## 2024-04-25 RX ADMIN — ALBUMIN (HUMAN) 25 G: 0.25 INJECTION, SOLUTION INTRAVENOUS at 02:54

## 2024-04-25 RX ADMIN — ALBUMIN (HUMAN) 25 G: 0.25 INJECTION, SOLUTION INTRAVENOUS at 11:28

## 2024-04-25 RX ADMIN — MORPHINE SULFATE 2 MG: 2 INJECTION, SOLUTION INTRAMUSCULAR; INTRAVENOUS at 09:53

## 2024-04-25 RX ADMIN — SEVELAMER CARBONATE 800 MG: 800 TABLET, FILM COATED ORAL at 17:48

## 2024-04-25 RX ADMIN — SODIUM CHLORIDE, PRESERVATIVE FREE 10 ML: 5 INJECTION INTRAVENOUS at 21:06

## 2024-04-25 RX ADMIN — PHENYLEPHRINE HYDROCHLORIDE 130 MCG/MIN: 50 INJECTION INTRAVENOUS at 08:06

## 2024-04-25 RX ADMIN — FOLIC ACID 1 MG: 1 TABLET ORAL at 09:30

## 2024-04-25 RX ADMIN — SODIUM CHLORIDE, PRESERVATIVE FREE 10 ML: 5 INJECTION INTRAVENOUS at 09:00

## 2024-04-25 RX ADMIN — SEVELAMER CARBONATE 800 MG: 800 TABLET, FILM COATED ORAL at 13:28

## 2024-04-25 ASSESSMENT — PAIN SCALES - GENERAL
PAINLEVEL_OUTOF10: 0
PAINLEVEL_OUTOF10: 6
PAINLEVEL_OUTOF10: 10
PAINLEVEL_OUTOF10: 0

## 2024-04-25 ASSESSMENT — PAIN DESCRIPTION - FREQUENCY: FREQUENCY: INTERMITTENT

## 2024-04-25 ASSESSMENT — PAIN DESCRIPTION - LOCATION
LOCATION: LEG
LOCATION: LEG

## 2024-04-25 ASSESSMENT — PAIN DESCRIPTION - ORIENTATION
ORIENTATION: RIGHT;DISTAL
ORIENTATION: RIGHT;DISTAL

## 2024-04-25 ASSESSMENT — PAIN DESCRIPTION - DESCRIPTORS
DESCRIPTORS: THROBBING
DESCRIPTORS: THROBBING

## 2024-04-25 ASSESSMENT — PAIN DESCRIPTION - DIRECTION: RADIATING_TOWARDS: KNEE

## 2024-04-25 ASSESSMENT — PAIN DESCRIPTION - ONSET: ONSET: GRADUAL

## 2024-04-25 ASSESSMENT — PAIN - FUNCTIONAL ASSESSMENT: PAIN_FUNCTIONAL_ASSESSMENT: PREVENTS OR INTERFERES SOME ACTIVE ACTIVITIES AND ADLS

## 2024-04-25 ASSESSMENT — PAIN DESCRIPTION - PAIN TYPE: TYPE: ACUTE PAIN

## 2024-04-26 ENCOUNTER — APPOINTMENT (OUTPATIENT)
Dept: GENERAL RADIOLOGY | Age: 54
DRG: 871 | End: 2024-04-26
Payer: COMMERCIAL

## 2024-04-26 LAB
ANION GAP SERPL CALCULATED.3IONS-SCNC: 6 MMOL/L (ref 9–17)
BASOPHILS # BLD: 0.15 K/UL (ref 0–0.2)
BASOPHILS NFR BLD: 1 % (ref 0–2)
BUN SERPL-MCNC: 57 MG/DL (ref 6–20)
BUN/CREAT SERPL: 52 (ref 9–20)
CALCIUM SERPL-MCNC: 8.4 MG/DL (ref 8.6–10.4)
CHLORIDE SERPL-SCNC: 99 MMOL/L (ref 98–107)
CO2 SERPL-SCNC: 31 MMOL/L (ref 20–31)
CREAT SERPL-MCNC: 1.1 MG/DL (ref 0.7–1.2)
EOSINOPHIL # BLD: 0.6 K/UL (ref 0–0.44)
EOSINOPHILS RELATIVE PERCENT: 4 % (ref 1–4)
ERYTHROCYTE [DISTWIDTH] IN BLOOD BY AUTOMATED COUNT: 14.6 % (ref 11.8–14.4)
GFR SERPL CREATININE-BSD FRML MDRD: 80 ML/MIN/1.73M2
GLUCOSE BLD-MCNC: 112 MG/DL (ref 75–110)
GLUCOSE BLD-MCNC: 113 MG/DL (ref 75–110)
GLUCOSE BLD-MCNC: 119 MG/DL (ref 75–110)
GLUCOSE BLD-MCNC: 97 MG/DL (ref 75–110)
GLUCOSE SERPL-MCNC: 128 MG/DL (ref 70–99)
HCT VFR BLD AUTO: 31.6 % (ref 40.7–50.3)
HGB BLD-MCNC: 10.4 G/DL (ref 13–17)
IMM GRANULOCYTES # BLD AUTO: 0.45 K/UL (ref 0–0.3)
IMM GRANULOCYTES NFR BLD: 3 %
LYMPHOCYTES NFR BLD: 0.76 K/UL (ref 1.1–3.7)
LYMPHOCYTES RELATIVE PERCENT: 5 % (ref 24–43)
MAGNESIUM SERPL-MCNC: 2 MG/DL (ref 1.6–2.6)
MCH RBC QN AUTO: 36.2 PG (ref 25.2–33.5)
MCHC RBC AUTO-ENTMCNC: 32.9 G/DL (ref 28.4–34.8)
MCV RBC AUTO: 110.1 FL (ref 82.6–102.9)
MONOCYTES NFR BLD: 17 % (ref 3–12)
MONOCYTES NFR BLD: 2.57 K/UL (ref 0.1–1.2)
MORPHOLOGY: ABNORMAL
NEUTROPHILS NFR BLD: 70 % (ref 36–65)
NEUTS SEG NFR BLD: 10.57 K/UL (ref 1.5–8.1)
NRBC BLD-RTO: 0 PER 100 WBC
PHOSPHATE SERPL-MCNC: 3.5 MG/DL (ref 2.5–4.5)
PLATELET # BLD AUTO: 58 K/UL (ref 138–453)
PMV BLD AUTO: 10.9 FL (ref 8.1–13.5)
POTASSIUM SERPL-SCNC: 4 MMOL/L (ref 3.7–5.3)
RBC # BLD AUTO: 2.87 M/UL (ref 4.21–5.77)
SODIUM SERPL-SCNC: 136 MMOL/L (ref 135–144)
WBC OTHER # BLD: 15.1 K/UL (ref 3.5–11.3)

## 2024-04-26 PROCEDURE — 94761 N-INVAS EAR/PLS OXIMETRY MLT: CPT

## 2024-04-26 PROCEDURE — 84100 ASSAY OF PHOSPHORUS: CPT

## 2024-04-26 PROCEDURE — 99213 OFFICE O/P EST LOW 20 MIN: CPT

## 2024-04-26 PROCEDURE — 6370000000 HC RX 637 (ALT 250 FOR IP): Performed by: FAMILY MEDICINE

## 2024-04-26 PROCEDURE — 2060000000 HC ICU INTERMEDIATE R&B

## 2024-04-26 PROCEDURE — 6360000002 HC RX W HCPCS: Performed by: NURSE PRACTITIONER

## 2024-04-26 PROCEDURE — 2580000003 HC RX 258: Performed by: UROLOGY

## 2024-04-26 PROCEDURE — 83735 ASSAY OF MAGNESIUM: CPT

## 2024-04-26 PROCEDURE — 99232 SBSQ HOSP IP/OBS MODERATE 35: CPT | Performed by: NURSE PRACTITIONER

## 2024-04-26 PROCEDURE — 6360000002 HC RX W HCPCS: Performed by: INTERNAL MEDICINE

## 2024-04-26 PROCEDURE — 71045 X-RAY EXAM CHEST 1 VIEW: CPT

## 2024-04-26 PROCEDURE — 85025 COMPLETE CBC W/AUTO DIFF WBC: CPT

## 2024-04-26 PROCEDURE — 2700000000 HC OXYGEN THERAPY PER DAY

## 2024-04-26 PROCEDURE — 6370000000 HC RX 637 (ALT 250 FOR IP): Performed by: UROLOGY

## 2024-04-26 PROCEDURE — 80048 BASIC METABOLIC PNL TOTAL CA: CPT

## 2024-04-26 PROCEDURE — 6360000002 HC RX W HCPCS: Performed by: UROLOGY

## 2024-04-26 PROCEDURE — 36415 COLL VENOUS BLD VENIPUNCTURE: CPT

## 2024-04-26 PROCEDURE — 6370000000 HC RX 637 (ALT 250 FOR IP): Performed by: INTERNAL MEDICINE

## 2024-04-26 PROCEDURE — 86022 PLATELET ANTIBODIES: CPT

## 2024-04-26 PROCEDURE — 99233 SBSQ HOSP IP/OBS HIGH 50: CPT | Performed by: FAMILY MEDICINE

## 2024-04-26 PROCEDURE — 2580000003 HC RX 258: Performed by: NURSE PRACTITIONER

## 2024-04-26 PROCEDURE — 82947 ASSAY GLUCOSE BLOOD QUANT: CPT

## 2024-04-26 RX ORDER — CALCIUM CARBONATE 500 MG/1
750 TABLET, CHEWABLE ORAL 3 TIMES DAILY PRN
Status: DISCONTINUED | OUTPATIENT
Start: 2024-04-26 | End: 2024-05-06 | Stop reason: HOSPADM

## 2024-04-26 RX ORDER — ACETAZOLAMIDE 500 MG/5ML
500 INJECTION, POWDER, LYOPHILIZED, FOR SOLUTION INTRAVENOUS EVERY 12 HOURS
Status: COMPLETED | OUTPATIENT
Start: 2024-04-26 | End: 2024-04-26

## 2024-04-26 RX ORDER — FUROSEMIDE 10 MG/ML
40 INJECTION INTRAMUSCULAR; INTRAVENOUS ONCE
Status: COMPLETED | OUTPATIENT
Start: 2024-04-26 | End: 2024-04-26

## 2024-04-26 RX ADMIN — HEPARIN SODIUM 5000 UNITS: 5000 INJECTION INTRAVENOUS; SUBCUTANEOUS at 08:28

## 2024-04-26 RX ADMIN — CEFEPIME 2000 MG: 2 INJECTION, POWDER, FOR SOLUTION INTRAVENOUS at 12:35

## 2024-04-26 RX ADMIN — MORPHINE SULFATE 2 MG: 2 INJECTION, SOLUTION INTRAMUSCULAR; INTRAVENOUS at 05:42

## 2024-04-26 RX ADMIN — FOLIC ACID 1 MG: 1 TABLET ORAL at 08:28

## 2024-04-26 RX ADMIN — Medication 100 MG: at 08:28

## 2024-04-26 RX ADMIN — CEFEPIME 2000 MG: 2 INJECTION, POWDER, FOR SOLUTION INTRAVENOUS at 01:23

## 2024-04-26 RX ADMIN — MORPHINE SULFATE 2 MG: 2 INJECTION, SOLUTION INTRAMUSCULAR; INTRAVENOUS at 01:18

## 2024-04-26 RX ADMIN — SEVELAMER CARBONATE 800 MG: 800 TABLET, FILM COATED ORAL at 17:32

## 2024-04-26 RX ADMIN — SEVELAMER CARBONATE 800 MG: 800 TABLET, FILM COATED ORAL at 08:28

## 2024-04-26 RX ADMIN — SODIUM CHLORIDE, PRESERVATIVE FREE 10 ML: 5 INJECTION INTRAVENOUS at 22:04

## 2024-04-26 RX ADMIN — MORPHINE SULFATE 2 MG: 2 INJECTION, SOLUTION INTRAMUSCULAR; INTRAVENOUS at 09:00

## 2024-04-26 RX ADMIN — SEVELAMER CARBONATE 800 MG: 800 TABLET, FILM COATED ORAL at 12:33

## 2024-04-26 RX ADMIN — ACETAZOLAMIDE SODIUM 500 MG: 500 INJECTION, POWDER, LYOPHILIZED, FOR SOLUTION INTRAVENOUS at 21:59

## 2024-04-26 RX ADMIN — CEFEPIME 2000 MG: 2 INJECTION, POWDER, FOR SOLUTION INTRAVENOUS at 20:47

## 2024-04-26 RX ADMIN — SODIUM CHLORIDE, PRESERVATIVE FREE 10 ML: 5 INJECTION INTRAVENOUS at 08:29

## 2024-04-26 RX ADMIN — FUROSEMIDE 40 MG: 10 INJECTION, SOLUTION INTRAMUSCULAR; INTRAVENOUS at 10:59

## 2024-04-26 RX ADMIN — Medication 750 MG: at 22:27

## 2024-04-26 RX ADMIN — ACETAZOLAMIDE SODIUM 500 MG: 500 INJECTION, POWDER, LYOPHILIZED, FOR SOLUTION INTRAVENOUS at 11:20

## 2024-04-26 ASSESSMENT — PAIN DESCRIPTION - LOCATION
LOCATION: LEG

## 2024-04-26 ASSESSMENT — PAIN DESCRIPTION - FREQUENCY: FREQUENCY: INTERMITTENT

## 2024-04-26 ASSESSMENT — PAIN SCALES - GENERAL
PAINLEVEL_OUTOF10: 0
PAINLEVEL_OUTOF10: 7
PAINLEVEL_OUTOF10: 7
PAINLEVEL_OUTOF10: 0
PAINLEVEL_OUTOF10: 8

## 2024-04-26 ASSESSMENT — PAIN DESCRIPTION - DIRECTION: RADIATING_TOWARDS: RIGHT LOWER LEG

## 2024-04-26 ASSESSMENT — PAIN DESCRIPTION - ONSET: ONSET: GRADUAL

## 2024-04-26 ASSESSMENT — PAIN DESCRIPTION - PAIN TYPE
TYPE: ACUTE PAIN
TYPE: ACUTE PAIN

## 2024-04-26 ASSESSMENT — PAIN DESCRIPTION - ORIENTATION
ORIENTATION: RIGHT

## 2024-04-26 ASSESSMENT — PAIN DESCRIPTION - DESCRIPTORS
DESCRIPTORS: CRUSHING
DESCRIPTORS: ACHING
DESCRIPTORS: ACHING

## 2024-04-26 ASSESSMENT — PAIN - FUNCTIONAL ASSESSMENT
PAIN_FUNCTIONAL_ASSESSMENT: PREVENTS OR INTERFERES SOME ACTIVE ACTIVITIES AND ADLS
PAIN_FUNCTIONAL_ASSESSMENT: PREVENTS OR INTERFERES WITH MANY ACTIVE NOT PASSIVE ACTIVITIES

## 2024-04-26 NOTE — ADT AUTH CERT
Patient Demographics    Name Patient ID SSN Gender Identity Birth Date   Demarco Wilson [id number]  Male [birth year] (53 yrs)     Address Phone Email Employer    [address]  [place] [address] [phone] (H) -- [employer] Race Occupation Emp Status    -- White (non-) -- Full Time      Reg Status PCP Date Last Verified Next Review Date    Verified Fuentes Falk MD  [phone] 04/17/24 05/01/24      Admission Date Discharge Date Admitting Provider     04/22/24 -- Fuentes Falk MD       Marital Status Orthodox      Single None        Emergency Contact 1   Saskia Adams ()  [phone] (H)     Physician Progress Notes  Notes from 04/23/24 through 04/26/24  Progress Notes by Fuentes Falk MD at 4/26/2024 12:14 PM    Author: Fuentes Falk MD Service: Internal Medicine Author Type: Physician   Filed: 4/26/2024 12:16 PM Date of Service: 4/26/2024 12:14 PM Status: Signed   : Fuentes Falk MD (Physician)   Expand All Collapse All  Progress Note     Subjective:  Follow-up with septic shock  MACARIO  Cellulitis of right leg  Alcoholic cirrhosis with ascites and pleural effusion  Anasarca  The patient is awake and alert.  No problems overnight.  Denies chest pain, angina, and dyspnea.  Denies abdominal pain.  Tolerating diet.  No nausea or vomiting.     Admit Date:  4/22/2024     Patient Seen, Chart, Labs, Radiology studies, and Consults reviewed.     Objective:   /74   Pulse (!) 103   Temp 98 °F (36.7 °C) (Oral)   Resp 20   Ht 1.88 m (6' 2\")   Wt (!) 140.9 kg (310 lb 10.1 oz)   SpO2 96%   BMI 39.88 kg/m²     Intake/Output Summary (Last 24 hours) at 4/26/2024 1214  Last data filed at 4/26/2024 0534      Gross per 24 hour   Intake 114.42 ml   Output 2530 ml   Net -2415.58 ml      General appearance - alert, well appearing, and in no distress and oriented to person, place, and time  Heart:  RRR, no murmurs, gallops, or rubs, extrasystoles.  Lungs:  CTA bilaterally,

## 2024-04-27 ENCOUNTER — APPOINTMENT (OUTPATIENT)
Dept: GENERAL RADIOLOGY | Age: 54
DRG: 871 | End: 2024-04-27
Payer: COMMERCIAL

## 2024-04-27 LAB
ANION GAP SERPL CALCULATED.3IONS-SCNC: 7 MMOL/L (ref 9–17)
BASOPHILS # BLD: 0.17 K/UL (ref 0–0.2)
BASOPHILS NFR BLD: 1 % (ref 0–2)
BUN SERPL-MCNC: 54 MG/DL (ref 6–20)
BUN/CREAT SERPL: 60 (ref 9–20)
CALCIUM SERPL-MCNC: 8.7 MG/DL (ref 8.6–10.4)
CHLORIDE SERPL-SCNC: 99 MMOL/L (ref 98–107)
CO2 SERPL-SCNC: 31 MMOL/L (ref 20–31)
CREAT SERPL-MCNC: 0.9 MG/DL (ref 0.7–1.2)
EOSINOPHIL # BLD: 0.67 K/UL (ref 0–0.44)
EOSINOPHILS RELATIVE PERCENT: 4 % (ref 1–4)
ERYTHROCYTE [DISTWIDTH] IN BLOOD BY AUTOMATED COUNT: 14.8 % (ref 11.8–14.4)
GFR SERPL CREATININE-BSD FRML MDRD: >90 ML/MIN/1.73M2
GLUCOSE BLD-MCNC: 108 MG/DL (ref 75–110)
GLUCOSE BLD-MCNC: 122 MG/DL (ref 75–110)
GLUCOSE BLD-MCNC: 143 MG/DL (ref 75–110)
GLUCOSE BLD-MCNC: 146 MG/DL (ref 75–110)
GLUCOSE SERPL-MCNC: 102 MG/DL (ref 70–99)
HCT VFR BLD AUTO: 33.8 % (ref 40.7–50.3)
HGB BLD-MCNC: 11.1 G/DL (ref 13–17)
IMM GRANULOCYTES # BLD AUTO: 0.84 K/UL (ref 0–0.3)
IMM GRANULOCYTES NFR BLD: 5 %
INR PPP: 2.9
LYMPHOCYTES NFR BLD: 0.84 K/UL (ref 1.1–3.7)
LYMPHOCYTES RELATIVE PERCENT: 5 % (ref 24–43)
MAGNESIUM SERPL-MCNC: 1.9 MG/DL (ref 1.6–2.6)
MCH RBC QN AUTO: 36.6 PG (ref 25.2–33.5)
MCHC RBC AUTO-ENTMCNC: 32.8 G/DL (ref 28.4–34.8)
MCV RBC AUTO: 111.6 FL (ref 82.6–102.9)
MONOCYTES NFR BLD: 17 % (ref 3–12)
MONOCYTES NFR BLD: 2.84 K/UL (ref 0.1–1.2)
MORPHOLOGY: ABNORMAL
NEUTROPHILS NFR BLD: 68 % (ref 36–65)
NEUTS SEG NFR BLD: 11.34 K/UL (ref 1.5–8.1)
NRBC BLD-RTO: 0 PER 100 WBC
PF4 HEPARIN CMPLX IGG SERPL IA: 0.15 O.D. (ref 0–0.4)
PHOSPHATE SERPL-MCNC: 3.1 MG/DL (ref 2.5–4.5)
PLATELET # BLD AUTO: 58 K/UL (ref 138–453)
PMV BLD AUTO: 11.2 FL (ref 8.1–13.5)
POTASSIUM SERPL-SCNC: 3.9 MMOL/L (ref 3.7–5.3)
PROTHROMBIN TIME: 29.7 SEC (ref 11.5–14.2)
RBC # BLD AUTO: 3.03 M/UL (ref 4.21–5.77)
SODIUM SERPL-SCNC: 137 MMOL/L (ref 135–144)
WBC OTHER # BLD: 16.7 K/UL (ref 3.5–11.3)

## 2024-04-27 PROCEDURE — 80048 BASIC METABOLIC PNL TOTAL CA: CPT

## 2024-04-27 PROCEDURE — 82947 ASSAY GLUCOSE BLOOD QUANT: CPT

## 2024-04-27 PROCEDURE — 2580000003 HC RX 258: Performed by: UROLOGY

## 2024-04-27 PROCEDURE — 6370000000 HC RX 637 (ALT 250 FOR IP): Performed by: UROLOGY

## 2024-04-27 PROCEDURE — 6360000002 HC RX W HCPCS: Performed by: NURSE PRACTITIONER

## 2024-04-27 PROCEDURE — 85025 COMPLETE CBC W/AUTO DIFF WBC: CPT

## 2024-04-27 PROCEDURE — 2060000000 HC ICU INTERMEDIATE R&B

## 2024-04-27 PROCEDURE — 71045 X-RAY EXAM CHEST 1 VIEW: CPT

## 2024-04-27 PROCEDURE — 94761 N-INVAS EAR/PLS OXIMETRY MLT: CPT

## 2024-04-27 PROCEDURE — 2580000003 HC RX 258: Performed by: NURSE PRACTITIONER

## 2024-04-27 PROCEDURE — 85610 PROTHROMBIN TIME: CPT

## 2024-04-27 PROCEDURE — 99233 SBSQ HOSP IP/OBS HIGH 50: CPT | Performed by: INTERNAL MEDICINE

## 2024-04-27 PROCEDURE — 99233 SBSQ HOSP IP/OBS HIGH 50: CPT | Performed by: FAMILY MEDICINE

## 2024-04-27 PROCEDURE — 84100 ASSAY OF PHOSPHORUS: CPT

## 2024-04-27 PROCEDURE — 2700000000 HC OXYGEN THERAPY PER DAY

## 2024-04-27 PROCEDURE — 36415 COLL VENOUS BLD VENIPUNCTURE: CPT

## 2024-04-27 PROCEDURE — 83735 ASSAY OF MAGNESIUM: CPT

## 2024-04-27 PROCEDURE — 6360000002 HC RX W HCPCS: Performed by: UROLOGY

## 2024-04-27 RX ADMIN — CEFEPIME 2000 MG: 2 INJECTION, POWDER, FOR SOLUTION INTRAVENOUS at 12:01

## 2024-04-27 RX ADMIN — SODIUM CHLORIDE, PRESERVATIVE FREE 10 ML: 5 INJECTION INTRAVENOUS at 09:19

## 2024-04-27 RX ADMIN — Medication 100 MG: at 09:19

## 2024-04-27 RX ADMIN — CEFEPIME 2000 MG: 2 INJECTION, POWDER, FOR SOLUTION INTRAVENOUS at 04:59

## 2024-04-27 RX ADMIN — CEFEPIME 2000 MG: 2 INJECTION, POWDER, FOR SOLUTION INTRAVENOUS at 21:32

## 2024-04-27 RX ADMIN — MORPHINE SULFATE 2 MG: 2 INJECTION, SOLUTION INTRAMUSCULAR; INTRAVENOUS at 13:12

## 2024-04-27 RX ADMIN — FOLIC ACID 1 MG: 1 TABLET ORAL at 09:19

## 2024-04-27 ASSESSMENT — PAIN DESCRIPTION - LOCATION: LOCATION: LEG

## 2024-04-27 ASSESSMENT — PAIN DESCRIPTION - DESCRIPTORS: DESCRIPTORS: ACHING

## 2024-04-27 ASSESSMENT — PAIN SCALES - GENERAL
PAINLEVEL_OUTOF10: 0
PAINLEVEL_OUTOF10: 9
PAINLEVEL_OUTOF10: 3

## 2024-04-27 ASSESSMENT — PAIN DESCRIPTION - ORIENTATION: ORIENTATION: RIGHT

## 2024-04-28 LAB
ANION GAP SERPL CALCULATED.3IONS-SCNC: 8 MMOL/L (ref 9–17)
BASOPHILS # BLD: 0.21 K/UL (ref 0–0.2)
BASOPHILS NFR BLD: 1 % (ref 0–2)
BUN SERPL-MCNC: 54 MG/DL (ref 6–20)
BUN/CREAT SERPL: 68 (ref 9–20)
CALCIUM SERPL-MCNC: 8.5 MG/DL (ref 8.6–10.4)
CHLORIDE SERPL-SCNC: 97 MMOL/L (ref 98–107)
CO2 SERPL-SCNC: 29 MMOL/L (ref 20–31)
CREAT SERPL-MCNC: 0.8 MG/DL (ref 0.7–1.2)
EOSINOPHIL # BLD: 0.82 K/UL (ref 0–0.44)
EOSINOPHILS RELATIVE PERCENT: 4 % (ref 1–4)
ERYTHROCYTE [DISTWIDTH] IN BLOOD BY AUTOMATED COUNT: 14.7 % (ref 11.8–14.4)
GFR SERPL CREATININE-BSD FRML MDRD: >90 ML/MIN/1.73M2
GLUCOSE BLD-MCNC: 108 MG/DL (ref 75–110)
GLUCOSE SERPL-MCNC: 103 MG/DL (ref 70–99)
HCT VFR BLD AUTO: 33.2 % (ref 40.7–50.3)
HGB BLD-MCNC: 11 G/DL (ref 13–17)
IMM GRANULOCYTES # BLD AUTO: 1.85 K/UL (ref 0–0.3)
IMM GRANULOCYTES NFR BLD: 9 %
LYMPHOCYTES NFR BLD: 1.24 K/UL (ref 1.1–3.7)
LYMPHOCYTES RELATIVE PERCENT: 6 % (ref 24–43)
MAGNESIUM SERPL-MCNC: 1.8 MG/DL (ref 1.6–2.6)
MCH RBC QN AUTO: 37 PG (ref 25.2–33.5)
MCHC RBC AUTO-ENTMCNC: 33.1 G/DL (ref 28.4–34.8)
MCV RBC AUTO: 111.8 FL (ref 82.6–102.9)
MICROORGANISM SPEC CULT: NORMAL
MICROORGANISM/AGENT SPEC: NORMAL
MONOCYTES NFR BLD: 11 % (ref 3–12)
MONOCYTES NFR BLD: 2.27 K/UL (ref 0.1–1.2)
MORPHOLOGY: ABNORMAL
NEUTROPHILS NFR BLD: 69 % (ref 36–65)
NEUTS SEG NFR BLD: 14.21 K/UL (ref 1.5–8.1)
NRBC BLD-RTO: 0 PER 100 WBC
PHOSPHATE SERPL-MCNC: 2.9 MG/DL (ref 2.5–4.5)
PLATELET # BLD AUTO: 63 K/UL (ref 138–453)
PMV BLD AUTO: 11.5 FL (ref 8.1–13.5)
POTASSIUM SERPL-SCNC: 3.7 MMOL/L (ref 3.7–5.3)
RBC # BLD AUTO: 2.97 M/UL (ref 4.21–5.77)
SODIUM SERPL-SCNC: 134 MMOL/L (ref 135–144)
SPECIMEN DESCRIPTION: NORMAL
WBC OTHER # BLD: 20.6 K/UL (ref 3.5–11.3)

## 2024-04-28 PROCEDURE — 2580000003 HC RX 258: Performed by: NURSE PRACTITIONER

## 2024-04-28 PROCEDURE — 6370000000 HC RX 637 (ALT 250 FOR IP): Performed by: UROLOGY

## 2024-04-28 PROCEDURE — 82947 ASSAY GLUCOSE BLOOD QUANT: CPT

## 2024-04-28 PROCEDURE — 99233 SBSQ HOSP IP/OBS HIGH 50: CPT | Performed by: FAMILY MEDICINE

## 2024-04-28 PROCEDURE — 80048 BASIC METABOLIC PNL TOTAL CA: CPT

## 2024-04-28 PROCEDURE — 97167 OT EVAL HIGH COMPLEX 60 MIN: CPT

## 2024-04-28 PROCEDURE — 6360000002 HC RX W HCPCS: Performed by: INTERNAL MEDICINE

## 2024-04-28 PROCEDURE — 36415 COLL VENOUS BLD VENIPUNCTURE: CPT

## 2024-04-28 PROCEDURE — 2060000000 HC ICU INTERMEDIATE R&B

## 2024-04-28 PROCEDURE — 6360000002 HC RX W HCPCS: Performed by: NURSE PRACTITIONER

## 2024-04-28 PROCEDURE — 97530 THERAPEUTIC ACTIVITIES: CPT

## 2024-04-28 PROCEDURE — 85025 COMPLETE CBC W/AUTO DIFF WBC: CPT

## 2024-04-28 PROCEDURE — 6370000000 HC RX 637 (ALT 250 FOR IP)

## 2024-04-28 PROCEDURE — 94761 N-INVAS EAR/PLS OXIMETRY MLT: CPT

## 2024-04-28 PROCEDURE — 83735 ASSAY OF MAGNESIUM: CPT

## 2024-04-28 PROCEDURE — 2580000003 HC RX 258: Performed by: UROLOGY

## 2024-04-28 PROCEDURE — 2580000003 HC RX 258: Performed by: INTERNAL MEDICINE

## 2024-04-28 PROCEDURE — 97110 THERAPEUTIC EXERCISES: CPT

## 2024-04-28 PROCEDURE — 84100 ASSAY OF PHOSPHORUS: CPT

## 2024-04-28 PROCEDURE — 97535 SELF CARE MNGMENT TRAINING: CPT

## 2024-04-28 PROCEDURE — 6360000002 HC RX W HCPCS

## 2024-04-28 PROCEDURE — 97163 PT EVAL HIGH COMPLEX 45 MIN: CPT

## 2024-04-28 PROCEDURE — 2700000000 HC OXYGEN THERAPY PER DAY

## 2024-04-28 PROCEDURE — 6360000002 HC RX W HCPCS: Performed by: UROLOGY

## 2024-04-28 RX ORDER — POTASSIUM CHLORIDE 20 MEQ/1
20 TABLET, EXTENDED RELEASE ORAL ONCE
Status: COMPLETED | OUTPATIENT
Start: 2024-04-28 | End: 2024-04-28

## 2024-04-28 RX ORDER — FUROSEMIDE 10 MG/ML
40 INJECTION INTRAMUSCULAR; INTRAVENOUS 2 TIMES DAILY
Status: DISCONTINUED | OUTPATIENT
Start: 2024-04-28 | End: 2024-05-01

## 2024-04-28 RX ADMIN — SODIUM CHLORIDE, PRESERVATIVE FREE 10 ML: 5 INJECTION INTRAVENOUS at 21:58

## 2024-04-28 RX ADMIN — MORPHINE SULFATE 2 MG: 2 INJECTION, SOLUTION INTRAMUSCULAR; INTRAVENOUS at 12:27

## 2024-04-28 RX ADMIN — Medication 2000 MG: at 23:24

## 2024-04-28 RX ADMIN — CEFEPIME 2000 MG: 2 INJECTION, POWDER, FOR SOLUTION INTRAVENOUS at 04:32

## 2024-04-28 RX ADMIN — Medication 100 MG: at 08:02

## 2024-04-28 RX ADMIN — SODIUM CHLORIDE, PRESERVATIVE FREE 10 ML: 5 INJECTION INTRAVENOUS at 08:02

## 2024-04-28 RX ADMIN — FUROSEMIDE 40 MG: 10 INJECTION, SOLUTION INTRAMUSCULAR; INTRAVENOUS at 17:43

## 2024-04-28 RX ADMIN — POTASSIUM CHLORIDE 20 MEQ: 1500 TABLET, EXTENDED RELEASE ORAL at 17:43

## 2024-04-28 RX ADMIN — FOLIC ACID 1 MG: 1 TABLET ORAL at 08:02

## 2024-04-28 RX ADMIN — Medication 2000 MG: at 12:59

## 2024-04-28 ASSESSMENT — PAIN SCALES - GENERAL
PAINLEVEL_OUTOF10: 1
PAINLEVEL_OUTOF10: 6

## 2024-04-28 ASSESSMENT — PAIN - FUNCTIONAL ASSESSMENT: PAIN_FUNCTIONAL_ASSESSMENT: PREVENTS OR INTERFERES SOME ACTIVE ACTIVITIES AND ADLS

## 2024-04-28 ASSESSMENT — PAIN DESCRIPTION - ORIENTATION: ORIENTATION: MID

## 2024-04-28 ASSESSMENT — PAIN DESCRIPTION - DESCRIPTORS: DESCRIPTORS: ACHING

## 2024-04-28 ASSESSMENT — PAIN DESCRIPTION - LOCATION: LOCATION: LEG

## 2024-04-29 ENCOUNTER — APPOINTMENT (OUTPATIENT)
Dept: CT IMAGING | Age: 54
DRG: 871 | End: 2024-04-29
Payer: COMMERCIAL

## 2024-04-29 LAB
ANION GAP SERPL CALCULATED.3IONS-SCNC: 9 MMOL/L (ref 9–17)
BASOPHILS # BLD: 0 K/UL (ref 0–0.2)
BASOPHILS NFR BLD: 0 % (ref 0–2)
BUN SERPL-MCNC: 58 MG/DL (ref 6–20)
BUN/CREAT SERPL: 64 (ref 9–20)
CALCIUM SERPL-MCNC: 8.7 MG/DL (ref 8.6–10.4)
CHLORIDE SERPL-SCNC: 96 MMOL/L (ref 98–107)
CO2 SERPL-SCNC: 29 MMOL/L (ref 20–31)
CREAT SERPL-MCNC: 0.9 MG/DL (ref 0.7–1.2)
EOSINOPHIL # BLD: 0.97 K/UL (ref 0–0.44)
EOSINOPHILS RELATIVE PERCENT: 3 % (ref 1–4)
ERYTHROCYTE [DISTWIDTH] IN BLOOD BY AUTOMATED COUNT: 14.9 % (ref 11.8–14.4)
GFR SERPL CREATININE-BSD FRML MDRD: >90 ML/MIN/1.73M2
GLUCOSE BLD-MCNC: 114 MG/DL (ref 75–110)
GLUCOSE SERPL-MCNC: 111 MG/DL (ref 70–99)
HCT VFR BLD AUTO: 34.2 % (ref 40.7–50.3)
HGB BLD-MCNC: 11.5 G/DL (ref 13–17)
IMM GRANULOCYTES # BLD AUTO: 2.91 K/UL (ref 0–0.3)
IMM GRANULOCYTES NFR BLD: 9 %
LACTATE BLDV-SCNC: 2.2 MMOL/L (ref 0.5–1.9)
LACTATE BLDV-SCNC: 2.5 MMOL/L (ref 0.5–1.9)
LYMPHOCYTES NFR BLD: 1.62 K/UL (ref 1.1–3.7)
LYMPHOCYTES RELATIVE PERCENT: 5 % (ref 24–43)
MAGNESIUM SERPL-MCNC: 1.9 MG/DL (ref 1.6–2.6)
MCH RBC QN AUTO: 36.9 PG (ref 25.2–33.5)
MCHC RBC AUTO-ENTMCNC: 33.6 G/DL (ref 28.4–34.8)
MCV RBC AUTO: 109.6 FL (ref 82.6–102.9)
MONOCYTES NFR BLD: 2.58 K/UL (ref 0.1–1.2)
MONOCYTES NFR BLD: 8 % (ref 3–12)
NEUTROPHILS NFR BLD: 75 % (ref 36–65)
NEUTS SEG NFR BLD: 24.22 K/UL (ref 1.5–8.1)
NRBC BLD-RTO: 0.1 PER 100 WBC
PHOSPHATE SERPL-MCNC: 3 MG/DL (ref 2.5–4.5)
PLATELET # BLD AUTO: 83 K/UL (ref 138–453)
PMV BLD AUTO: 10.8 FL (ref 8.1–13.5)
POTASSIUM SERPL-SCNC: 3.9 MMOL/L (ref 3.7–5.3)
PROCALCITONIN SERPL-MCNC: 1.94 NG/ML (ref 0–0.09)
RBC # BLD AUTO: 3.12 M/UL (ref 4.21–5.77)
SODIUM SERPL-SCNC: 134 MMOL/L (ref 135–144)
WBC OTHER # BLD: 32.3 K/UL (ref 3.5–11.3)

## 2024-04-29 PROCEDURE — 2580000003 HC RX 258: Performed by: UROLOGY

## 2024-04-29 PROCEDURE — 2700000000 HC OXYGEN THERAPY PER DAY

## 2024-04-29 PROCEDURE — 97530 THERAPEUTIC ACTIVITIES: CPT

## 2024-04-29 PROCEDURE — 6360000002 HC RX W HCPCS: Performed by: INTERNAL MEDICINE

## 2024-04-29 PROCEDURE — 99233 SBSQ HOSP IP/OBS HIGH 50: CPT | Performed by: INTERNAL MEDICINE

## 2024-04-29 PROCEDURE — 2060000000 HC ICU INTERMEDIATE R&B

## 2024-04-29 PROCEDURE — 0W9G3ZZ DRAINAGE OF PERITONEAL CAVITY, PERCUTANEOUS APPROACH: ICD-10-PCS | Performed by: FAMILY MEDICINE

## 2024-04-29 PROCEDURE — 6360000002 HC RX W HCPCS: Performed by: UROLOGY

## 2024-04-29 PROCEDURE — 87075 CULTR BACTERIA EXCEPT BLOOD: CPT

## 2024-04-29 PROCEDURE — 87070 CULTURE OTHR SPECIMN AEROBIC: CPT

## 2024-04-29 PROCEDURE — 97535 SELF CARE MNGMENT TRAINING: CPT

## 2024-04-29 PROCEDURE — 6370000000 HC RX 637 (ALT 250 FOR IP): Performed by: INTERNAL MEDICINE

## 2024-04-29 PROCEDURE — 83605 ASSAY OF LACTIC ACID: CPT

## 2024-04-29 PROCEDURE — 83735 ASSAY OF MAGNESIUM: CPT

## 2024-04-29 PROCEDURE — 97110 THERAPEUTIC EXERCISES: CPT

## 2024-04-29 PROCEDURE — 99233 SBSQ HOSP IP/OBS HIGH 50: CPT | Performed by: FAMILY MEDICINE

## 2024-04-29 PROCEDURE — 94761 N-INVAS EAR/PLS OXIMETRY MLT: CPT

## 2024-04-29 PROCEDURE — 85025 COMPLETE CBC W/AUTO DIFF WBC: CPT

## 2024-04-29 PROCEDURE — 82947 ASSAY GLUCOSE BLOOD QUANT: CPT

## 2024-04-29 PROCEDURE — 36415 COLL VENOUS BLD VENIPUNCTURE: CPT

## 2024-04-29 PROCEDURE — 74177 CT ABD & PELVIS W/CONTRAST: CPT

## 2024-04-29 PROCEDURE — 84100 ASSAY OF PHOSPHORUS: CPT

## 2024-04-29 PROCEDURE — 2580000003 HC RX 258: Performed by: INTERNAL MEDICINE

## 2024-04-29 PROCEDURE — 6360000004 HC RX CONTRAST MEDICATION: Performed by: INTERNAL MEDICINE

## 2024-04-29 PROCEDURE — 6370000000 HC RX 637 (ALT 250 FOR IP): Performed by: NURSE PRACTITIONER

## 2024-04-29 PROCEDURE — 6360000002 HC RX W HCPCS

## 2024-04-29 PROCEDURE — 87205 SMEAR GRAM STAIN: CPT

## 2024-04-29 PROCEDURE — 87040 BLOOD CULTURE FOR BACTERIA: CPT

## 2024-04-29 PROCEDURE — 80048 BASIC METABOLIC PNL TOTAL CA: CPT

## 2024-04-29 PROCEDURE — 6370000000 HC RX 637 (ALT 250 FOR IP): Performed by: UROLOGY

## 2024-04-29 PROCEDURE — 84145 PROCALCITONIN (PCT): CPT

## 2024-04-29 RX ORDER — 0.9 % SODIUM CHLORIDE 0.9 %
80 INTRAVENOUS SOLUTION INTRAVENOUS ONCE
Status: COMPLETED | OUTPATIENT
Start: 2024-04-29 | End: 2024-04-29

## 2024-04-29 RX ORDER — SODIUM CHLORIDE 0.9 % (FLUSH) 0.9 %
10 SYRINGE (ML) INJECTION PRN
Status: DISCONTINUED | OUTPATIENT
Start: 2024-04-29 | End: 2024-05-06 | Stop reason: HOSPADM

## 2024-04-29 RX ORDER — SPIRONOLACTONE 25 MG/1
25 TABLET ORAL DAILY
Status: DISCONTINUED | OUTPATIENT
Start: 2024-04-29 | End: 2024-04-30

## 2024-04-29 RX ADMIN — RIFAXIMIN 400 MG: 200 TABLET ORAL at 21:22

## 2024-04-29 RX ADMIN — MORPHINE SULFATE 2 MG: 2 INJECTION, SOLUTION INTRAMUSCULAR; INTRAVENOUS at 14:13

## 2024-04-29 RX ADMIN — IOPAMIDOL 75 ML: 755 INJECTION, SOLUTION INTRAVENOUS at 18:18

## 2024-04-29 RX ADMIN — FOLIC ACID 1 MG: 1 TABLET ORAL at 08:59

## 2024-04-29 RX ADMIN — IOHEXOL 30 ML: 300 INJECTION, SOLUTION INTRAVENOUS at 18:18

## 2024-04-29 RX ADMIN — Medication 2000 MG: at 14:16

## 2024-04-29 RX ADMIN — SODIUM CHLORIDE, PRESERVATIVE FREE 10 ML: 5 INJECTION INTRAVENOUS at 18:18

## 2024-04-29 RX ADMIN — SODIUM CHLORIDE, PRESERVATIVE FREE 10 ML: 5 INJECTION INTRAVENOUS at 08:59

## 2024-04-29 RX ADMIN — SODIUM CHLORIDE, PRESERVATIVE FREE 10 ML: 5 INJECTION INTRAVENOUS at 21:23

## 2024-04-29 RX ADMIN — FUROSEMIDE 40 MG: 10 INJECTION, SOLUTION INTRAMUSCULAR; INTRAVENOUS at 08:59

## 2024-04-29 RX ADMIN — Medication 2000 MG: at 07:49

## 2024-04-29 RX ADMIN — SODIUM CHLORIDE 80 ML: 9 INJECTION, SOLUTION INTRAVENOUS at 18:18

## 2024-04-29 RX ADMIN — Medication 2000 MG: at 21:22

## 2024-04-29 RX ADMIN — MORPHINE SULFATE 2 MG: 2 INJECTION, SOLUTION INTRAMUSCULAR; INTRAVENOUS at 18:42

## 2024-04-29 RX ADMIN — SODIUM CHLORIDE: 9 INJECTION, SOLUTION INTRAVENOUS at 09:09

## 2024-04-29 RX ADMIN — MORPHINE SULFATE 2 MG: 2 INJECTION, SOLUTION INTRAMUSCULAR; INTRAVENOUS at 10:21

## 2024-04-29 RX ADMIN — SPIRONOLACTONE 25 MG: 25 TABLET ORAL at 12:18

## 2024-04-29 RX ADMIN — MORPHINE SULFATE 2 MG: 2 INJECTION, SOLUTION INTRAMUSCULAR; INTRAVENOUS at 23:09

## 2024-04-29 RX ADMIN — Medication 100 MG: at 08:59

## 2024-04-29 ASSESSMENT — PAIN DESCRIPTION - PAIN TYPE: TYPE: ACUTE PAIN

## 2024-04-29 ASSESSMENT — PAIN SCALES - GENERAL
PAINLEVEL_OUTOF10: 7
PAINLEVEL_OUTOF10: 4
PAINLEVEL_OUTOF10: 5
PAINLEVEL_OUTOF10: 5
PAINLEVEL_OUTOF10: 7
PAINLEVEL_OUTOF10: 0
PAINLEVEL_OUTOF10: 3
PAINLEVEL_OUTOF10: 5

## 2024-04-29 ASSESSMENT — PAIN DESCRIPTION - DESCRIPTORS: DESCRIPTORS: DISCOMFORT

## 2024-04-29 ASSESSMENT — PAIN - FUNCTIONAL ASSESSMENT
PAIN_FUNCTIONAL_ASSESSMENT: PREVENTS OR INTERFERES WITH ALL ACTIVE AND SOME PASSIVE ACTIVITIES

## 2024-04-29 ASSESSMENT — PAIN DESCRIPTION - ORIENTATION
ORIENTATION: RIGHT

## 2024-04-29 ASSESSMENT — PAIN DESCRIPTION - ONSET: ONSET: ON-GOING

## 2024-04-29 ASSESSMENT — PAIN DESCRIPTION - FREQUENCY: FREQUENCY: INTERMITTENT

## 2024-04-29 ASSESSMENT — PAIN DESCRIPTION - LOCATION
LOCATION: LEG
LOCATION: SCROTUM;LEG
LOCATION: ABDOMEN;LEG

## 2024-04-30 ENCOUNTER — APPOINTMENT (OUTPATIENT)
Dept: ULTRASOUND IMAGING | Age: 54
DRG: 871 | End: 2024-04-30
Payer: COMMERCIAL

## 2024-04-30 ENCOUNTER — APPOINTMENT (OUTPATIENT)
Dept: INTERVENTIONAL RADIOLOGY/VASCULAR | Age: 54
DRG: 871 | End: 2024-04-30
Payer: COMMERCIAL

## 2024-04-30 LAB
ANION GAP SERPL CALCULATED.3IONS-SCNC: 9 MMOL/L (ref 9–17)
BASOPHILS # BLD: 0 K/UL (ref 0–0.2)
BASOPHILS NFR BLD: 0 %
BUN SERPL-MCNC: 66 MG/DL (ref 6–20)
BUN/CREAT SERPL: 60 (ref 9–20)
CALCIUM SERPL-MCNC: 8.6 MG/DL (ref 8.6–10.4)
CHLORIDE SERPL-SCNC: 97 MMOL/L (ref 98–107)
CO2 SERPL-SCNC: 25 MMOL/L (ref 20–31)
CREAT SERPL-MCNC: 1.1 MG/DL (ref 0.7–1.2)
EOSINOPHIL # BLD: 0.81 K/UL (ref 0–0.4)
EOSINOPHILS RELATIVE PERCENT: 2 % (ref 1–4)
ERYTHROCYTE [DISTWIDTH] IN BLOOD BY AUTOMATED COUNT: 15 % (ref 11.8–14.4)
GFR SERPL CREATININE-BSD FRML MDRD: 80 ML/MIN/1.73M2
GLUCOSE SERPL-MCNC: 101 MG/DL (ref 70–99)
HCT VFR BLD AUTO: 34.5 % (ref 40.7–50.3)
HGB BLD-MCNC: 11.2 G/DL (ref 13–17)
IMM GRANULOCYTES # BLD AUTO: 2.44 K/UL (ref 0–0.3)
IMM GRANULOCYTES NFR BLD: 6 %
INR PPP: 2.8
LACTATE BLDV-SCNC: 2.2 MMOL/L (ref 0.5–1.9)
LACTATE BLDV-SCNC: 2.6 MMOL/L (ref 0.5–1.9)
LYMPHOCYTES NFR BLD: 2.03 K/UL (ref 1–4.8)
LYMPHOCYTES RELATIVE PERCENT: 5 % (ref 24–44)
MAGNESIUM SERPL-MCNC: 2 MG/DL (ref 1.6–2.6)
MCH RBC QN AUTO: 36.8 PG (ref 25.2–33.5)
MCHC RBC AUTO-ENTMCNC: 32.5 G/DL (ref 28.4–34.8)
MCV RBC AUTO: 113.5 FL (ref 82.6–102.9)
MICROORGANISM SPEC CULT: NORMAL
MONOCYTES NFR BLD: 2.44 K/UL (ref 0.2–0.8)
MONOCYTES NFR BLD: 6 % (ref 1–7)
MORPHOLOGY: ABNORMAL
NEUTROPHILS NFR BLD: 81 % (ref 36–66)
NEUTS SEG NFR BLD: 32.88 K/UL (ref 1.8–7.7)
NRBC BLD-RTO: 0 PER 100 WBC
NUCLEATED RED BLOOD CELLS: 1 PER 100 WBC
PHOSPHATE SERPL-MCNC: 3.8 MG/DL (ref 2.5–4.5)
PLATELET # BLD AUTO: 88 K/UL (ref 138–453)
PMV BLD AUTO: 10.8 FL (ref 8.1–13.5)
POTASSIUM SERPL-SCNC: 4 MMOL/L (ref 3.7–5.3)
PROTHROMBIN TIME: 29.1 SEC (ref 11.5–14.2)
RBC # BLD AUTO: 3.04 M/UL (ref 4.21–5.77)
SODIUM SERPL-SCNC: 131 MMOL/L (ref 135–144)
SPECIMEN DESCRIPTION: NORMAL
WBC OTHER # BLD: 40.6 K/UL (ref 3.5–11.3)

## 2024-04-30 PROCEDURE — 76705 ECHO EXAM OF ABDOMEN: CPT

## 2024-04-30 PROCEDURE — 85610 PROTHROMBIN TIME: CPT

## 2024-04-30 PROCEDURE — 2580000003 HC RX 258: Performed by: INTERNAL MEDICINE

## 2024-04-30 PROCEDURE — 49083 ABD PARACENTESIS W/IMAGING: CPT

## 2024-04-30 PROCEDURE — 6370000000 HC RX 637 (ALT 250 FOR IP): Performed by: FAMILY MEDICINE

## 2024-04-30 PROCEDURE — 36415 COLL VENOUS BLD VENIPUNCTURE: CPT

## 2024-04-30 PROCEDURE — 89051 BODY FLUID CELL COUNT: CPT

## 2024-04-30 PROCEDURE — 6360000002 HC RX W HCPCS: Performed by: UROLOGY

## 2024-04-30 PROCEDURE — 6370000000 HC RX 637 (ALT 250 FOR IP): Performed by: NURSE PRACTITIONER

## 2024-04-30 PROCEDURE — 2580000003 HC RX 258: Performed by: UROLOGY

## 2024-04-30 PROCEDURE — 6360000002 HC RX W HCPCS: Performed by: RADIOLOGY

## 2024-04-30 PROCEDURE — 99254 IP/OBS CNSLTJ NEW/EST MOD 60: CPT | Performed by: PHYSICAL MEDICINE & REHABILITATION

## 2024-04-30 PROCEDURE — C1729 CATH, DRAINAGE: HCPCS

## 2024-04-30 PROCEDURE — 2060000000 HC ICU INTERMEDIATE R&B

## 2024-04-30 PROCEDURE — 76870 US EXAM SCROTUM: CPT

## 2024-04-30 PROCEDURE — 6360000002 HC RX W HCPCS: Performed by: INTERNAL MEDICINE

## 2024-04-30 PROCEDURE — P9047 ALBUMIN (HUMAN), 25%, 50ML: HCPCS | Performed by: INTERNAL MEDICINE

## 2024-04-30 PROCEDURE — 6370000000 HC RX 637 (ALT 250 FOR IP): Performed by: UROLOGY

## 2024-04-30 PROCEDURE — 2700000000 HC OXYGEN THERAPY PER DAY

## 2024-04-30 PROCEDURE — 84100 ASSAY OF PHOSPHORUS: CPT

## 2024-04-30 PROCEDURE — 83605 ASSAY OF LACTIC ACID: CPT

## 2024-04-30 PROCEDURE — 99233 SBSQ HOSP IP/OBS HIGH 50: CPT | Performed by: FAMILY MEDICINE

## 2024-04-30 PROCEDURE — 80048 BASIC METABOLIC PNL TOTAL CA: CPT

## 2024-04-30 PROCEDURE — 83735 ASSAY OF MAGNESIUM: CPT

## 2024-04-30 PROCEDURE — 6360000002 HC RX W HCPCS

## 2024-04-30 PROCEDURE — 85025 COMPLETE CBC W/AUTO DIFF WBC: CPT

## 2024-04-30 PROCEDURE — P9047 ALBUMIN (HUMAN), 25%, 50ML: HCPCS | Performed by: RADIOLOGY

## 2024-04-30 PROCEDURE — 94761 N-INVAS EAR/PLS OXIMETRY MLT: CPT

## 2024-04-30 PROCEDURE — 99233 SBSQ HOSP IP/OBS HIGH 50: CPT | Performed by: INTERNAL MEDICINE

## 2024-04-30 PROCEDURE — 6370000000 HC RX 637 (ALT 250 FOR IP): Performed by: INTERNAL MEDICINE

## 2024-04-30 RX ORDER — DOCUSATE SODIUM 100 MG/1
100 CAPSULE, LIQUID FILLED ORAL 2 TIMES DAILY
Status: DISCONTINUED | OUTPATIENT
Start: 2024-04-30 | End: 2024-05-06 | Stop reason: HOSPADM

## 2024-04-30 RX ORDER — ALBUMIN (HUMAN) 12.5 G/50ML
50 SOLUTION INTRAVENOUS ONCE
Status: COMPLETED | OUTPATIENT
Start: 2024-04-30 | End: 2024-04-30

## 2024-04-30 RX ORDER — POLYETHYLENE GLYCOL 3350 17 G/17G
17 POWDER, FOR SOLUTION ORAL DAILY
Status: DISCONTINUED | OUTPATIENT
Start: 2024-04-30 | End: 2024-05-05 | Stop reason: SDUPTHER

## 2024-04-30 RX ORDER — SPIRONOLACTONE 25 MG/1
50 TABLET ORAL DAILY
Status: DISCONTINUED | OUTPATIENT
Start: 2024-05-01 | End: 2024-05-01

## 2024-04-30 RX ORDER — ALBUMIN (HUMAN) 12.5 G/50ML
25 SOLUTION INTRAVENOUS EVERY 6 HOURS
Status: COMPLETED | OUTPATIENT
Start: 2024-04-30 | End: 2024-05-02

## 2024-04-30 RX ADMIN — MORPHINE SULFATE 2 MG: 2 INJECTION, SOLUTION INTRAMUSCULAR; INTRAVENOUS at 03:17

## 2024-04-30 RX ADMIN — FOLIC ACID 1 MG: 1 TABLET ORAL at 08:45

## 2024-04-30 RX ADMIN — MORPHINE SULFATE 2 MG: 2 INJECTION, SOLUTION INTRAMUSCULAR; INTRAVENOUS at 22:28

## 2024-04-30 RX ADMIN — ALBUMIN (HUMAN) 50 G: 0.25 INJECTION, SOLUTION INTRAVENOUS at 10:20

## 2024-04-30 RX ADMIN — RIFAXIMIN 400 MG: 200 TABLET ORAL at 22:18

## 2024-04-30 RX ADMIN — POLYETHYLENE GLYCOL 3350 17 G: 17 POWDER, FOR SOLUTION ORAL at 13:32

## 2024-04-30 RX ADMIN — Medication 2000 MG: at 06:24

## 2024-04-30 RX ADMIN — SODIUM CHLORIDE, PRESERVATIVE FREE 10 ML: 5 INJECTION INTRAVENOUS at 08:45

## 2024-04-30 RX ADMIN — FUROSEMIDE 40 MG: 10 INJECTION, SOLUTION INTRAMUSCULAR; INTRAVENOUS at 08:45

## 2024-04-30 RX ADMIN — DOCUSATE SODIUM 100 MG: 100 CAPSULE, LIQUID FILLED ORAL at 13:32

## 2024-04-30 RX ADMIN — FUROSEMIDE 40 MG: 10 INJECTION, SOLUTION INTRAMUSCULAR; INTRAVENOUS at 18:44

## 2024-04-30 RX ADMIN — Medication 100 MG: at 08:45

## 2024-04-30 RX ADMIN — RIFAXIMIN 400 MG: 200 TABLET ORAL at 08:45

## 2024-04-30 RX ADMIN — MORPHINE SULFATE 2 MG: 2 INJECTION, SOLUTION INTRAMUSCULAR; INTRAVENOUS at 13:31

## 2024-04-30 RX ADMIN — DOCUSATE SODIUM 100 MG: 100 CAPSULE, LIQUID FILLED ORAL at 22:18

## 2024-04-30 RX ADMIN — RIFAXIMIN 400 MG: 200 TABLET ORAL at 13:32

## 2024-04-30 RX ADMIN — ALBUMIN (HUMAN) 25 G: 0.25 INJECTION, SOLUTION INTRAVENOUS at 23:51

## 2024-04-30 RX ADMIN — Medication 2000 MG: at 13:35

## 2024-04-30 RX ADMIN — SPIRONOLACTONE 25 MG: 25 TABLET ORAL at 08:45

## 2024-04-30 RX ADMIN — Medication 2000 MG: at 22:25

## 2024-04-30 RX ADMIN — SODIUM CHLORIDE, PRESERVATIVE FREE 10 ML: 5 INJECTION INTRAVENOUS at 22:22

## 2024-04-30 RX ADMIN — ALBUMIN (HUMAN) 25 G: 0.25 INJECTION, SOLUTION INTRAVENOUS at 17:24

## 2024-04-30 ASSESSMENT — PAIN DESCRIPTION - LOCATION
LOCATION: ABDOMEN;LEG
LOCATION: ABDOMEN;LEG
LOCATION: LEG

## 2024-04-30 ASSESSMENT — PAIN DESCRIPTION - DESCRIPTORS
DESCRIPTORS: DISCOMFORT
DESCRIPTORS: ACHING
DESCRIPTORS: DISCOMFORT

## 2024-04-30 ASSESSMENT — PAIN SCALES - GENERAL
PAINLEVEL_OUTOF10: 0
PAINLEVEL_OUTOF10: 5
PAINLEVEL_OUTOF10: 0
PAINLEVEL_OUTOF10: 8
PAINLEVEL_OUTOF10: 8
PAINLEVEL_OUTOF10: 7

## 2024-04-30 ASSESSMENT — PAIN DESCRIPTION - ORIENTATION
ORIENTATION: RIGHT
ORIENTATION: LEFT;RIGHT;MID
ORIENTATION: MID;RIGHT

## 2024-04-30 ASSESSMENT — PAIN DESCRIPTION - ONSET: ONSET: ON-GOING

## 2024-04-30 ASSESSMENT — PAIN DESCRIPTION - INTENSITY
RATING_2: 0
RATING_2: 8

## 2024-04-30 ASSESSMENT — PAIN DESCRIPTION - PAIN TYPE: TYPE: ACUTE PAIN

## 2024-04-30 ASSESSMENT — PAIN DESCRIPTION - FREQUENCY: FREQUENCY: INTERMITTENT

## 2024-04-30 ASSESSMENT — PAIN - FUNCTIONAL ASSESSMENT: PAIN_FUNCTIONAL_ASSESSMENT: ACTIVITIES ARE NOT PREVENTED

## 2024-04-30 NOTE — CONSULTS
Pulmonary Medicine and Critical Care Consult        Patient - Demarco Wilson   MRN -  [id number]   [id number]   - 1970      Date of Admission -  2024  7:08 AM  Date of evaluation -  2024  Room - [address] [place] [address]   Hospital Day - 0  Consulting - Fuentes Falk MD Primary Care Physician - Fuentes Falk MD     Reason for Consult    Hypotension, pleural effusion, pneumonia, sepsis    Assessment & Recommendations   Acute hypoxic respiratory failure  Supplemental oxygen as needed to maintain oxygen saturation >90%  Incentive spirometer q1h while awake   Currently oxygen flow rate at 3 L per    Right lower extremity cellulitis/septic shock,?  Hypovolemic component  IV fluid  Titrate Levophed for MAP 65 to 75 mmHg  Titrate Sonny-Synephrine for MAP 65 to 75 mm of  Right lower extremity negative for DVT  IV vancomycin/Zosyn    MACARIO/rhabdomyolysis?    IV fluid, rate adjusted  Obtain CPK  Monitor  right leg swelling  Bicarb 2 ampoules IV x 1  Bicarb drip  Consult Dr. Elliott    Right pleural effusion  Right thoracentesis 24  Follow up fluid studies and cytology  Repeat x-ray chest in a.m.    Ascites/ETOH abuse/hyperbilirubinemia  Thiamine and folic acid    DVT prophylaxis with EPC cuffs  Will follow with you    HPI     Demarco Wilson is 53 y.o.,  male, admitted because of cellulitis.  Patient dropped a log on his right foot almost a week or so ago.  He presented to emergency room for increasing swelling and pain of the right foot and leg.  He also has chronic cough going on for many months.  He denies any chest pain.  He gets shortness of breath spells with the coughing.  He also had low-grade fever.  He was hospitalized few months ago for left leg cellulitis.  He is not a smoker.  He has quit drinking alcohol    PMHx   Past Medical History      Diagnosis Date    Medial meniscus tear     MRSA bacteremia     left knee    Pneumonia       Past Surgical History        Procedure Laterality Date    KNEE 
Pharmacy dosing of initial vancomycin ordered by ED provider.    Wt Readings from Last 1 Encounters:   04/22/24 136.1 kg (300 lb)       2500 mg ordered x 1.    Pharmacy is waiting to see if vancomycin therapy is continued or stopped/changed by the admitting physician.   
Reason for Consult:  Acute kidney injury.    Requesting Physician:  Fuentes Falk MD    HISTORY OF PRESENT ILLNESS:    The patient is a 53 y.o. male who has no history of CKD, presents with shortness of breath, R leg redness and swelling after an accident on Saturday 4/13/24. On previous labs from 2023 his serum creatinines have been between 0.5 to 0.6 with eGFR of >60s ml/min. On admission hisher creatinine was elevated at 2.7 that has risen to 3.0 today. Denies any problems with nausea, vomiting, appetite, diarrhea. Denies any recent use of NSAIDs or iv contrast.  His lowest recorded BP was 68/47 mmHg on admission.  He has history of alcohol abuse. He had a CT abdomen and pelvis without iv contrast that showed cirrhosis with ascites, anasarca and splenomegaly.   He also had urinary retention with failed Lawler catheter placement. He underwent cystoscopy with urethral dilation on 4/23/24.     Review Of Systems:   Constitutional: No fever, chills, lethargy, weakness or wt loss.  HEENT:  No headache, nasal discharge or sore throat.  Cardiac:  No chest pain, dyspnea, orthopnea or PND.  Chest:              No cough, phlegm or wheezing.  Abdomen:  No abdominal pain, nausea, vomiting or diarrhea.  Neuro:  No gross focal weakness, numbness, abnormal movements or seizure like activity.  Skin:   No rashes or itching.  :   No hematuria, pyuria, dysuria or flank pain.  Extremities:  Co swelling and redness R leg, no joint pains.  Endocrine: No polyuria, polydypsia, or thyroid problems.  Hematology:    No bleeding disorders, bruising or anemia.  All other ROS is negative.      Past Medical History:   Diagnosis Date    Medial meniscus tear     MRSA bacteremia     left knee    Pneumonia        Past Surgical History:   Procedure Laterality Date    CYSTOSCOPY N/A 4/23/2024    CYSTOSCOPY URETHRAL DILATATION WITH LAWLER PLACEMENT performed by Eliud Cunningham MD at [facility] OR    KNEE SURGERY Right     KNEE SURGERY Left 9/29/2023    
  BP (!) 117/50   Pulse (!) 103   Temp 97.8 °F (36.6 °C) (Oral)   Resp 27   Ht 1.88 m (6' 2\")   Wt (!) 140.9 kg (310 lb 10.1 oz)   SpO2 92%   BMI 39.88 kg/m²     General:  Alert and oriented x 3.  No acute distress.,no asterixis    HEENT:  Oropharynx moist and pink without thrush, +muscle wasting    Chest: diminished bases    Cardiovascular: Heart sounds were normal with a regular rate and rhythm.  There were no murmurs or gallops, PMI nondisplaced.     Abdomen: obese, ascites, ttp LLQ, present bs    Skin/Musculoskeltal/Ext:  + jaundice. +++ edema, scrotal swelling. Gait not assessed.  + liver stigmata.      Lab and Imaging Review   Recent Labs     04/27/24  0359 04/28/24  0325 04/29/24  0704   WBC 16.7* 20.6* 32.3*   HGB 11.1* 11.0* 11.5*   .6* 111.8* 109.6*   PLT 58* 63* 83*   INR 2.9  --   --     134* 134*   K 3.9 3.7 3.9   CL 99 97* 96*   CO2 31 29 29   BUN 54* 54* 58*   CREATININE 0.9 0.8 0.9   GLUCOSE 102* 103* 111*   CALCIUM 8.7 8.5* 8.7   MG 1.9 1.8 1.9     Recent Labs     04/27/24  0359   INR 2.9   PROTIME 29.7*           IMPRESSION:  52 yo male with decompensated cirrhosis from etoh sober since Sept 2023. Admitted due to RLE pain, changes of skin found to have cellulitis following a traumatic foot injury, fevers, acute hypoxic respiratory insufficiency on supplemental O2, MACARIO on CKD, right sided pleural effusion, s/p thoracentesis on 4/22 with 950 cc removed. Has etoh cirrhosis sober since September. Has followed Dr. Caraballo in the office. Recent septic left knee prepatellar bursitis secondary to methicillin-resistant Staphylococcus aureus, Patient underwent left knee prepatellar irrigation and debridement with bursectomy 9/29/2023, completed a course of linezolid.   He is continued on IV cefazolin for e. Coli in blood, ID is following, as is Pulmonary as is Nephrology.     He has ascites, anasarca, edema to thigh/scrotum. Minimal to no asterixis. No overt blood loss. 
(!) 140.9 kg (310 lb 10.1 oz)   04/22/24 1250 (!) 89/56 -- -- (!) 104 21 92 % -- --   04/22/24 1235 (!) 83/48 -- -- (!) 103 26 94 % -- --   04/22/24 1215 (!) 68/54 -- -- (!) 107 24 92 % -- --   04/22/24 1151 -- -- -- (!) 105 (!) 32 97 % -- --   04/22/24 1133 (!) 90/54 -- -- 100 29 97 % -- --   04/22/24 0904 -- -- -- -- -- -- -- 136.1 kg (300 lb)   04/22/24 0830 106/67 -- -- -- -- -- -- --   04/22/24 0815 95/78 -- -- -- -- -- -- --   04/22/24 0722 (!) 74/40 -- -- -- -- -- -- --   04/22/24 0705 (!) 68/47 99.3 °F (37.4 °C) Temporal (!) 106 18 97 % -- --     Constitutional: Patient in no acute distress;   Neuro: alert and oriented to person place and time.    Psych: Mood and affect normal.  Skin: Normal  Lungs: Respiratory effort normal  Cardiovascular:  Normal peripheral pulses  Abdomen: Soft, non-tender, non-distended with no CVA, flank pain, hepatosplenomegaly or hernia.  Kidneys normal.  Bladder non-tender and not distended.  Lymphatics: no palpable lymphadenopathy  Penis normal and circumcised  Urethral meatus normal  Scrotal exam normal  Testicles normal bilaterally  Epididymis normal bilaterally  No evidence of inguinal hernia  Anus and perineum normal  Normal rectal tone with no masses  Prostate soft, non-tender to palpation.  No palpable nodules.  Estimated 40 grams.   LABS:  Recent Labs     04/22/24  0815 04/23/24  0336   WBC 21.7* 42.1*   HGB 12.0* 11.5*   HCT 37.6* 35.8*   .0* 118.2*   * 196     Recent Labs     04/22/24  0815 04/22/24  1341   * 131*   K 5.8* 5.1   CL 99 100   CO2 18* 14*   BUN 34* 36*   CREATININE 2.7* 3.0*     No results found for: \"PSA\"    Additional Lab/culture results:    Urinalysis: No results for input(s): \"COLORU\", \"PHUR\", \"LABCAST\", \"WBCUA\", \"RBCUA\", \"MUCUS\", \"TRICHOMONAS\", \"YEAST\", \"BACTERIA\", \"CLARITYU\", \"SPECGRAV\", \"LEUKOCYTESUR\", \"UROBILINOGEN\", \"BILIRUBINUR\", \"BLOODU\" in the last 72 hours.    Invalid input(s): \"NITRATE\", \"GLUCOSEUKETONESUAMORPHOUS\" 
  Culture, Blood 2 [[id number]] (Abnormal) Collected: 04/22/24 0725   Order Status: Completed Specimen: Blood Updated: 04/22/24 1944    Specimen Description .BLOOD    Special Requests RIGHT AC,8ML,LR    Culture POSITIVE Blood Culture Abnormal      DIRECT GRAM STAIN FROM BOTTLE: GRAM NEGATIVE RODS     Escherichia coli Detected: Methodology- Polymerase Chain Reaction (PCR)     (NOTE) Direct Gram Stain from bottle and Polymerase Chain Reaction (PCR) results called to and read back by:JACQUES HYDE AT 1943 ON 4/22/24       Electronically signed by SLOANE SAXENA CNP on 4/23/2024 at 9:49 AM      [facility]  SLOANE SAXENA CNP  [employer] messaging  OFFICE: [phone]    Thank you for allowing us to participate in the care of this patient. Please call with questions.     This note is created with the assistance of a speech recognition program.  While intending to generate a document that actually reflects the content of the visit, the document can still have some errors including those of syntax and sound a like substitutions which may escape proof reading.  In such instances, actual meaning can be extrapolated by contextual diversion.    
speech recognition program.  While intending to generate a document that actually reflects the content of the visit, the document can still have some errors including those of syntax and sound a like substitutions which may escape proof reading.  In such instances, actual meaning can be extrapolated by contextual diversion.

## 2024-04-30 NOTE — DISCHARGE INSTR - COC
Continuity of Care Form    Patient Name: Demarco Wilson   :  1970  MRN:  [id number]    Admit date:  2024  Discharge date:  ***    Code Status Order: Full Code   Advance Directives:     Admitting Physician:  Fuentes Falk MD  PCP: Fuentes Falk MD    Discharging Nurse: ***  Discharging Hospital Unit/Room#: 1108/1108-01  Discharging Unit Phone Number: ***    Emergency Contact:   Extended Emergency Contact Information  Primary Emergency Contact: Saskia Adams  Home Phone: [phone]  Relation: Girlfriend  Preferred language: English   needed? No    Past Surgical History:  Past Surgical History:   Procedure Laterality Date    CYSTOSCOPY N/A 2024    CYSTOSCOPY URETHRAL DILATATION WITH LAWLER PLACEMENT performed by Eliud Cunningham MD at [facility] OR    KNEE SURGERY Right     KNEE SURGERY Left 2023    1.Left knee prepatellar irrigation and debridement with bursectomy performed by Wisam Villeda MD at [facility] OR    WISDOM TOOTH EXTRACTION         Immunization History:   Immunization History   Administered Date(s) Administered    COVID-19, PFIZER PURPLE top, DILUTE for use, (age 12 y+), 30mcg/0.3mL 2021    Influenza, FLUCELVAX, (age 6 mo+), MDCK, PF, 0.5mL 2023    TDaP, ADACEL (age 10y-64y), BOOSTRIX (age 10y+), IM, 0.5mL 2023       Active Problems:  Patient Active Problem List   Diagnosis Code    Class 3 severe obesity due to excess calories without serious comorbidity in adult (HCC) E66.01    Abnormal liver function test R79.89    Patellar bursitis of left knee M70.52    Benign essential HTN I10    Alcoholic cirrhosis of liver with ascites (HCC) K70.31    Splenomegaly R16.1    Suspected sleep apnea R29.818    Pneumonia due to infectious organism J18.9    Cellulitis of right lower extremity L03.115    Bacteremia R78.81    Sepsis associated hypotension (HCC) A41.9, I95.9       Isolation/Infection:   Isolation            Contact          Patient Infection Status       Infection

## 2024-04-30 NOTE — FLOWSHEET NOTE
Pt tolerated procedure without distress.5.3 liters of clear yellow ascitic fluid removed specimen collected for labs  Dressing applied to procedure site. Pt returned to room in nad

## 2024-04-30 NOTE — BRIEF OP NOTE
Brief Postoperative Note    Demarco Wilson  YOB: 1970  [id number]    Pre-operative Diagnosis: ascites; abdominal discomfort    Post-operative Diagnosis: Same    Procedure: US guided paracentesis     Anesthesia: Local    Surgeons/Assistants: Stefan    Estimated Blood Loss: less than 50     Complications: None    Specimens: Was Obtained: non turbid bright yellow ascites    Electronically signed by Clifford Aviles MD on 4/30/2024 at 12:07 PM

## 2024-05-01 PROBLEM — A41.51 SEPSIS DUE TO ESCHERICHIA COLI WITHOUT ACUTE ORGAN DYSFUNCTION (HCC): Status: ACTIVE | Noted: 2024-05-01

## 2024-05-01 LAB
ANION GAP SERPL CALCULATED.3IONS-SCNC: 7 MMOL/L (ref 9–17)
BASOPHILS # BLD: 0 K/UL (ref 0–0.2)
BASOPHILS NFR BLD: 0 % (ref 0–2)
BODY FLD TYPE: NORMAL
BUN SERPL-MCNC: 77 MG/DL (ref 6–20)
BUN/CREAT SERPL: 64 (ref 9–20)
CALCIUM SERPL-MCNC: 8.7 MG/DL (ref 8.6–10.4)
CHLORIDE SERPL-SCNC: 95 MMOL/L (ref 98–107)
CO2 SERPL-SCNC: 28 MMOL/L (ref 20–31)
CREAT SERPL-MCNC: 1.2 MG/DL (ref 0.7–1.2)
EOSINOPHIL # BLD: 0.74 K/UL (ref 0–0.44)
EOSINOPHILS RELATIVE PERCENT: 2 % (ref 1–4)
ERYTHROCYTE [DISTWIDTH] IN BLOOD BY AUTOMATED COUNT: 14.9 % (ref 11.8–14.4)
GFR, ESTIMATED: 72 ML/MIN/1.73M2
GLUCOSE SERPL-MCNC: 112 MG/DL (ref 70–99)
HCT VFR BLD AUTO: 30.1 % (ref 40.7–50.3)
HGB BLD-MCNC: 10.2 G/DL (ref 13–17)
IMM GRANULOCYTES # BLD AUTO: 1.11 K/UL (ref 0–0.3)
IMM GRANULOCYTES NFR BLD: 3 %
LYMPHOCYTES NFR BLD: 1.48 K/UL (ref 1.1–3.7)
LYMPHOCYTES NFR FLD: 14 %
LYMPHOCYTES RELATIVE PERCENT: 4 % (ref 24–43)
MAGNESIUM SERPL-MCNC: 2.2 MG/DL (ref 1.6–2.6)
MCH RBC QN AUTO: 37.1 PG (ref 25.2–33.5)
MCHC RBC AUTO-ENTMCNC: 33.9 G/DL (ref 28.4–34.8)
MCV RBC AUTO: 109.5 FL (ref 82.6–102.9)
MONOCYTES NFR BLD: 2.58 K/UL (ref 0.1–1.2)
MONOCYTES NFR BLD: 7 % (ref 3–12)
MORPHOLOGY: ABNORMAL
NEUTROPHILS NFR BLD: 84 % (ref 36–65)
NEUTROPHILS NFR FLD: 51 %
NEUTS SEG NFR BLD: 30.99 K/UL (ref 1.5–8.1)
NRBC BLD-RTO: 0 PER 100 WBC
PHOSPHATE SERPL-MCNC: 3.7 MG/DL (ref 2.5–4.5)
PLATELET # BLD AUTO: 88 K/UL (ref 138–453)
PMV BLD AUTO: 11.4 FL (ref 8.1–13.5)
POTASSIUM SERPL-SCNC: 3.7 MMOL/L (ref 3.7–5.3)
RBC # BLD AUTO: 2.75 M/UL (ref 4.21–5.77)
RBC # FLD: <3000 CELLS/UL
SODIUM SERPL-SCNC: 130 MMOL/L (ref 135–144)
UNIDENT CELLS NFR FLD: NORMAL %
WBC OTHER # BLD: 36.9 K/UL (ref 3.5–11.3)

## 2024-05-01 PROCEDURE — 97535 SELF CARE MNGMENT TRAINING: CPT

## 2024-05-01 PROCEDURE — 2580000003 HC RX 258

## 2024-05-01 PROCEDURE — 6360000002 HC RX W HCPCS

## 2024-05-01 PROCEDURE — 6360000002 HC RX W HCPCS: Performed by: INTERNAL MEDICINE

## 2024-05-01 PROCEDURE — P9047 ALBUMIN (HUMAN), 25%, 50ML: HCPCS | Performed by: INTERNAL MEDICINE

## 2024-05-01 PROCEDURE — 97530 THERAPEUTIC ACTIVITIES: CPT

## 2024-05-01 PROCEDURE — 2700000000 HC OXYGEN THERAPY PER DAY

## 2024-05-01 PROCEDURE — 6370000000 HC RX 637 (ALT 250 FOR IP): Performed by: SPECIALIST

## 2024-05-01 PROCEDURE — 2060000000 HC ICU INTERMEDIATE R&B

## 2024-05-01 PROCEDURE — 85025 COMPLETE CBC W/AUTO DIFF WBC: CPT

## 2024-05-01 PROCEDURE — 2580000003 HC RX 258: Performed by: INTERNAL MEDICINE

## 2024-05-01 PROCEDURE — 6370000000 HC RX 637 (ALT 250 FOR IP): Performed by: NURSE PRACTITIONER

## 2024-05-01 PROCEDURE — 6360000002 HC RX W HCPCS: Performed by: UROLOGY

## 2024-05-01 PROCEDURE — 99233 SBSQ HOSP IP/OBS HIGH 50: CPT | Performed by: FAMILY MEDICINE

## 2024-05-01 PROCEDURE — 36415 COLL VENOUS BLD VENIPUNCTURE: CPT

## 2024-05-01 PROCEDURE — 6370000000 HC RX 637 (ALT 250 FOR IP): Performed by: INTERNAL MEDICINE

## 2024-05-01 PROCEDURE — 80048 BASIC METABOLIC PNL TOTAL CA: CPT

## 2024-05-01 PROCEDURE — 6370000000 HC RX 637 (ALT 250 FOR IP): Performed by: UROLOGY

## 2024-05-01 PROCEDURE — 99232 SBSQ HOSP IP/OBS MODERATE 35: CPT | Performed by: INTERNAL MEDICINE

## 2024-05-01 PROCEDURE — 2580000003 HC RX 258: Performed by: UROLOGY

## 2024-05-01 PROCEDURE — 94761 N-INVAS EAR/PLS OXIMETRY MLT: CPT

## 2024-05-01 PROCEDURE — 84100 ASSAY OF PHOSPHORUS: CPT

## 2024-05-01 PROCEDURE — 6370000000 HC RX 637 (ALT 250 FOR IP): Performed by: FAMILY MEDICINE

## 2024-05-01 PROCEDURE — 83735 ASSAY OF MAGNESIUM: CPT

## 2024-05-01 RX ORDER — SPIRONOLACTONE 25 MG/1
50 TABLET ORAL 2 TIMES DAILY
Status: DISCONTINUED | OUTPATIENT
Start: 2024-05-01 | End: 2024-05-02

## 2024-05-01 RX ORDER — FUROSEMIDE 10 MG/ML
10 INJECTION INTRAMUSCULAR; INTRAVENOUS CONTINUOUS
Status: DISCONTINUED | OUTPATIENT
Start: 2024-05-01 | End: 2024-05-01

## 2024-05-01 RX ORDER — FUROSEMIDE 10 MG/ML
40 INJECTION INTRAMUSCULAR; INTRAVENOUS ONCE
Status: COMPLETED | OUTPATIENT
Start: 2024-05-01 | End: 2024-05-01

## 2024-05-01 RX ADMIN — Medication 2000 MG: at 22:21

## 2024-05-01 RX ADMIN — DOCUSATE SODIUM 100 MG: 100 CAPSULE, LIQUID FILLED ORAL at 08:01

## 2024-05-01 RX ADMIN — Medication 2000 MG: at 06:19

## 2024-05-01 RX ADMIN — RIFAXIMIN 400 MG: 200 TABLET ORAL at 08:00

## 2024-05-01 RX ADMIN — FUROSEMIDE 40 MG: 10 INJECTION, SOLUTION INTRAMUSCULAR; INTRAVENOUS at 17:52

## 2024-05-01 RX ADMIN — SPIRONOLACTONE 50 MG: 25 TABLET ORAL at 08:00

## 2024-05-01 RX ADMIN — ALBUMIN (HUMAN) 25 G: 0.25 INJECTION, SOLUTION INTRAVENOUS at 16:16

## 2024-05-01 RX ADMIN — FOLIC ACID 1 MG: 1 TABLET ORAL at 08:00

## 2024-05-01 RX ADMIN — FUROSEMIDE 10 MG/HR: 10 INJECTION, SOLUTION INTRAMUSCULAR; INTRAVENOUS at 17:56

## 2024-05-01 RX ADMIN — RIFAXIMIN 400 MG: 200 TABLET ORAL at 13:53

## 2024-05-01 RX ADMIN — ALBUMIN (HUMAN) 25 G: 0.25 INJECTION, SOLUTION INTRAVENOUS at 04:38

## 2024-05-01 RX ADMIN — SPIRONOLACTONE 50 MG: 25 TABLET ORAL at 21:08

## 2024-05-01 RX ADMIN — RIFAXIMIN 400 MG: 200 TABLET ORAL at 21:08

## 2024-05-01 RX ADMIN — ALBUMIN (HUMAN) 25 G: 0.25 INJECTION, SOLUTION INTRAVENOUS at 10:14

## 2024-05-01 RX ADMIN — SODIUM CHLORIDE, PRESERVATIVE FREE 10 ML: 5 INJECTION INTRAVENOUS at 21:11

## 2024-05-01 RX ADMIN — MORPHINE SULFATE 2 MG: 2 INJECTION, SOLUTION INTRAMUSCULAR; INTRAVENOUS at 02:56

## 2024-05-01 RX ADMIN — SODIUM CHLORIDE: 9 INJECTION, SOLUTION INTRAVENOUS at 22:21

## 2024-05-01 RX ADMIN — FUROSEMIDE 40 MG: 10 INJECTION, SOLUTION INTRAMUSCULAR; INTRAVENOUS at 11:26

## 2024-05-01 RX ADMIN — POLYETHYLENE GLYCOL 3350 17 G: 17 POWDER, FOR SOLUTION ORAL at 08:01

## 2024-05-01 RX ADMIN — Medication 100 MG: at 08:00

## 2024-05-01 RX ADMIN — MORPHINE SULFATE 2 MG: 2 INJECTION, SOLUTION INTRAMUSCULAR; INTRAVENOUS at 08:05

## 2024-05-01 RX ADMIN — MORPHINE SULFATE 2 MG: 2 INJECTION, SOLUTION INTRAMUSCULAR; INTRAVENOUS at 11:26

## 2024-05-01 RX ADMIN — MORPHINE SULFATE 2 MG: 2 INJECTION, SOLUTION INTRAMUSCULAR; INTRAVENOUS at 16:23

## 2024-05-01 RX ADMIN — ALBUMIN (HUMAN) 25 G: 0.25 INJECTION, SOLUTION INTRAVENOUS at 23:15

## 2024-05-01 RX ADMIN — Medication 2000 MG: at 13:55

## 2024-05-01 RX ADMIN — DOCUSATE SODIUM 100 MG: 100 CAPSULE, LIQUID FILLED ORAL at 21:08

## 2024-05-01 RX ADMIN — MORPHINE SULFATE 2 MG: 2 INJECTION, SOLUTION INTRAMUSCULAR; INTRAVENOUS at 19:24

## 2024-05-01 ASSESSMENT — PAIN DESCRIPTION - ORIENTATION
ORIENTATION_2: RIGHT
ORIENTATION: LOWER
ORIENTATION: LOWER
ORIENTATION_2: RIGHT
ORIENTATION: LOWER
ORIENTATION: LOWER

## 2024-05-01 ASSESSMENT — PAIN DESCRIPTION - INTENSITY
RATING_2: 7
RATING_2: 0
RATING_2: 8

## 2024-05-01 ASSESSMENT — PAIN SCALES - GENERAL
PAINLEVEL_OUTOF10: 8
PAINLEVEL_OUTOF10: 0
PAINLEVEL_OUTOF10: 7
PAINLEVEL_OUTOF10: 7
PAINLEVEL_OUTOF10: 0
PAINLEVEL_OUTOF10: 8
PAINLEVEL_OUTOF10: 0
PAINLEVEL_OUTOF10: 8
PAINLEVEL_OUTOF10: 6
PAINLEVEL_OUTOF10: 0
PAINLEVEL_OUTOF10: 0

## 2024-05-01 ASSESSMENT — PAIN DESCRIPTION - PAIN TYPE
TYPE: ACUTE PAIN

## 2024-05-01 ASSESSMENT — PAIN DESCRIPTION - DESCRIPTORS
DESCRIPTORS_2: ACHING
DESCRIPTORS: ACHING
DESCRIPTORS_2: ACHING
DESCRIPTORS_2: ACHING
DESCRIPTORS: ACHING
DESCRIPTORS_2: ACHING

## 2024-05-01 ASSESSMENT — PAIN DESCRIPTION - LOCATION
LOCATION: ABDOMEN
LOCATION_2: LEG
LOCATION: LEG;ABDOMEN
LOCATION_2: LEG
LOCATION_2: LEG
LOCATION: ABDOMEN
LOCATION_2: LEG

## 2024-05-01 ASSESSMENT — PAIN - FUNCTIONAL ASSESSMENT
PAIN_FUNCTIONAL_ASSESSMENT_SITE2: PREVENTS OR INTERFERES WITH ALL ACTIVE AND SOME PASSIVE ACTIVITIES
PAIN_FUNCTIONAL_ASSESSMENT: PREVENTS OR INTERFERES WITH ALL ACTIVE AND SOME PASSIVE ACTIVITIES
PAIN_FUNCTIONAL_ASSESSMENT_SITE2: PREVENTS OR INTERFERES WITH ALL ACTIVE AND SOME PASSIVE ACTIVITIES

## 2024-05-01 ASSESSMENT — PAIN DESCRIPTION - FREQUENCY
FREQUENCY: INTERMITTENT

## 2024-05-01 ASSESSMENT — PAIN DESCRIPTION - ONSET
ONSET: ON-GOING

## 2024-05-01 ASSESSMENT — PAIN DESCRIPTION - DIRECTION
RADIATING_TOWARDS: RIGHT LEG

## 2024-05-02 PROBLEM — L97.212 NON-PRESSURE CHRONIC ULCER OF RIGHT CALF WITH FAT LAYER EXPOSED (HCC): Status: ACTIVE | Noted: 2024-05-02

## 2024-05-02 PROBLEM — L97.512 RIGHT FOOT ULCER, WITH FAT LAYER EXPOSED (HCC): Status: ACTIVE | Noted: 2024-05-02

## 2024-05-02 LAB
ANION GAP SERPL CALCULATED.3IONS-SCNC: 10 MMOL/L (ref 9–17)
BASOPHILS # BLD: 0.37 K/UL (ref 0–0.2)
BASOPHILS NFR BLD: 1 %
BILIRUB UR QL STRIP: ABNORMAL
BUN SERPL-MCNC: 85 MG/DL (ref 6–20)
BUN/CREAT SERPL: 61 (ref 9–20)
CALCIUM SERPL-MCNC: 9.2 MG/DL (ref 8.6–10.4)
CHLORIDE SERPL-SCNC: 92 MMOL/L (ref 98–107)
CLARITY UR: CLEAR
CO2 SERPL-SCNC: 26 MMOL/L (ref 20–31)
COLOR UR: YELLOW
CREAT SERPL-MCNC: 1.4 MG/DL (ref 0.7–1.2)
CREAT UR-MCNC: 129 MG/DL (ref 39–259)
EOSINOPHIL # BLD: 0.75 K/UL (ref 0–0.4)
EOSINOPHILS RELATIVE PERCENT: 2 % (ref 1–4)
EPI CELLS #/AREA URNS HPF: ABNORMAL /HPF (ref 0–5)
ERYTHROCYTE [DISTWIDTH] IN BLOOD BY AUTOMATED COUNT: 14.9 % (ref 11.8–14.4)
GFR, ESTIMATED: 60 ML/MIN/1.73M2
GLUCOSE SERPL-MCNC: 99 MG/DL (ref 70–99)
GLUCOSE UR STRIP-MCNC: NEGATIVE MG/DL
HCT VFR BLD AUTO: 29.7 % (ref 40.7–50.3)
HGB BLD-MCNC: 10 G/DL (ref 13–17)
HGB UR QL STRIP.AUTO: ABNORMAL
IMM GRANULOCYTES # BLD AUTO: 1.12 K/UL (ref 0–0.3)
IMM GRANULOCYTES NFR BLD: 3 %
KETONES UR STRIP-MCNC: ABNORMAL MG/DL
LEUKOCYTE ESTERASE UR QL STRIP: ABNORMAL
LYMPHOCYTES NFR BLD: 1.5 K/UL (ref 1–4.8)
LYMPHOCYTES RELATIVE PERCENT: 4 % (ref 24–44)
MAGNESIUM SERPL-MCNC: 2.3 MG/DL (ref 1.6–2.6)
MCH RBC QN AUTO: 37.2 PG (ref 25.2–33.5)
MCHC RBC AUTO-ENTMCNC: 33.7 G/DL (ref 28.4–34.8)
MCV RBC AUTO: 110.4 FL (ref 82.6–102.9)
MONOCYTES NFR BLD: 2.99 K/UL (ref 0.2–0.8)
MONOCYTES NFR BLD: 8 % (ref 1–7)
MORPHOLOGY: ABNORMAL
MUCOUS THREADS URNS QL MICRO: ABNORMAL
NEUTROPHILS NFR BLD: 82 % (ref 36–66)
NEUTS SEG NFR BLD: 30.67 K/UL (ref 1.8–7.7)
NITRITE UR QL STRIP: NEGATIVE
NRBC BLD-RTO: 0 PER 100 WBC
PH UR STRIP: 5.5 [PH] (ref 5–8)
PHOSPHATE SERPL-MCNC: 4.6 MG/DL (ref 2.5–4.5)
PLATELET # BLD AUTO: 96 K/UL (ref 138–453)
PMV BLD AUTO: 11.3 FL (ref 8.1–13.5)
POTASSIUM SERPL-SCNC: 3.9 MMOL/L (ref 3.7–5.3)
PROT UR STRIP-MCNC: ABNORMAL MG/DL
RBC # BLD AUTO: 2.69 M/UL (ref 4.21–5.77)
RBC #/AREA URNS HPF: ABNORMAL /HPF (ref 0–2)
SODIUM SERPL-SCNC: 128 MMOL/L (ref 135–144)
SODIUM UR-SCNC: <20 MMOL/L
SP GR UR STRIP: 1.02 (ref 1–1.03)
UROBILINOGEN UR STRIP-ACNC: NORMAL EU/DL (ref 0–1)
WBC #/AREA URNS HPF: ABNORMAL /HPF (ref 0–5)
WBC OTHER # BLD: 37.4 K/UL (ref 3.5–11.3)

## 2024-05-02 PROCEDURE — 99232 SBSQ HOSP IP/OBS MODERATE 35: CPT | Performed by: NURSE PRACTITIONER

## 2024-05-02 PROCEDURE — 81001 URINALYSIS AUTO W/SCOPE: CPT

## 2024-05-02 PROCEDURE — 2580000003 HC RX 258: Performed by: INTERNAL MEDICINE

## 2024-05-02 PROCEDURE — 6370000000 HC RX 637 (ALT 250 FOR IP): Performed by: FAMILY MEDICINE

## 2024-05-02 PROCEDURE — 36415 COLL VENOUS BLD VENIPUNCTURE: CPT

## 2024-05-02 PROCEDURE — 85025 COMPLETE CBC W/AUTO DIFF WBC: CPT

## 2024-05-02 PROCEDURE — 6360000002 HC RX W HCPCS: Performed by: UROLOGY

## 2024-05-02 PROCEDURE — 97530 THERAPEUTIC ACTIVITIES: CPT

## 2024-05-02 PROCEDURE — 6360000002 HC RX W HCPCS: Performed by: FAMILY MEDICINE

## 2024-05-02 PROCEDURE — 84300 ASSAY OF URINE SODIUM: CPT

## 2024-05-02 PROCEDURE — 6370000000 HC RX 637 (ALT 250 FOR IP): Performed by: UROLOGY

## 2024-05-02 PROCEDURE — 6370000000 HC RX 637 (ALT 250 FOR IP): Performed by: INTERNAL MEDICINE

## 2024-05-02 PROCEDURE — 97110 THERAPEUTIC EXERCISES: CPT

## 2024-05-02 PROCEDURE — 2580000003 HC RX 258: Performed by: UROLOGY

## 2024-05-02 PROCEDURE — 82570 ASSAY OF URINE CREATININE: CPT

## 2024-05-02 PROCEDURE — 2580000003 HC RX 258

## 2024-05-02 PROCEDURE — 99222 1ST HOSP IP/OBS MODERATE 55: CPT

## 2024-05-02 PROCEDURE — 6370000000 HC RX 637 (ALT 250 FOR IP): Performed by: NURSE PRACTITIONER

## 2024-05-02 PROCEDURE — 6360000002 HC RX W HCPCS: Performed by: INTERNAL MEDICINE

## 2024-05-02 PROCEDURE — 6360000002 HC RX W HCPCS

## 2024-05-02 PROCEDURE — 83735 ASSAY OF MAGNESIUM: CPT

## 2024-05-02 PROCEDURE — 80048 BASIC METABOLIC PNL TOTAL CA: CPT

## 2024-05-02 PROCEDURE — 2060000000 HC ICU INTERMEDIATE R&B

## 2024-05-02 PROCEDURE — 99233 SBSQ HOSP IP/OBS HIGH 50: CPT | Performed by: FAMILY MEDICINE

## 2024-05-02 PROCEDURE — P9047 ALBUMIN (HUMAN), 25%, 50ML: HCPCS | Performed by: INTERNAL MEDICINE

## 2024-05-02 PROCEDURE — 84100 ASSAY OF PHOSPHORUS: CPT

## 2024-05-02 RX ORDER — SPIRONOLACTONE 25 MG/1
100 TABLET ORAL 2 TIMES DAILY
Status: DISCONTINUED | OUTPATIENT
Start: 2024-05-02 | End: 2024-05-03

## 2024-05-02 RX ORDER — MORPHINE SULFATE 4 MG/ML
4 INJECTION, SOLUTION INTRAMUSCULAR; INTRAVENOUS
Status: DISCONTINUED | OUTPATIENT
Start: 2024-05-02 | End: 2024-05-06 | Stop reason: HOSPADM

## 2024-05-02 RX ORDER — ALBUMIN (HUMAN) 12.5 G/50ML
25 SOLUTION INTRAVENOUS EVERY 6 HOURS
Status: DISPENSED | OUTPATIENT
Start: 2024-05-02 | End: 2024-05-04

## 2024-05-02 RX ADMIN — ALBUMIN (HUMAN) 25 G: 0.25 INJECTION, SOLUTION INTRAVENOUS at 11:39

## 2024-05-02 RX ADMIN — SODIUM CHLORIDE, PRESERVATIVE FREE 10 ML: 5 INJECTION INTRAVENOUS at 22:21

## 2024-05-02 RX ADMIN — FUROSEMIDE 10 MG/HR: 10 INJECTION, SOLUTION INTRAMUSCULAR; INTRAVENOUS at 02:04

## 2024-05-02 RX ADMIN — ALBUMIN (HUMAN) 25 G: 0.25 INJECTION, SOLUTION INTRAVENOUS at 23:28

## 2024-05-02 RX ADMIN — MORPHINE SULFATE 2 MG: 2 INJECTION, SOLUTION INTRAMUSCULAR; INTRAVENOUS at 02:08

## 2024-05-02 RX ADMIN — DOCUSATE SODIUM 100 MG: 100 CAPSULE, LIQUID FILLED ORAL at 11:06

## 2024-05-02 RX ADMIN — Medication 100 MG: at 11:06

## 2024-05-02 RX ADMIN — MORPHINE SULFATE 2 MG: 2 INJECTION, SOLUTION INTRAMUSCULAR; INTRAVENOUS at 17:50

## 2024-05-02 RX ADMIN — FOLIC ACID 1 MG: 1 TABLET ORAL at 11:06

## 2024-05-02 RX ADMIN — Medication 2000 MG: at 14:36

## 2024-05-02 RX ADMIN — SPIRONOLACTONE 100 MG: 25 TABLET ORAL at 17:29

## 2024-05-02 RX ADMIN — FUROSEMIDE 10 MG/HR: 10 INJECTION, SOLUTION INTRAMUSCULAR; INTRAVENOUS at 14:00

## 2024-05-02 RX ADMIN — POLYETHYLENE GLYCOL 3350 17 G: 17 POWDER, FOR SOLUTION ORAL at 11:06

## 2024-05-02 RX ADMIN — DOCUSATE SODIUM 100 MG: 100 CAPSULE, LIQUID FILLED ORAL at 20:40

## 2024-05-02 RX ADMIN — RIFAXIMIN 400 MG: 200 TABLET ORAL at 20:40

## 2024-05-02 RX ADMIN — ALBUMIN (HUMAN) 25 G: 0.25 INJECTION, SOLUTION INTRAVENOUS at 17:28

## 2024-05-02 RX ADMIN — MORPHINE SULFATE 2 MG: 2 INJECTION, SOLUTION INTRAMUSCULAR; INTRAVENOUS at 14:46

## 2024-05-02 RX ADMIN — ALBUMIN (HUMAN) 25 G: 0.25 INJECTION, SOLUTION INTRAVENOUS at 05:10

## 2024-05-02 RX ADMIN — RIFAXIMIN 400 MG: 200 TABLET ORAL at 11:06

## 2024-05-02 RX ADMIN — Medication 2000 MG: at 22:20

## 2024-05-02 RX ADMIN — MORPHINE SULFATE 4 MG: 4 INJECTION, SOLUTION INTRAMUSCULAR; INTRAVENOUS at 20:21

## 2024-05-02 RX ADMIN — SODIUM CHLORIDE, PRESERVATIVE FREE 10 ML: 5 INJECTION INTRAVENOUS at 11:06

## 2024-05-02 RX ADMIN — RIFAXIMIN 400 MG: 200 TABLET ORAL at 17:29

## 2024-05-02 RX ADMIN — Medication 2000 MG: at 05:59

## 2024-05-02 ASSESSMENT — PAIN - FUNCTIONAL ASSESSMENT
PAIN_FUNCTIONAL_ASSESSMENT: PREVENTS OR INTERFERES SOME ACTIVE ACTIVITIES AND ADLS
PAIN_FUNCTIONAL_ASSESSMENT: PREVENTS OR INTERFERES WITH MANY ACTIVE NOT PASSIVE ACTIVITIES
PAIN_FUNCTIONAL_ASSESSMENT: PREVENTS OR INTERFERES SOME ACTIVE ACTIVITIES AND ADLS
PAIN_FUNCTIONAL_ASSESSMENT: PREVENTS OR INTERFERES SOME ACTIVE ACTIVITIES AND ADLS

## 2024-05-02 ASSESSMENT — PAIN DESCRIPTION - ONSET
ONSET: ON-GOING
ONSET: GRADUAL
ONSET: ON-GOING
ONSET: ON-GOING

## 2024-05-02 ASSESSMENT — PAIN SCALES - GENERAL
PAINLEVEL_OUTOF10: 8
PAINLEVEL_OUTOF10: 4
PAINLEVEL_OUTOF10: 8
PAINLEVEL_OUTOF10: 6
PAINLEVEL_OUTOF10: 0
PAINLEVEL_OUTOF10: 7
PAINLEVEL_OUTOF10: 8
PAINLEVEL_OUTOF10: 6
PAINLEVEL_OUTOF10: 8
PAINLEVEL_OUTOF10: 8

## 2024-05-02 ASSESSMENT — PAIN DESCRIPTION - ORIENTATION
ORIENTATION: RIGHT
ORIENTATION: RIGHT
ORIENTATION: UPPER
ORIENTATION: RIGHT
ORIENTATION: RIGHT
ORIENTATION: UPPER
ORIENTATION: RIGHT
ORIENTATION: RIGHT

## 2024-05-02 ASSESSMENT — PAIN DESCRIPTION - PAIN TYPE
TYPE: ACUTE PAIN

## 2024-05-02 ASSESSMENT — PAIN DESCRIPTION - FREQUENCY
FREQUENCY: INTERMITTENT

## 2024-05-02 ASSESSMENT — PAIN DESCRIPTION - LOCATION
LOCATION: LEG
LOCATION: ABDOMEN
LOCATION: LEG
LOCATION: ABDOMEN
LOCATION: LEG

## 2024-05-02 ASSESSMENT — PAIN DESCRIPTION - DESCRIPTORS
DESCRIPTORS: SORE;TENDER
DESCRIPTORS: THROBBING
DESCRIPTORS: SORE;ACHING;TENDER
DESCRIPTORS: DISCOMFORT;HEAVINESS;TIGHTNESS
DESCRIPTORS: THROBBING

## 2024-05-02 ASSESSMENT — PAIN DESCRIPTION - DIRECTION
RADIATING_TOWARDS: KNEE

## 2024-05-03 LAB
ALBUMIN SERPL-MCNC: 4.1 G/DL (ref 3.5–5.2)
ALP SERPL-CCNC: 71 U/L (ref 40–129)
ALT SERPL-CCNC: <5 U/L (ref 5–41)
ANION GAP SERPL CALCULATED.3IONS-SCNC: 13 MMOL/L (ref 9–17)
AST SERPL-CCNC: 31 U/L
BASOPHILS # BLD: 0 K/UL (ref 0–0.2)
BASOPHILS NFR BLD: 0 %
BILIRUB DIRECT SERPL-MCNC: 4.5 MG/DL
BILIRUB INDIRECT SERPL-MCNC: 5.1 MG/DL (ref 0–1)
BILIRUB SERPL-MCNC: 9.6 MG/DL (ref 0.3–1.2)
BUN SERPL-MCNC: 95 MG/DL (ref 6–20)
BUN/CREAT SERPL: 56 (ref 9–20)
CALCIUM SERPL-MCNC: 9.4 MG/DL (ref 8.6–10.4)
CHLORIDE SERPL-SCNC: 92 MMOL/L (ref 98–107)
CO2 SERPL-SCNC: 25 MMOL/L (ref 20–31)
CREAT SERPL-MCNC: 1.7 MG/DL (ref 0.7–1.2)
EOSINOPHIL # BLD: 0.98 K/UL (ref 0–0.4)
EOSINOPHILS RELATIVE PERCENT: 3 % (ref 1–4)
ERYTHROCYTE [DISTWIDTH] IN BLOOD BY AUTOMATED COUNT: 15 % (ref 11.8–14.4)
GFR, ESTIMATED: 48 ML/MIN/1.73M2
GLUCOSE SERPL-MCNC: 107 MG/DL (ref 70–99)
HCT VFR BLD AUTO: 30.5 % (ref 40.7–50.3)
HGB BLD-MCNC: 10 G/DL (ref 13–17)
IMM GRANULOCYTES # BLD AUTO: 0.98 K/UL (ref 0–0.3)
IMM GRANULOCYTES NFR BLD: 3 %
LYMPHOCYTES NFR BLD: 1.3 K/UL (ref 1–4.8)
LYMPHOCYTES RELATIVE PERCENT: 4 % (ref 24–44)
MAGNESIUM SERPL-MCNC: 2.4 MG/DL (ref 1.6–2.6)
MCH RBC QN AUTO: 36.9 PG (ref 25.2–33.5)
MCHC RBC AUTO-ENTMCNC: 32.8 G/DL (ref 28–38)
MCV RBC AUTO: 112.5 FL (ref 82.6–102.9)
MONOCYTES NFR BLD: 10 % (ref 1–7)
MONOCYTES NFR BLD: 3.26 K/UL (ref 0.2–0.8)
MORPHOLOGY: ABNORMAL
NEUTROPHILS NFR BLD: 80 % (ref 36–66)
NEUTS SEG NFR BLD: 26.08 K/UL (ref 1.8–7.7)
PHOSPHATE SERPL-MCNC: 5.1 MG/DL (ref 2.5–4.5)
PLATELET # BLD AUTO: 94 K/UL (ref 138–453)
PMV BLD AUTO: 11.5 FL (ref 8.1–13.5)
POTASSIUM SERPL-SCNC: 4.8 MMOL/L (ref 3.7–5.3)
PROT SERPL-MCNC: 6 G/DL (ref 6.4–8.3)
RBC # BLD AUTO: 2.71 M/UL (ref 4.21–5.77)
SODIUM SERPL-SCNC: 130 MMOL/L (ref 135–144)
WBC OTHER # BLD: 32.6 K/UL (ref 3.5–11.3)

## 2024-05-03 PROCEDURE — 2580000003 HC RX 258: Performed by: INTERNAL MEDICINE

## 2024-05-03 PROCEDURE — 2580000003 HC RX 258

## 2024-05-03 PROCEDURE — 2700000000 HC OXYGEN THERAPY PER DAY

## 2024-05-03 PROCEDURE — 83735 ASSAY OF MAGNESIUM: CPT

## 2024-05-03 PROCEDURE — 99232 SBSQ HOSP IP/OBS MODERATE 35: CPT | Performed by: NURSE PRACTITIONER

## 2024-05-03 PROCEDURE — 80076 HEPATIC FUNCTION PANEL: CPT

## 2024-05-03 PROCEDURE — 94761 N-INVAS EAR/PLS OXIMETRY MLT: CPT

## 2024-05-03 PROCEDURE — 6360000002 HC RX W HCPCS: Performed by: INTERNAL MEDICINE

## 2024-05-03 PROCEDURE — P9047 ALBUMIN (HUMAN), 25%, 50ML: HCPCS | Performed by: INTERNAL MEDICINE

## 2024-05-03 PROCEDURE — 6370000000 HC RX 637 (ALT 250 FOR IP): Performed by: INTERNAL MEDICINE

## 2024-05-03 PROCEDURE — 6370000000 HC RX 637 (ALT 250 FOR IP): Performed by: NURSE PRACTITIONER

## 2024-05-03 PROCEDURE — 6360000002 HC RX W HCPCS

## 2024-05-03 PROCEDURE — 80048 BASIC METABOLIC PNL TOTAL CA: CPT

## 2024-05-03 PROCEDURE — 6360000002 HC RX W HCPCS: Performed by: FAMILY MEDICINE

## 2024-05-03 PROCEDURE — 6370000000 HC RX 637 (ALT 250 FOR IP): Performed by: UROLOGY

## 2024-05-03 PROCEDURE — 6370000000 HC RX 637 (ALT 250 FOR IP): Performed by: FAMILY MEDICINE

## 2024-05-03 PROCEDURE — 36415 COLL VENOUS BLD VENIPUNCTURE: CPT

## 2024-05-03 PROCEDURE — 85025 COMPLETE CBC W/AUTO DIFF WBC: CPT

## 2024-05-03 PROCEDURE — 84100 ASSAY OF PHOSPHORUS: CPT

## 2024-05-03 PROCEDURE — 2580000003 HC RX 258: Performed by: UROLOGY

## 2024-05-03 PROCEDURE — 2060000000 HC ICU INTERMEDIATE R&B

## 2024-05-03 RX ORDER — SPIRONOLACTONE 100 MG/1
100 TABLET, FILM COATED ORAL 2 TIMES DAILY
Qty: 30 TABLET | Refills: 3 | Status: SHIPPED | OUTPATIENT
Start: 2024-05-03

## 2024-05-03 RX ORDER — SPIRONOLACTONE 100 MG/1
100 TABLET, FILM COATED ORAL DAILY
Status: DISCONTINUED | OUTPATIENT
Start: 2024-05-04 | End: 2024-05-06

## 2024-05-03 RX ADMIN — RIFAXIMIN 400 MG: 200 TABLET ORAL at 08:44

## 2024-05-03 RX ADMIN — Medication 2000 MG: at 05:36

## 2024-05-03 RX ADMIN — ALBUMIN (HUMAN) 25 G: 0.25 INJECTION, SOLUTION INTRAVENOUS at 05:33

## 2024-05-03 RX ADMIN — ALBUMIN (HUMAN) 25 G: 0.25 INJECTION, SOLUTION INTRAVENOUS at 16:12

## 2024-05-03 RX ADMIN — ALBUMIN (HUMAN) 25 G: 0.25 INJECTION, SOLUTION INTRAVENOUS at 22:27

## 2024-05-03 RX ADMIN — FUROSEMIDE 20 MG/HR: 10 INJECTION, SOLUTION INTRAMUSCULAR; INTRAVENOUS at 20:04

## 2024-05-03 RX ADMIN — FUROSEMIDE 10 MG/HR: 10 INJECTION, SOLUTION INTRAMUSCULAR; INTRAVENOUS at 02:04

## 2024-05-03 RX ADMIN — MORPHINE SULFATE 4 MG: 4 INJECTION, SOLUTION INTRAMUSCULAR; INTRAVENOUS at 23:21

## 2024-05-03 RX ADMIN — SODIUM ZIRCONIUM CYCLOSILICATE 5 G: 5 POWDER, FOR SUSPENSION ORAL at 10:49

## 2024-05-03 RX ADMIN — DOCUSATE SODIUM 100 MG: 100 CAPSULE, LIQUID FILLED ORAL at 21:02

## 2024-05-03 RX ADMIN — Medication 750 MG: at 15:41

## 2024-05-03 RX ADMIN — SODIUM CHLORIDE, PRESERVATIVE FREE 10 ML: 5 INJECTION INTRAVENOUS at 08:47

## 2024-05-03 RX ADMIN — FUROSEMIDE 10 MG/HR: 10 INJECTION, SOLUTION INTRAMUSCULAR; INTRAVENOUS at 13:09

## 2024-05-03 RX ADMIN — DOCUSATE SODIUM 100 MG: 100 CAPSULE, LIQUID FILLED ORAL at 08:45

## 2024-05-03 RX ADMIN — RIFAXIMIN 400 MG: 200 TABLET ORAL at 14:07

## 2024-05-03 RX ADMIN — MORPHINE SULFATE 4 MG: 4 INJECTION, SOLUTION INTRAMUSCULAR; INTRAVENOUS at 13:16

## 2024-05-03 RX ADMIN — FOLIC ACID 1 MG: 1 TABLET ORAL at 08:46

## 2024-05-03 RX ADMIN — Medication 2000 MG: at 14:06

## 2024-05-03 RX ADMIN — SODIUM ZIRCONIUM CYCLOSILICATE 5 G: 5 POWDER, FOR SUSPENSION ORAL at 16:28

## 2024-05-03 RX ADMIN — RIFAXIMIN 400 MG: 200 TABLET ORAL at 21:02

## 2024-05-03 RX ADMIN — SODIUM CHLORIDE, PRESERVATIVE FREE 10 ML: 5 INJECTION INTRAVENOUS at 21:02

## 2024-05-03 RX ADMIN — Medication 2000 MG: at 21:33

## 2024-05-03 RX ADMIN — ALBUMIN (HUMAN) 25 G: 0.25 INJECTION, SOLUTION INTRAVENOUS at 10:58

## 2024-05-03 RX ADMIN — SPIRONOLACTONE 100 MG: 25 TABLET ORAL at 08:45

## 2024-05-03 RX ADMIN — Medication 100 MG: at 08:46

## 2024-05-03 RX ADMIN — POLYETHYLENE GLYCOL 3350 17 G: 17 POWDER, FOR SOLUTION ORAL at 08:43

## 2024-05-03 ASSESSMENT — PAIN SCALES - GENERAL
PAINLEVEL_OUTOF10: 8
PAINLEVEL_OUTOF10: 3
PAINLEVEL_OUTOF10: 5
PAINLEVEL_OUTOF10: 8

## 2024-05-03 ASSESSMENT — PAIN DESCRIPTION - DESCRIPTORS
DESCRIPTORS: ACHING

## 2024-05-03 ASSESSMENT — PAIN DESCRIPTION - LOCATION
LOCATION: ABDOMEN;LEG
LOCATION: ABDOMEN;LEG
LOCATION: ABDOMEN

## 2024-05-03 ASSESSMENT — PAIN - FUNCTIONAL ASSESSMENT
PAIN_FUNCTIONAL_ASSESSMENT: PREVENTS OR INTERFERES SOME ACTIVE ACTIVITIES AND ADLS
PAIN_FUNCTIONAL_ASSESSMENT: ACTIVITIES ARE NOT PREVENTED

## 2024-05-03 ASSESSMENT — PAIN DESCRIPTION - ONSET: ONSET: ON-GOING

## 2024-05-03 ASSESSMENT — PAIN DESCRIPTION - FREQUENCY: FREQUENCY: CONTINUOUS

## 2024-05-03 ASSESSMENT — PAIN DESCRIPTION - ORIENTATION: ORIENTATION: RIGHT

## 2024-05-03 ASSESSMENT — PAIN DESCRIPTION - PAIN TYPE: TYPE: CHRONIC PAIN

## 2024-05-03 NOTE — DISCHARGE SUMMARY
Sepsis  Septic shock  Urosepsis-E. coli  Cellulitis right lower extremity  Alcoholic cirrhosis with ascites and pleural effusion  Anasarca  MACARIO  Urinary retention with urethral stricture  Metabolic acidemia  Chronic malnutrition  Hypoglycemia  Elevated LFTs  Consult  Critical care, nephrology, urology, GI, infectious disease  Detail  53-year-old  male was presented to the emergency room with cellulitis of right leg.  Patient stated that he had a log fell on his forefoot and subsequently he started to have inflammatory changes with blister formation on his leg.  He was hypotensive.  He was placed on Sonny-Synephrine and Levophed, empiric IV antibiotic-vancomycin, cefepime.  We had difficulty weaning him off the pressors  After long Melissa were able to gradually wean him off.  He has been normotensive  Urine culture was positive for E. coli.  We also had thoracentesis with removal of 950 mL of nonmalignant fluid.  He also had paracentesis that was negative for SBP  Infectious disease was involved and patient was placed on Keflex.  He continues to be afebrile hemodynamically stable however he continues to have elevated leukocytosis with left shift  MACARIO, volume overload, pulmonary hypertension, anasarca with underlying alcoholic cirrhosis with ascites.  Nephrology on board who has been aggressively diuresing him with Lasix drip, Aldactone, albumin, fluid restriction however his renal function continued to return.  Hypokalemia is being treated with Lokelma, his hyponatremia is also worsening secondary to volume overload  Metabolic acidemia has been resolved.  Patient had bicarb drip  During the hospitalization he was seen to have urinary retention.  Urology was consulted.  He had emergent cystoscopy with lysis of bulbous urethral stricture and indwelling Cifuentes catheter.  He does not have hematuria.  He has good urine output  In view of his declining kidney function and round responsive to aggressive diuresis,

## 2024-05-04 LAB
ANION GAP SERPL CALCULATED.3IONS-SCNC: 13 MMOL/L (ref 9–17)
BASOPHILS # BLD: 0 K/UL (ref 0–0.2)
BASOPHILS NFR BLD: 0 %
BUN SERPL-MCNC: 106 MG/DL (ref 6–20)
BUN/CREAT SERPL: 38 (ref 9–20)
CALCIUM SERPL-MCNC: 9.6 MG/DL (ref 8.6–10.4)
CHLORIDE SERPL-SCNC: 89 MMOL/L (ref 98–107)
CO2 SERPL-SCNC: 26 MMOL/L (ref 20–31)
CREAT SERPL-MCNC: 2.8 MG/DL (ref 0.7–1.2)
EOSINOPHIL # BLD: 0.55 K/UL (ref 0–0.4)
EOSINOPHILS RELATIVE PERCENT: 2 % (ref 1–4)
ERYTHROCYTE [DISTWIDTH] IN BLOOD BY AUTOMATED COUNT: 15.5 % (ref 11.8–14.4)
GFR, ESTIMATED: 26 ML/MIN/1.73M2
GLUCOSE SERPL-MCNC: 123 MG/DL (ref 70–99)
HCT VFR BLD AUTO: 27.6 % (ref 40.7–50.3)
HGB BLD-MCNC: 9.3 G/DL (ref 13–17)
IMM GRANULOCYTES # BLD AUTO: 0.83 K/UL (ref 0–0.3)
IMM GRANULOCYTES NFR BLD: 3 %
LYMPHOCYTES NFR BLD: 1.38 K/UL (ref 1–4.8)
LYMPHOCYTES RELATIVE PERCENT: 5 % (ref 24–44)
MAGNESIUM SERPL-MCNC: 2.5 MG/DL (ref 1.6–2.6)
MCH RBC QN AUTO: 37.3 PG (ref 25.2–33.5)
MCHC RBC AUTO-ENTMCNC: 33.7 G/DL (ref 28.4–34.8)
MCV RBC AUTO: 110.8 FL (ref 82.6–102.9)
MICROORGANISM SPEC CULT: NORMAL
MICROORGANISM SPEC CULT: NORMAL
MONOCYTES NFR BLD: 2.48 K/UL (ref 0.2–0.8)
MONOCYTES NFR BLD: 9 % (ref 1–7)
MORPHOLOGY: ABNORMAL
NEUTROPHILS NFR BLD: 81 % (ref 36–66)
NEUTS SEG NFR BLD: 22.26 K/UL (ref 1.8–7.7)
NRBC BLD-RTO: 0 PER 100 WBC
PHOSPHATE SERPL-MCNC: 5.8 MG/DL (ref 2.5–4.5)
PLATELET # BLD AUTO: 117 K/UL (ref 138–453)
PMV BLD AUTO: 11 FL (ref 8.1–13.5)
POTASSIUM SERPL-SCNC: 4.1 MMOL/L (ref 3.7–5.3)
RBC # BLD AUTO: 2.49 M/UL (ref 4.21–5.77)
SERVICE CMNT-IMP: NORMAL
SERVICE CMNT-IMP: NORMAL
SODIUM SERPL-SCNC: 128 MMOL/L (ref 135–144)
SPECIMEN DESCRIPTION: NORMAL
SPECIMEN DESCRIPTION: NORMAL
WBC OTHER # BLD: 27.5 K/UL (ref 3.5–11.3)

## 2024-05-04 PROCEDURE — 2580000003 HC RX 258: Performed by: INTERNAL MEDICINE

## 2024-05-04 PROCEDURE — 6370000000 HC RX 637 (ALT 250 FOR IP): Performed by: INTERNAL MEDICINE

## 2024-05-04 PROCEDURE — 94761 N-INVAS EAR/PLS OXIMETRY MLT: CPT

## 2024-05-04 PROCEDURE — 6360000002 HC RX W HCPCS: Performed by: INTERNAL MEDICINE

## 2024-05-04 PROCEDURE — 83735 ASSAY OF MAGNESIUM: CPT

## 2024-05-04 PROCEDURE — 99233 SBSQ HOSP IP/OBS HIGH 50: CPT | Performed by: FAMILY MEDICINE

## 2024-05-04 PROCEDURE — 85025 COMPLETE CBC W/AUTO DIFF WBC: CPT

## 2024-05-04 PROCEDURE — 99232 SBSQ HOSP IP/OBS MODERATE 35: CPT | Performed by: NURSE PRACTITIONER

## 2024-05-04 PROCEDURE — P9047 ALBUMIN (HUMAN), 25%, 50ML: HCPCS | Performed by: INTERNAL MEDICINE

## 2024-05-04 PROCEDURE — 2060000000 HC ICU INTERMEDIATE R&B

## 2024-05-04 PROCEDURE — 36415 COLL VENOUS BLD VENIPUNCTURE: CPT

## 2024-05-04 PROCEDURE — 84100 ASSAY OF PHOSPHORUS: CPT

## 2024-05-04 PROCEDURE — 2580000003 HC RX 258: Performed by: UROLOGY

## 2024-05-04 PROCEDURE — 6360000002 HC RX W HCPCS: Performed by: UROLOGY

## 2024-05-04 PROCEDURE — 6370000000 HC RX 637 (ALT 250 FOR IP): Performed by: FAMILY MEDICINE

## 2024-05-04 PROCEDURE — 6370000000 HC RX 637 (ALT 250 FOR IP): Performed by: UROLOGY

## 2024-05-04 PROCEDURE — 2700000000 HC OXYGEN THERAPY PER DAY

## 2024-05-04 PROCEDURE — 6360000002 HC RX W HCPCS: Performed by: FAMILY MEDICINE

## 2024-05-04 PROCEDURE — 80048 BASIC METABOLIC PNL TOTAL CA: CPT

## 2024-05-04 PROCEDURE — 6370000000 HC RX 637 (ALT 250 FOR IP): Performed by: NURSE PRACTITIONER

## 2024-05-04 RX ORDER — BUMETANIDE 0.25 MG/ML
2 INJECTION INTRAMUSCULAR; INTRAVENOUS ONCE
Status: COMPLETED | OUTPATIENT
Start: 2024-05-04 | End: 2024-05-04

## 2024-05-04 RX ORDER — PROCHLORPERAZINE EDISYLATE 5 MG/ML
10 INJECTION INTRAMUSCULAR; INTRAVENOUS EVERY 6 HOURS PRN
Status: DISCONTINUED | OUTPATIENT
Start: 2024-05-04 | End: 2024-05-06 | Stop reason: HOSPADM

## 2024-05-04 RX ORDER — ALBUMIN (HUMAN) 12.5 G/50ML
25 SOLUTION INTRAVENOUS EVERY 6 HOURS
Status: COMPLETED | OUTPATIENT
Start: 2024-05-04 | End: 2024-05-04

## 2024-05-04 RX ORDER — LACTULOSE 10 G/15ML
20 SOLUTION ORAL 3 TIMES DAILY
Status: DISCONTINUED | OUTPATIENT
Start: 2024-05-04 | End: 2024-05-05

## 2024-05-04 RX ADMIN — Medication 2000 MG: at 22:09

## 2024-05-04 RX ADMIN — ALBUMIN (HUMAN) 25 G: 0.25 INJECTION, SOLUTION INTRAVENOUS at 04:47

## 2024-05-04 RX ADMIN — MORPHINE SULFATE 4 MG: 4 INJECTION, SOLUTION INTRAMUSCULAR; INTRAVENOUS at 12:59

## 2024-05-04 RX ADMIN — SPIRONOLACTONE 100 MG: 100 TABLET ORAL at 10:04

## 2024-05-04 RX ADMIN — RIFAXIMIN 400 MG: 200 TABLET ORAL at 10:04

## 2024-05-04 RX ADMIN — RIFAXIMIN 400 MG: 200 TABLET ORAL at 22:04

## 2024-05-04 RX ADMIN — SODIUM CHLORIDE, PRESERVATIVE FREE 10 ML: 5 INJECTION INTRAVENOUS at 22:28

## 2024-05-04 RX ADMIN — BUMETANIDE 2 MG: 0.25 INJECTION INTRAMUSCULAR; INTRAVENOUS at 19:00

## 2024-05-04 RX ADMIN — LACTULOSE 20 G: 20 SOLUTION ORAL at 14:25

## 2024-05-04 RX ADMIN — LACTULOSE 20 G: 20 SOLUTION ORAL at 22:05

## 2024-05-04 RX ADMIN — MORPHINE SULFATE 4 MG: 4 INJECTION, SOLUTION INTRAMUSCULAR; INTRAVENOUS at 09:46

## 2024-05-04 RX ADMIN — LACTULOSE 20 G: 20 SOLUTION ORAL at 10:11

## 2024-05-04 RX ADMIN — RIFAXIMIN 400 MG: 200 TABLET ORAL at 14:25

## 2024-05-04 RX ADMIN — Medication 100 MG: at 10:04

## 2024-05-04 RX ADMIN — FUROSEMIDE 20 MG/HR: 10 INJECTION, SOLUTION INTRAMUSCULAR; INTRAVENOUS at 11:08

## 2024-05-04 RX ADMIN — MORPHINE SULFATE 4 MG: 4 INJECTION, SOLUTION INTRAMUSCULAR; INTRAVENOUS at 22:05

## 2024-05-04 RX ADMIN — SODIUM CHLORIDE: 9 INJECTION, SOLUTION INTRAVENOUS at 22:08

## 2024-05-04 RX ADMIN — Medication 2000 MG: at 14:25

## 2024-05-04 RX ADMIN — ONDANSETRON 4 MG: 2 INJECTION INTRAMUSCULAR; INTRAVENOUS at 18:03

## 2024-05-04 RX ADMIN — ALBUMIN (HUMAN) 25 G: 0.25 INJECTION, SOLUTION INTRAVENOUS at 18:03

## 2024-05-04 RX ADMIN — DOCUSATE SODIUM 100 MG: 100 CAPSULE, LIQUID FILLED ORAL at 10:05

## 2024-05-04 RX ADMIN — SODIUM CHLORIDE, PRESERVATIVE FREE 10 ML: 5 INJECTION INTRAVENOUS at 09:46

## 2024-05-04 RX ADMIN — SODIUM ZIRCONIUM CYCLOSILICATE 5 G: 5 POWDER, FOR SUSPENSION ORAL at 10:04

## 2024-05-04 RX ADMIN — Medication 2000 MG: at 05:33

## 2024-05-04 RX ADMIN — FOLIC ACID 1 MG: 1 TABLET ORAL at 10:04

## 2024-05-04 RX ADMIN — FUROSEMIDE 20 MG/HR: 10 INJECTION, SOLUTION INTRAMUSCULAR; INTRAVENOUS at 00:51

## 2024-05-04 RX ADMIN — PROCHLORPERAZINE EDISYLATE 10 MG: 5 INJECTION INTRAMUSCULAR; INTRAVENOUS at 18:49

## 2024-05-04 RX ADMIN — POLYETHYLENE GLYCOL 3350 17 G: 17 POWDER, FOR SOLUTION ORAL at 10:04

## 2024-05-04 RX ADMIN — DOCUSATE SODIUM 100 MG: 100 CAPSULE, LIQUID FILLED ORAL at 22:05

## 2024-05-04 RX ADMIN — ALBUMIN (HUMAN) 25 G: 0.25 INJECTION, SOLUTION INTRAVENOUS at 12:58

## 2024-05-04 ASSESSMENT — PAIN DESCRIPTION - ONSET: ONSET: GRADUAL

## 2024-05-04 ASSESSMENT — PAIN DESCRIPTION - PAIN TYPE
TYPE: ACUTE PAIN
TYPE: ACUTE PAIN

## 2024-05-04 ASSESSMENT — PAIN DESCRIPTION - LOCATION
LOCATION: LEG

## 2024-05-04 ASSESSMENT — PAIN DESCRIPTION - ORIENTATION
ORIENTATION: RIGHT;LOWER
ORIENTATION: RIGHT
ORIENTATION: RIGHT;LOWER
ORIENTATION: RIGHT;LOWER

## 2024-05-04 ASSESSMENT — PAIN SCALES - GENERAL
PAINLEVEL_OUTOF10: 8
PAINLEVEL_OUTOF10: 2
PAINLEVEL_OUTOF10: 9
PAINLEVEL_OUTOF10: 6
PAINLEVEL_OUTOF10: 9
PAINLEVEL_OUTOF10: 5
PAINLEVEL_OUTOF10: 7

## 2024-05-04 ASSESSMENT — PAIN DESCRIPTION - DESCRIPTORS
DESCRIPTORS: ACHING
DESCRIPTORS: ACHING
DESCRIPTORS: ACHING;THROBBING

## 2024-05-04 NOTE — FLOWSHEET NOTE
Pt's BUN and Creatinine have increased this AM. Nephrology notified.  BUN came back critical at 106 from 95.  Creatinine is increased from 1.7 to 2.8.   05/04/24 0637   Treatment Team Notification   Reason for Communication Critical results   Type of Critical Result Laboratory   Critical Lab Information    Critical Lab Result Type Other (comment)  (BUN)   Name of Team Member Notified Sami   Treatment Team Role Consulting Provider   Method of Communication Secure Message   Response Waiting for response   Notification Time 0637

## 2024-05-05 LAB
ANION GAP SERPL CALCULATED.3IONS-SCNC: 17 MMOL/L (ref 9–17)
BASOPHILS # BLD: 0 K/UL (ref 0–0.2)
BASOPHILS NFR BLD: 0 % (ref 0–2)
BUN SERPL-MCNC: 121 MG/DL (ref 6–20)
BUN/CREAT SERPL: 32 (ref 9–20)
CALCIUM SERPL-MCNC: 9.6 MG/DL (ref 8.6–10.4)
CHLORIDE SERPL-SCNC: 90 MMOL/L (ref 98–107)
CO2 SERPL-SCNC: 22 MMOL/L (ref 20–31)
CREAT SERPL-MCNC: 3.8 MG/DL (ref 0.7–1.2)
EOSINOPHIL # BLD: 0.52 K/UL (ref 0–0.44)
EOSINOPHILS RELATIVE PERCENT: 2 % (ref 1–4)
ERYTHROCYTE [DISTWIDTH] IN BLOOD BY AUTOMATED COUNT: 15.7 % (ref 11.8–14.4)
GFR, ESTIMATED: 18 ML/MIN/1.73M2
GLUCOSE SERPL-MCNC: 118 MG/DL (ref 70–99)
HCT VFR BLD AUTO: 27.2 % (ref 40.7–50.3)
HGB BLD-MCNC: 9.1 G/DL (ref 13–17)
IMM GRANULOCYTES # BLD AUTO: 0.52 K/UL (ref 0–0.3)
IMM GRANULOCYTES NFR BLD: 2 %
LYMPHOCYTES NFR BLD: 1.04 K/UL (ref 1.1–3.7)
LYMPHOCYTES RELATIVE PERCENT: 4 % (ref 24–43)
MAGNESIUM SERPL-MCNC: 2.7 MG/DL (ref 1.6–2.6)
MCH RBC QN AUTO: 37 PG (ref 25.2–33.5)
MCHC RBC AUTO-ENTMCNC: 33.5 G/DL (ref 28.4–34.8)
MCV RBC AUTO: 110.6 FL (ref 82.6–102.9)
MONOCYTES NFR BLD: 11 % (ref 3–12)
MONOCYTES NFR BLD: 2.85 K/UL (ref 0.1–1.2)
NEUTROPHILS NFR BLD: 81 % (ref 36–65)
NEUTS SEG NFR BLD: 20.97 K/UL (ref 1.5–8.1)
NRBC BLD-RTO: 0 PER 100 WBC
PHOSPHATE SERPL-MCNC: 6.9 MG/DL (ref 2.5–4.5)
PLATELET # BLD AUTO: 125 K/UL (ref 138–453)
PMV BLD AUTO: 10.3 FL (ref 8.1–13.5)
POTASSIUM SERPL-SCNC: 4.6 MMOL/L (ref 3.7–5.3)
RBC # BLD AUTO: 2.46 M/UL (ref 4.21–5.77)
SODIUM SERPL-SCNC: 129 MMOL/L (ref 135–144)
WBC OTHER # BLD: 25.9 K/UL (ref 3.5–11.3)

## 2024-05-05 PROCEDURE — 80048 BASIC METABOLIC PNL TOTAL CA: CPT

## 2024-05-05 PROCEDURE — 99232 SBSQ HOSP IP/OBS MODERATE 35: CPT | Performed by: INTERNAL MEDICINE

## 2024-05-05 PROCEDURE — 6370000000 HC RX 637 (ALT 250 FOR IP): Performed by: NURSE PRACTITIONER

## 2024-05-05 PROCEDURE — 85025 COMPLETE CBC W/AUTO DIFF WBC: CPT

## 2024-05-05 PROCEDURE — 2700000000 HC OXYGEN THERAPY PER DAY

## 2024-05-05 PROCEDURE — 83735 ASSAY OF MAGNESIUM: CPT

## 2024-05-05 PROCEDURE — 6360000002 HC RX W HCPCS: Performed by: UROLOGY

## 2024-05-05 PROCEDURE — 6370000000 HC RX 637 (ALT 250 FOR IP): Performed by: UROLOGY

## 2024-05-05 PROCEDURE — 6360000002 HC RX W HCPCS: Performed by: FAMILY MEDICINE

## 2024-05-05 PROCEDURE — 36415 COLL VENOUS BLD VENIPUNCTURE: CPT

## 2024-05-05 PROCEDURE — 6370000000 HC RX 637 (ALT 250 FOR IP): Performed by: INTERNAL MEDICINE

## 2024-05-05 PROCEDURE — 6360000002 HC RX W HCPCS: Performed by: INTERNAL MEDICINE

## 2024-05-05 PROCEDURE — 2580000003 HC RX 258: Performed by: INTERNAL MEDICINE

## 2024-05-05 PROCEDURE — 94761 N-INVAS EAR/PLS OXIMETRY MLT: CPT

## 2024-05-05 PROCEDURE — 84100 ASSAY OF PHOSPHORUS: CPT

## 2024-05-05 PROCEDURE — 6370000000 HC RX 637 (ALT 250 FOR IP): Performed by: FAMILY MEDICINE

## 2024-05-05 PROCEDURE — 2060000000 HC ICU INTERMEDIATE R&B

## 2024-05-05 PROCEDURE — 2580000003 HC RX 258: Performed by: UROLOGY

## 2024-05-05 RX ORDER — PANTOPRAZOLE SODIUM 40 MG/1
40 TABLET, DELAYED RELEASE ORAL
Status: DISCONTINUED | OUTPATIENT
Start: 2024-05-05 | End: 2024-05-06 | Stop reason: HOSPADM

## 2024-05-05 RX ORDER — POLYETHYLENE GLYCOL 3350 17 G/17G
17 POWDER, FOR SOLUTION ORAL DAILY
Status: DISCONTINUED | OUTPATIENT
Start: 2024-05-05 | End: 2024-05-06 | Stop reason: HOSPADM

## 2024-05-05 RX ORDER — MIDODRINE HYDROCHLORIDE 10 MG/1
10 TABLET ORAL 3 TIMES DAILY PRN
Status: DISCONTINUED | OUTPATIENT
Start: 2024-05-05 | End: 2024-05-06 | Stop reason: HOSPADM

## 2024-05-05 RX ADMIN — RIFAXIMIN 400 MG: 200 TABLET ORAL at 10:50

## 2024-05-05 RX ADMIN — FOLIC ACID 1 MG: 1 TABLET ORAL at 10:49

## 2024-05-05 RX ADMIN — RIFAXIMIN 400 MG: 200 TABLET ORAL at 14:16

## 2024-05-05 RX ADMIN — PROCHLORPERAZINE EDISYLATE 10 MG: 5 INJECTION INTRAMUSCULAR; INTRAVENOUS at 07:43

## 2024-05-05 RX ADMIN — MORPHINE SULFATE 4 MG: 4 INJECTION, SOLUTION INTRAMUSCULAR; INTRAVENOUS at 22:49

## 2024-05-05 RX ADMIN — SODIUM CHLORIDE, PRESERVATIVE FREE 10 ML: 5 INJECTION INTRAVENOUS at 07:43

## 2024-05-05 RX ADMIN — SODIUM CHLORIDE, PRESERVATIVE FREE 10 ML: 5 INJECTION INTRAVENOUS at 21:24

## 2024-05-05 RX ADMIN — DOCUSATE SODIUM 100 MG: 100 CAPSULE, LIQUID FILLED ORAL at 21:18

## 2024-05-05 RX ADMIN — ONDANSETRON 4 MG: 2 INJECTION INTRAMUSCULAR; INTRAVENOUS at 04:31

## 2024-05-05 RX ADMIN — Medication 2000 MG: at 14:17

## 2024-05-05 RX ADMIN — SODIUM ZIRCONIUM CYCLOSILICATE 5 G: 5 POWDER, FOR SUSPENSION ORAL at 10:50

## 2024-05-05 RX ADMIN — PANTOPRAZOLE SODIUM 40 MG: 40 TABLET, DELAYED RELEASE ORAL at 10:57

## 2024-05-05 RX ADMIN — SODIUM CHLORIDE: 9 INJECTION, SOLUTION INTRAVENOUS at 06:21

## 2024-05-05 RX ADMIN — SPIRONOLACTONE 100 MG: 100 TABLET ORAL at 10:50

## 2024-05-05 RX ADMIN — RIFAXIMIN 400 MG: 200 TABLET ORAL at 21:18

## 2024-05-05 RX ADMIN — Medication 2000 MG: at 06:21

## 2024-05-05 RX ADMIN — DOCUSATE SODIUM 100 MG: 100 CAPSULE, LIQUID FILLED ORAL at 10:50

## 2024-05-05 RX ADMIN — SODIUM ZIRCONIUM CYCLOSILICATE 5 G: 5 POWDER, FOR SUSPENSION ORAL at 18:07

## 2024-05-05 RX ADMIN — Medication 2000 MG: at 21:24

## 2024-05-05 RX ADMIN — Medication 100 MG: at 10:50

## 2024-05-05 RX ADMIN — MORPHINE SULFATE 4 MG: 4 INJECTION, SOLUTION INTRAMUSCULAR; INTRAVENOUS at 18:06

## 2024-05-05 ASSESSMENT — PAIN SCALES - GENERAL
PAINLEVEL_OUTOF10: 2
PAINLEVEL_OUTOF10: 5
PAINLEVEL_OUTOF10: 2
PAINLEVEL_OUTOF10: 9
PAINLEVEL_OUTOF10: 9
PAINLEVEL_OUTOF10: 2
PAINLEVEL_OUTOF10: 3

## 2024-05-05 ASSESSMENT — PAIN DESCRIPTION - DESCRIPTORS
DESCRIPTORS: ACHING
DESCRIPTORS: ACHING

## 2024-05-05 ASSESSMENT — PAIN DESCRIPTION - LOCATION
LOCATION: LEG

## 2024-05-05 ASSESSMENT — PAIN DESCRIPTION - ORIENTATION
ORIENTATION: RIGHT
ORIENTATION: RIGHT
ORIENTATION: RIGHT;LOWER

## 2024-05-05 ASSESSMENT — PAIN DESCRIPTION - FREQUENCY: FREQUENCY: INTERMITTENT

## 2024-05-05 ASSESSMENT — PAIN - FUNCTIONAL ASSESSMENT: PAIN_FUNCTIONAL_ASSESSMENT: ACTIVITIES ARE NOT PREVENTED

## 2024-05-05 ASSESSMENT — PAIN DESCRIPTION - ONSET: ONSET: GRADUAL

## 2024-05-05 ASSESSMENT — PAIN DESCRIPTION - PAIN TYPE: TYPE: ACUTE PAIN

## 2024-05-05 NOTE — PLAN OF CARE
Problem: Discharge Planning  Goal: Discharge to home or other facility with appropriate resources  4/23/2024 2325 by Chante Graham RN  Outcome: Progressing  4/23/2024 1715 by Adelita Wilson RN  Outcome: Progressing     Problem: Skin/Tissue Integrity  Goal: Absence of new skin breakdown  Description: 1.  Monitor for areas of redness and/or skin breakdown  2.  Assess vascular access sites hourly  3.  Every 4-6 hours minimum:  Change oxygen saturation probe site  4.  Every 4-6 hours:  If on nasal continuous positive airway pressure, respiratory therapy assess nares and determine need for appliance change or resting period.  4/23/2024 2325 by Chante Graham RN  Outcome: Progressing  4/23/2024 1715 by Adelita Wilson RN  Outcome: Progressing     Problem: Safety - Adult  Goal: Free from fall injury  4/23/2024 2325 by Chante Graham RN  Outcome: Progressing  4/23/2024 1715 by Adelita Wilson RN  Outcome: Progressing     Problem: ABCDS Injury Assessment  Goal: Absence of physical injury  4/23/2024 2325 by Chante Graham RN  Outcome: Progressing  4/23/2024 1715 by Adelita Wilson RN  Outcome: Progressing     Problem: Risk for Elopement  Goal: Patient will not exit the unit/facility without proper excort  4/23/2024 2325 by Chante Graham RN  Outcome: Progressing  4/23/2024 1715 by Adelita Wilson RN  Outcome: Progressing     Problem: Pain  Goal: Verbalizes/displays adequate comfort level or baseline comfort level  4/23/2024 2325 by Chante Graham RN  Outcome: Progressing  4/23/2024 1715 by Adelita Wilson RN  Outcome: Progressing     
  Problem: Discharge Planning  Goal: Discharge to home or other facility with appropriate resources  4/24/2024 1921 by Ela Harris RN  Outcome: Not Progressing  Flowsheets  Taken 4/24/2024 1900 by Owen Warnre RN  Discharge to home or other facility with appropriate resources: Identify barriers to discharge with patient and caregiver  Taken 4/24/2024 0800 by Ela Harris RN  Discharge to home or other facility with appropriate resources: Identify barriers to discharge with patient and caregiver     Problem: Skin/Tissue Integrity  Goal: Absence of new skin breakdown  Description: 1.  Monitor for areas of redness and/or skin breakdown  2.  Assess vascular access sites hourly  3.  Every 4-6 hours minimum:  Change oxygen saturation probe site  4.  Every 4-6 hours:  If on nasal continuous positive airway pressure, respiratory therapy assess nares and determine need for appliance change or resting period.  4/24/2024 1946 by Owen Warner RN  Outcome: Not Progressing  4/24/2024 1921 by Ela Harris RN  Outcome: Progressing     Problem: Respiratory - Adult  Goal: Achieves optimal ventilation and oxygenation  4/24/2024 1946 by Owen Warner RN  Outcome: Not Progressing  4/24/2024 1921 by Ela Harris RN  Outcome: Progressing     Problem: Skin/Tissue Integrity - Adult  Goal: Incisions, wounds, or drain sites healing without S/S of infection  4/24/2024 1946 by Owen Warner RN  Outcome: Not Progressing  4/24/2024 1921 by Ela Harris RN  Reactivated     Problem: Discharge Planning  Goal: Discharge to home or other facility with appropriate resources  4/24/2024 1921 by Ela Harris, RN  Outcome: Not Progressing  Flowsheets  Taken 4/24/2024 1900 by Owen Warner RN  Discharge to home or other facility with appropriate resources: Identify barriers to discharge with patient and caregiver  Taken 4/24/2024 0800 by Ela Harris, RN  Discharge to home or other 
  Problem: Discharge Planning  Goal: Discharge to home or other facility with appropriate resources  4/26/2024 0158 by Dominga Taylor RN  Outcome: Progressing     Problem: Skin/Tissue Integrity  Goal: Absence of new skin breakdown  Description: 1.  Monitor for areas of redness and/or skin breakdown  2.  Assess vascular access sites hourly  3.  Every 4-6 hours minimum:  Change oxygen saturation probe site  4.  Every 4-6 hours:  If on nasal continuous positive airway pressure, respiratory therapy assess nares and determine need for appliance change or resting period.  4/26/2024 0158 by Dominga Taylor RN  Outcome: Progressing     Problem: Safety - Adult  Goal: Free from fall injury  4/26/2024 0158 by Dominga Taylor RN  Outcome: Progressing  Flowsheets (Taken 4/25/2024 1715 by Ela Harris, RN)  Free From Fall Injury: Instruct family/caregiver on patient safety     Problem: ABCDS Injury Assessment  Goal: Absence of physical injury  4/26/2024 0158 by Dominga Taylor RN  Outcome: Progressing  Flowsheets (Taken 4/25/2024 1715 by Ela Harris, RN)  Absence of Physical Injury: Implement safety measures based on patient assessment     Problem: Risk for Elopement  Goal: Patient will not exit the unit/facility without proper excort  4/26/2024 0158 by Dominga Taylor RN  Outcome: Progressing  Flowsheets (Taken 4/25/2024 1600 by Ela Harris, RN)  Nursing Interventions for Elopement Risk: Assist with personal care needs such as toileting, eating, dressing, as needed to reduce the risk of wandering     Problem: Pain  Goal: Verbalizes/displays adequate comfort level or baseline comfort level  4/26/2024 0158 by Dominga Taylor RN  Outcome: Progressing  Flowsheets  Taken 4/25/2024 2000 by Dominga Taylor RN  Verbalizes/displays adequate comfort level or baseline comfort level:   Encourage patient to monitor pain and request assistance   Assess pain using appropriate pain scale  Taken 4/25/2024 1600 by Steven 
  Problem: Discharge Planning  Goal: Discharge to home or other facility with appropriate resources  4/27/2024 1012 by Libia Mesa RN  Outcome: Progressing  Flowsheets (Taken 4/27/2024 0800)  Discharge to home or other facility with appropriate resources: Identify barriers to discharge with patient and caregiver  4/27/2024 0042 by Giselle Carrington RN  Outcome: Progressing     Problem: Skin/Tissue Integrity  Goal: Absence of new skin breakdown  Description: 1.  Monitor for areas of redness and/or skin breakdown  2.  Assess vascular access sites hourly  3.  Every 4-6 hours minimum:  Change oxygen saturation probe site  4.  Every 4-6 hours:  If on nasal continuous positive airway pressure, respiratory therapy assess nares and determine need for appliance change or resting period.  4/27/2024 1012 by Libia Mesa RN  Outcome: Progressing  4/27/2024 0042 by Giselle Carrington RN  Outcome: Not Progressing     Problem: Safety - Adult  Goal: Free from fall injury  4/27/2024 1012 by Libia Mesa RN  Outcome: Progressing  4/27/2024 0042 by Giselle Carrington RN  Outcome: Progressing     Problem: ABCDS Injury Assessment  Goal: Absence of physical injury  4/27/2024 1012 by Libia Mesa RN  Outcome: Progressing  4/27/2024 0042 by Giselle Carrington RN  Outcome: Progressing     Problem: Risk for Elopement  Goal: Patient will not exit the unit/facility without proper excort  4/27/2024 1012 by Libia Mesa RN  Outcome: Progressing  Flowsheets (Taken 4/27/2024 0800)  Nursing Interventions for Elopement Risk: Assist with personal care needs such as toileting, eating, dressing, as needed to reduce the risk of wandering  4/27/2024 0042 by Giselle Carrington RN  Outcome: Progressing  Flowsheets (Taken 4/26/2024 1200 by Nicki Sapp RN)  Nursing Interventions for Elopement Risk: Assist with personal care needs such as toileting, eating, dressing, as needed to reduce the risk of wandering     Problem: Pain  Goal: Verbalizes/displays 
  Problem: Discharge Planning  Goal: Discharge to home or other facility with appropriate resources  4/28/2024 1441 by Libia Mesa RN  Outcome: Progressing  Flowsheets (Taken 4/28/2024 0800)  Discharge to home or other facility with appropriate resources: Identify barriers to discharge with patient and caregiver  4/28/2024 0314 by Hilda Lees, RN  Outcome: Progressing  Flowsheets (Taken 4/27/2024 2000)  Discharge to home or other facility with appropriate resources:   Identify barriers to discharge with patient and caregiver   Arrange for needed discharge resources and transportation as appropriate   Identify discharge learning needs (meds, wound care, etc)   Refer to discharge planning if patient needs post-hospital services based on physician order or complex needs related to functional status, cognitive ability or social support system     Problem: Skin/Tissue Integrity  Goal: Absence of new skin breakdown  Description: 1.  Monitor for areas of redness and/or skin breakdown  2.  Assess vascular access sites hourly  3.  Every 4-6 hours minimum:  Change oxygen saturation probe site  4.  Every 4-6 hours:  If on nasal continuous positive airway pressure, respiratory therapy assess nares and determine need for appliance change or resting period.  4/28/2024 1441 by Libia Mesa RN  Outcome: Progressing  4/28/2024 0314 by Hilda Lees, RN  Outcome: Progressing     Problem: Safety - Adult  Goal: Free from fall injury  4/28/2024 1441 by Libia Mesa RN  Outcome: Progressing  4/28/2024 0314 by Hilda Lees, RN  Outcome: Progressing     Problem: ABCDS Injury Assessment  Goal: Absence of physical injury  4/28/2024 1441 by Libia Mesa, RN  Outcome: Progressing  4/28/2024 0314 by Hilda Lees, RN  Outcome: Progressing     Problem: Risk for Elopement  Goal: Patient will not exit the unit/facility without proper excort  4/28/2024 1441 by Libia Mesa RN  Outcome: Progressing  Flowsheets (Taken 
  Problem: Discharge Planning  Goal: Discharge to home or other facility with appropriate resources  4/29/2024 1532 by Adelita Wilson RN  Outcome: Progressing     Problem: Skin/Tissue Integrity  Goal: Absence of new skin breakdown  Description: 1.  Monitor for areas of redness and/or skin breakdown  2.  Assess vascular access sites hourly  3.  Every 4-6 hours minimum:  Change oxygen saturation probe site  4.  Every 4-6 hours:  If on nasal continuous positive airway pressure, respiratory therapy assess nares and determine need for appliance change or resting period.  4/29/2024 1532 by Adelita Wilson RN  Outcome: Progressing     Problem: Safety - Adult  Goal: Free from fall injury  4/29/2024 1532 by Adelita Wilson RN  Outcome: Progressing     Problem: ABCDS Injury Assessment  Goal: Absence of physical injury  4/29/2024 1532 by Adelita Wilson RN  Outcome: Progressing     Problem: Risk for Elopement  Goal: Patient will not exit the unit/facility without proper excort  4/29/2024 1532 by Adelita Wilson RN  Outcome: Completed  Flowsheets (Taken 4/29/2024 0830)  Nursing Interventions for Elopement Risk: Assist with personal care needs such as toileting, eating, dressing, as needed to reduce the risk of wandering     Problem: Pain  Goal: Verbalizes/displays adequate comfort level or baseline comfort level  4/29/2024 1532 by Adelita Wilson RN  Outcome: Progressing     Problem: Respiratory - Adult  Goal: Achieves optimal ventilation and oxygenation  4/29/2024 1532 by Adelita Wilson RN  Outcome: Progressing     Problem: Musculoskeletal - Adult  Goal: Return mobility to safest level of function  4/29/2024 1532 by Adelita Wilson RN  Outcome: Progressing     Problem: Musculoskeletal - Adult  Goal: Return ADL status to a safe level of function  Outcome: Progressing     Problem: Gastrointestinal - Adult  Goal: Maintains or returns to baseline bowel function  4/29/2024 1532 by Adelita Wilson RN  Outcome: Progressing   
  Problem: Discharge Planning  Goal: Discharge to home or other facility with appropriate resources  4/30/2024 1803 by Dominga Quigley RN  Outcome: Progressing  4/30/2024 1801 by Dominga Quigley RN  Outcome: Progressing  4/30/2024 0736 by Dominga Taylor RN  Outcome: Progressing     Problem: Skin/Tissue Integrity  Goal: Absence of new skin breakdown  Description: 1.  Monitor for areas of redness and/or skin breakdown  2.  Assess vascular access sites hourly  3.  Every 4-6 hours minimum:  Change oxygen saturation probe site  4.  Every 4-6 hours:  If on nasal continuous positive airway pressure, respiratory therapy assess nares and determine need for appliance change or resting period.  4/30/2024 1803 by Dominga Quigley RN  Outcome: Progressing  4/30/2024 1801 by Dominga Quigley RN  Outcome: Progressing  4/30/2024 0736 by Dominga Taylor RN  Outcome: Not Progressing     Problem: Safety - Adult  Goal: Free from fall injury  4/30/2024 1803 by Dominga Quigley RN  Outcome: Progressing  Note: Pt fall risk, fall band present, falling star, safety alarm activated and in use as needed. Hourly rounding performed. Pt encouraged to use call light. See Maier fall risk assessment. Will continue to monitor.  4/30/2024 1801 by Dominga Quigley RN  Outcome: Progressing  Note: Pt fall risk, fall band present, falling star, safety alarm activated and in use as needed. Hourly rounding performed. Pt encouraged to use call light. See Maier fall risk assessment.   4/30/2024 0736 by Dominga Taylor RN  Outcome: Progressing     Problem: ABCDS Injury Assessment  Goal: Absence of physical injury  4/30/2024 1803 by Dominga Quigley RN  Outcome: Progressing  4/30/2024 1801 by Dominga Quigley RN  Outcome: Progressing  4/30/2024 0736 by Dominga Taylor RN  Outcome: Progressing     Problem: Pain  Goal: Verbalizes/displays adequate comfort level or baseline comfort level  4/30/2024 1803 by Dominga Quigley RN  Outcome: Progressing  4/30/2024 
  Problem: Discharge Planning  Goal: Discharge to home or other facility with appropriate resources  5/1/2024 0506 by Lila García RN  Outcome: Progressing  Flowsheets (Taken 5/1/2024 0506)  Discharge to home or other facility with appropriate resources:   Identify barriers to discharge with patient and caregiver   Arrange for needed discharge resources and transportation as appropriate   Identify discharge learning needs (meds, wound care, etc)   Refer to discharge planning if patient needs post-hospital services based on physician order or complex needs related to functional status, cognitive ability or social support system  4/30/2024 1803 by Dominga Quigley RN  Outcome: Progressing  4/30/2024 1801 by Dominga Quigley RN  Outcome: Progressing     Problem: Skin/Tissue Integrity  Goal: Absence of new skin breakdown  Description: 1.  Monitor for areas of redness and/or skin breakdown  2.  Assess vascular access sites hourly  3.  Every 4-6 hours minimum:  Change oxygen saturation probe site  4.  Every 4-6 hours:  If on nasal continuous positive airway pressure, respiratory therapy assess nares and determine need for appliance change or resting period.  5/1/2024 0506 by Lila García RN  Outcome: Progressing  4/30/2024 1803 by Dominga Quigley RN  Outcome: Progressing  4/30/2024 1801 by Dominga Quigley RN  Outcome: Progressing     Problem: Safety - Adult  Goal: Free from fall injury  5/1/2024 0506 by Lila García RN  Outcome: Progressing  Flowsheets (Taken 4/29/2024 0648 by Cyndi Hobson, RN)  Free From Fall Injury: Instruct family/caregiver on patient safety  4/30/2024 1803 by Dominga Quigley RN  Outcome: Progressing  Note: Pt fall risk, fall band present, falling star, safety alarm activated and in use as needed. Hourly rounding performed. Pt encouraged to use call light. See [place] fall risk assessment. Will continue to monitor.  4/30/2024 1801 by Dominga Quigley RN  Outcome: Progressing  Note: 
  Problem: Discharge Planning  Goal: Discharge to home or other facility with appropriate resources  5/1/2024 1112 by Dominga Quigley RN  Outcome: Progressing  5/1/2024 0506 by Lila García RN  Outcome: Progressing  Flowsheets (Taken 5/1/2024 0506)  Discharge to home or other facility with appropriate resources:   Identify barriers to discharge with patient and caregiver   Arrange for needed discharge resources and transportation as appropriate   Identify discharge learning needs (meds, wound care, etc)   Refer to discharge planning if patient needs post-hospital services based on physician order or complex needs related to functional status, cognitive ability or social support system     Problem: Skin/Tissue Integrity  Goal: Absence of new skin breakdown  Description: 1.  Monitor for areas of redness and/or skin breakdown  2.  Assess vascular access sites hourly  3.  Every 4-6 hours minimum:  Change oxygen saturation probe site  4.  Every 4-6 hours:  If on nasal continuous positive airway pressure, respiratory therapy assess nares and determine need for appliance change or resting period.  5/1/2024 1112 by Dominga Quigley RN  Outcome: Progressing  5/1/2024 0506 by Lila García RN  Outcome: Progressing     Problem: Safety - Adult  Goal: Free from fall injury  5/1/2024 1112 by Dominga Quigley RN  Outcome: Progressing  Note: Pt fall risk, fall band present, falling star, safety alarm activated and in use as needed. Hourly rounding performed. Pt encouraged to use call light. See [place]ion fall risk assessment. Will continue to monitor  5/1/2024 0506 by Lila García RN  Outcome: Progressing  Flowsheets (Taken 4/29/2024 0648 by Cyndi Hobson, RN)  Free From Fall Injury: Instruct family/caregiver on patient safety     Problem: ABCDS Injury Assessment  Goal: Absence of physical injury  5/1/2024 1112 by Dominga Quigley RN  Outcome: Progressing  5/1/2024 0506 by Lila García RN  Outcome: 
  Problem: Discharge Planning  Goal: Discharge to home or other facility with appropriate resources  5/4/2024 2300 by Cyndi Hobson RN  Outcome: Progressing  Flowsheets (Taken 5/4/2024 2300)  Discharge to home or other facility with appropriate resources: Arrange for needed discharge resources and transportation as appropriate     Problem: Skin/Tissue Integrity  Goal: Absence of new skin breakdown  Description: 1.  Monitor for areas of redness and/or skin breakdown  2.  Assess vascular access sites hourly  3.  Every 4-6 hours minimum:  Change oxygen saturation probe site  4.  Every 4-6 hours:  If on nasal continuous positive airway pressure, respiratory therapy assess nares and determine need for appliance change or resting period.  5/4/2024 2300 by Cyndi Hobson RN  Outcome: Progressing     Problem: Safety - Adult  Goal: Free from fall injury  5/4/2024 2300 by Cyndi Hobson RN  Outcome: Progressing  Flowsheets (Taken 5/4/2024 2300)  Free From Fall Injury: Instruct family/caregiver on patient safety     Problem: ABCDS Injury Assessment  Goal: Absence of physical injury  5/4/2024 2300 by Cyndi Hobson RN  Outcome: Progressing  Flowsheets (Taken 5/4/2024 2300)  Absence of Physical Injury: Implement safety measures based on patient assessment     Problem: Pain  Goal: Verbalizes/displays adequate comfort level or baseline comfort level  5/4/2024 2300 by Cyndi Hobson RN  Outcome: Progressing  Flowsheets (Taken 5/4/2024 2300)  Verbalizes/displays adequate comfort level or baseline comfort level: Assess pain using appropriate pain scale     Problem: Respiratory - Adult  Goal: Achieves optimal ventilation and oxygenation  5/4/2024 2300 by Cyndi Hobson RN  Outcome: Progressing  Flowsheets (Taken 5/4/2024 2300)  Achieves optimal ventilation and oxygenation: Assess for changes in respiratory status     
  Problem: Discharge Planning  Goal: Discharge to home or other facility with appropriate resources  Outcome: Progressing     Problem: Safety - Adult  Goal: Free from fall injury  4/27/2024 0042 by Giselle Carrington RN  Outcome: Progressing  4/26/2024 1334 by Nicki Sapp RN  Outcome: Progressing     Problem: ABCDS Injury Assessment  Goal: Absence of physical injury  4/27/2024 0042 by Giselle Carrington RN  Outcome: Progressing  4/26/2024 1334 by Nicki Sapp RN  Outcome: Progressing  Flowsheets (Taken 4/26/2024 1334)  Absence of Physical Injury: Implement safety measures based on patient assessment     Problem: Risk for Elopement  Goal: Patient will not exit the unit/facility without proper excort  Outcome: Progressing  Flowsheets (Taken 4/26/2024 1200 by Nicki Sapp RN)  Nursing Interventions for Elopement Risk: Assist with personal care needs such as toileting, eating, dressing, as needed to reduce the risk of wandering     Problem: Pain  Goal: Verbalizes/displays adequate comfort level or baseline comfort level  4/27/2024 0042 by Giselle Carrington RN  Outcome: Progressing  4/26/2024 1334 by Nicki Sapp RN  Outcome: Progressing  Flowsheets (Taken 4/26/2024 1334)  Verbalizes/displays adequate comfort level or baseline comfort level:   Encourage patient to monitor pain and request assistance   Assess pain using appropriate pain scale   Administer analgesics based on type and severity of pain and evaluate response   Implement non-pharmacological measures as appropriate and evaluate response     Problem: Respiratory - Adult  Goal: Achieves optimal ventilation and oxygenation  4/27/2024 0042 by Giselle Carrington RN  Outcome: Progressing  4/26/2024 1334 by Nicki Sapp RN  Outcome: Progressing  Flowsheets (Taken 4/26/2024 1334)  Achieves optimal ventilation and oxygenation:   Assess for changes in respiratory status   Assess for changes in mentation and behavior   Position to 
  Problem: Discharge Planning  Goal: Discharge to home or other facility with appropriate resources  Outcome: Progressing     Problem: Skin/Tissue Integrity  Goal: Absence of new skin breakdown  Description: 1.  Monitor for areas of redness and/or skin breakdown  2.  Assess vascular access sites hourly  3.  Every 4-6 hours minimum:  Change oxygen saturation probe site  4.  Every 4-6 hours:  If on nasal continuous positive airway pressure, respiratory therapy assess nares and determine need for appliance change or resting period.  Outcome: Progressing     Problem: Safety - Adult  Goal: Free from fall injury  Outcome: Progressing     Problem: ABCDS Injury Assessment  Goal: Absence of physical injury  Outcome: Progressing     Problem: Risk for Elopement  Goal: Patient will not exit the unit/facility without proper excort  Outcome: Progressing     Problem: Pain  Goal: Verbalizes/displays adequate comfort level or baseline comfort level  Outcome: Progressing     
  Problem: Discharge Planning  Goal: Discharge to home or other facility with appropriate resources  Outcome: Progressing     Problem: Skin/Tissue Integrity  Goal: Absence of new skin breakdown  Description: 1.  Monitor for areas of redness and/or skin breakdown  2.  Assess vascular access sites hourly  3.  Every 4-6 hours minimum:  Change oxygen saturation probe site  4.  Every 4-6 hours:  If on nasal continuous positive airway pressure, respiratory therapy assess nares and determine need for appliance change or resting period.  Outcome: Progressing     Problem: Safety - Adult  Goal: Free from fall injury  Outcome: Progressing  Note: Pt fall risk, fall band present, falling star, safety alarm activated and in use as needed. Hourly rounding performed. Pt encouraged to use call light. See [place]ion fall risk assessment.        Problem: ABCDS Injury Assessment  Goal: Absence of physical injury  Outcome: Progressing     Problem: Risk for Elopement  Goal: Patient will not exit the unit/facility without proper excort  Outcome: Progressing     Problem: Pain  Goal: Verbalizes/displays adequate comfort level or baseline comfort level  Outcome: Progressing  Flowsheets (Taken 4/25/2024 0800 by Ela Harris RN)  Verbalizes/displays adequate comfort level or baseline comfort level: Assess pain using appropriate pain scale     Problem: Respiratory - Adult  Goal: Achieves optimal ventilation and oxygenation  Outcome: Progressing     Problem: Musculoskeletal - Adult  Goal: Return mobility to safest level of function  Outcome: Progressing  Goal: Return ADL status to a safe level of function  Outcome: Progressing     Problem: Gastrointestinal - Adult  Goal: Maintains or returns to baseline bowel function  Outcome: Progressing     Problem: Genitourinary - Adult  Goal: Absence of urinary retention  Outcome: Progressing  Goal: Urinary catheter remains patent  Outcome: Progressing     Problem: Infection - Adult  Goal: Absence of 
  Problem: Discharge Planning  Goal: Discharge to home or other facility with appropriate resources  Outcome: Progressing  Flowsheets (Taken 4/22/2024 1900)  Discharge to home or other facility with appropriate resources: Identify barriers to discharge with patient and caregiver     Problem: Skin/Tissue Integrity  Goal: Absence of new skin breakdown  Description: 1.  Monitor for areas of redness and/or skin breakdown  2.  Assess vascular access sites hourly  3.  Every 4-6 hours minimum:  Change oxygen saturation probe site  4.  Every 4-6 hours:  If on nasal continuous positive airway pressure, respiratory therapy assess nares and determine need for appliance change or resting period.  Outcome: Progressing     Problem: Safety - Adult  Goal: Free from fall injury  Outcome: Progressing  Flowsheets (Taken 4/22/2024 2041)  Free From Fall Injury: Instruct family/caregiver on patient safety     Problem: ABCDS Injury Assessment  Goal: Absence of physical injury  Outcome: Progressing  Flowsheets (Taken 4/22/2024 2041)  Absence of Physical Injury: Implement safety measures based on patient assessment     Problem: Risk for Elopement  Goal: Patient will not exit the unit/facility without proper excort  Outcome: Progressing  Flowsheets  Taken 4/22/2024 2000 by Owen Warner, RN  Nursing Interventions for Elopement Risk:   Assist with personal care needs such as toileting, eating, dressing, as needed to reduce the risk of wandering   Make sure patient has all necessary personal care items   Place patient in room far away from exits and stairways   Reduce environmental triggers  Taken 4/22/2024 1900 by Owen Warner, RN  Nursing Interventions for Elopement Risk:   Assist with personal care needs such as toileting, eating, dressing, as needed to reduce the risk of wandering   Make sure patient has all necessary personal care items   Place patient in room far away from exits and stairways   Reduce environmental triggers  Taken 
  Problem: Discharge Planning  Goal: Discharge to home or other facility with appropriate resources  Outcome: Progressing  Flowsheets (Taken 5/1/2024 2000)  Discharge to home or other facility with appropriate resources:   Identify barriers to discharge with patient and caregiver   Arrange for needed discharge resources and transportation as appropriate   Identify discharge learning needs (meds, wound care, etc)   Refer to discharge planning if patient needs post-hospital services based on physician order or complex needs related to functional status, cognitive ability or social support system     Problem: Skin/Tissue Integrity  Goal: Absence of new skin breakdown  Description: 1.  Monitor for areas of redness and/or skin breakdown  2.  Assess vascular access sites hourly  3.  Every 4-6 hours minimum:  Change oxygen saturation probe site  4.  Every 4-6 hours:  If on nasal continuous positive airway pressure, respiratory therapy assess nares and determine need for appliance change or resting period.  Outcome: Progressing     Problem: Safety - Adult  Goal: Free from fall injury  Outcome: Progressing     Problem: ABCDS Injury Assessment  Goal: Absence of physical injury  Outcome: Progressing     Problem: Risk for Elopement  Goal: Patient will not exit the unit/facility without proper excort  Recent Flowsheet Documentation  Taken 5/2/2024 0400 by Sunita Evans, RN  Nursing Interventions for Elopement Risk: Assist with personal care needs such as toileting, eating, dressing, as needed to reduce the risk of wandering  Taken 5/2/2024 0000 by Sunita Evans, RN  Nursing Interventions for Elopement Risk: Assist with personal care needs such as toileting, eating, dressing, as needed to reduce the risk of wandering  Taken 5/1/2024 2000 by Sunita Evans, RN  Nursing Interventions for Elopement Risk: Assist with personal care needs such as toileting, eating, dressing, as needed to reduce the risk of wandering  Taken 
  Problem: Genitourinary - Adult  Goal: Urinary catheter remains patent  Outcome: Progressing  Flowsheets (Taken 4/27/2024 2000)  Urinary catheter remains patent: Assess patency of urinary catheter     Problem: Skin/Tissue Integrity - Adult  Goal: Incisions, wounds, or drain sites healing without S/S of infection  Outcome: Progressing  Flowsheets (Taken 4/27/2024 2000)  Incisions, Wounds, or Drain Sites Healing Without Sign and Symptoms of Infection: ADMISSION and DAILY: Assess and document risk factors for pressure ulcer development     Problem: Musculoskeletal - Adult  Goal: Return ADL status to a safe level of function  Outcome: Progressing  Flowsheets (Taken 4/27/2024 2000)  Return ADL Status to a Safe Level of Function:   Administer medication as ordered   Assess activities of daily living deficits and provide assistive devices as needed     Problem: Metabolic/Fluid and Electrolytes - Adult  Goal: Electrolytes maintained within normal limits  Outcome: Progressing  Flowsheets (Taken 4/27/2024 2000)  Electrolytes maintained within normal limits:   Monitor labs and assess patient for signs and symptoms of electrolyte imbalances   Administer electrolyte replacement as ordered     
  Problem: Skin/Tissue Integrity  Goal: Absence of new skin breakdown  Description: 1.  Monitor for areas of redness and/or skin breakdown  2.  Assess vascular access sites hourly  3.  Every 4-6 hours minimum:  Change oxygen saturation probe site  4.  Every 4-6 hours:  If on nasal continuous positive airway pressure, respiratory therapy assess nares and determine need for appliance change or resting period.  4/26/2024 1334 by Nicki Sapp RN  Outcome: Progressing     Problem: Safety - Adult  Goal: Free from fall injury  4/26/2024 1334 by Nicki Sapp RN  Outcome: Progressing     Problem: ABCDS Injury Assessment  Goal: Absence of physical injury  4/26/2024 1334 by Nicki Sapp RN  Outcome: Progressing  Flowsheets (Taken 4/26/2024 1334)  Absence of Physical Injury: Implement safety measures based on patient assessment     Problem: Pain  Goal: Verbalizes/displays adequate comfort level or baseline comfort level  4/26/2024 1334 by Nicki Sapp RN  Outcome: Progressing  Flowsheets (Taken 4/26/2024 1334)  Verbalizes/displays adequate comfort level or baseline comfort level:   Encourage patient to monitor pain and request assistance   Assess pain using appropriate pain scale   Administer analgesics based on type and severity of pain and evaluate response   Implement non-pharmacological measures as appropriate and evaluate response     Problem: Respiratory - Adult  Goal: Achieves optimal ventilation and oxygenation  4/26/2024 1334 by Nicki Sapp RN  Outcome: Progressing  Flowsheets (Taken 4/26/2024 1334)  Achieves optimal ventilation and oxygenation:   Assess for changes in respiratory status   Assess for changes in mentation and behavior   Position to facilitate oxygenation and minimize respiratory effort   Oxygen supplementation based on oxygen saturation or arterial blood gases       
  Problem: Skin/Tissue Integrity  Goal: Absence of new skin breakdown  Description: 1.  Monitor for areas of redness and/or skin breakdown  2.  Assess vascular access sites hourly  3.  Every 4-6 hours minimum:  Change oxygen saturation probe site  4.  Every 4-6 hours:  If on nasal continuous positive airway pressure, respiratory therapy assess nares and determine need for appliance change or resting period.  Outcome: Not Progressing     Problem: Discharge Planning  Goal: Discharge to home or other facility with appropriate resources  Outcome: Progressing     Problem: Safety - Adult  Goal: Free from fall injury  Outcome: Progressing     Problem: ABCDS Injury Assessment  Goal: Absence of physical injury  Outcome: Progressing     Problem: Pain  Goal: Verbalizes/displays adequate comfort level or baseline comfort level  Outcome: Progressing     Problem: Respiratory - Adult  Goal: Achieves optimal ventilation and oxygenation  Outcome: Progressing     Problem: Musculoskeletal - Adult  Goal: Return mobility to safest level of function  Outcome: Progressing     Problem: Genitourinary - Adult  Goal: Absence of urinary retention  Outcome: Progressing     Problem: Genitourinary - Adult  Goal: Urinary catheter remains patent  Outcome: Progressing     Problem: Infection - Adult  Goal: Absence of infection at discharge  Outcome: Progressing     Problem: Metabolic/Fluid and Electrolytes - Adult  Goal: Electrolytes maintained within normal limits  Outcome: Progressing     Problem: Metabolic/Fluid and Electrolytes - Adult  Goal: Hemodynamic stability and optimal renal function maintained  Outcome: Progressing     Problem: Skin/Tissue Integrity - Adult  Goal: Incisions, wounds, or drain sites healing without S/S of infection  Outcome: Progressing     Problem: Nutrition Deficit:  Goal: Optimize nutritional status  Outcome: Progressing     Problem: Skin/Tissue Integrity  Goal: Absence of new skin breakdown  Description: 1.  Monitor for 
  Problem: Skin/Tissue Integrity  Goal: Absence of new skin breakdown  Description: 1.  Monitor for areas of redness and/or skin breakdown  2.  Assess vascular access sites hourly  3.  Every 4-6 hours minimum:  Change oxygen saturation probe site  4.  Every 4-6 hours:  If on nasal continuous positive airway pressure, respiratory therapy assess nares and determine need for appliance change or resting period.  Outcome: Progressing     Problem: Safety - Adult  Goal: Free from fall injury  Outcome: Progressing  Flowsheets (Taken 4/24/2024 1645)  Free From Fall Injury: Instruct family/caregiver on patient safety     Problem: ABCDS Injury Assessment  Goal: Absence of physical injury  Outcome: Progressing  Flowsheets (Taken 4/24/2024 1645)  Absence of Physical Injury: Implement safety measures based on patient assessment     Problem: Risk for Elopement  Goal: Patient will not exit the unit/facility without proper excort  Outcome: Progressing  Flowsheets  Taken 4/24/2024 1600  Nursing Interventions for Elopement Risk: Assist with personal care needs such as toileting, eating, dressing, as needed to reduce the risk of wandering  Taken 4/24/2024 1200  Nursing Interventions for Elopement Risk: Assist with personal care needs such as toileting, eating, dressing, as needed to reduce the risk of wandering  Taken 4/24/2024 0800  Nursing Interventions for Elopement Risk: Assist with personal care needs such as toileting, eating, dressing, as needed to reduce the risk of wandering     Problem: Pain  Goal: Verbalizes/displays adequate comfort level or baseline comfort level  Outcome: Progressing  Flowsheets  Taken 4/24/2024 1600  Verbalizes/displays adequate comfort level or baseline comfort level: Assess pain using appropriate pain scale  Taken 4/24/2024 0800  Verbalizes/displays adequate comfort level or baseline comfort level: Assess pain using appropriate pain scale     Problem: Respiratory - Adult  Goal: Achieves optimal 
Pt is A&O x4 and has been resting in bed.  VSS overnight.  Pt is on 3-4L NC. O2 sats WNL.  Pt is on lasix gtt at 20 mg/hr.  Pt has a smalls in place. See I&Os for details.  Pt has cellulitis/wound of lower right extremity.  Dressing change was done this AM.  See wound care orders.  Pt is receiving Ancef Q8 hours.  Pt is currently awaiting a bed at [facility].  Writer was updated by [employer] access overnight.  No bed available at this time.  Pt is on 1000 mL fluid restriction.  Pt had 150 mL of fluid since midnight.  Pt received a PRN dose of Morphine overnight for abd pain.  Patient having pain on their abdomen and rates it a 8. Pain interventions include frequent position changes, medication, and diversional activities. Patients goal for pain relief is 1. The need for pain and symptom management will be considered in the discharge planning process to ensure patients comfort.    Bed is locked in the lowest position and call light is in reach.    Problem: Discharge Planning  Goal: Discharge to home or other facility with appropriate resources  Outcome: Progressing     Problem: Skin/Tissue Integrity  Goal: Absence of new skin breakdown  Description: 1.  Monitor for areas of redness and/or skin breakdown  2.  Assess vascular access sites hourly  3.  Every 4-6 hours minimum:  Change oxygen saturation probe site  4.  Every 4-6 hours:  If on nasal continuous positive airway pressure, respiratory therapy assess nares and determine need for appliance change or resting period.  Outcome: Progressing     Problem: Safety - Adult  Goal: Free from fall injury  Outcome: Progressing     Problem: ABCDS Injury Assessment  Goal: Absence of physical injury  Outcome: Progressing     Problem: Pain  Goal: Verbalizes/displays adequate comfort level or baseline comfort level  Outcome: Progressing     Problem: Respiratory - Adult  Goal: Achieves optimal ventilation and oxygenation  Outcome: Progressing     Problem: Musculoskeletal - Adult  Goal: 
Administer analgesics based on type and severity of pain and evaluate response     Problem: Respiratory - Adult  Goal: Achieves optimal ventilation and oxygenation  Outcome: Progressing  Flowsheets (Taken 4/29/2024 0648)  Achieves optimal ventilation and oxygenation: Assess for changes in mentation and behavior     Problem: Musculoskeletal - Adult  Goal: Return mobility to safest level of function  Outcome: Progressing  Flowsheets (Taken 4/29/2024 0648)  Return Mobility to Safest Level of Function: Assess patient stability and activity tolerance for standing, transferring and ambulating with or without assistive devices     Problem: Genitourinary - Adult  Goal: Absence of urinary retention  Outcome: Progressing  Flowsheets (Taken 4/29/2024 0648)  Absence of urinary retention: Place urinary catheter per Licensed Independent Practitioner order if needed     Problem: Gastrointestinal - Adult  Goal: Maintains or returns to baseline bowel function  Outcome: Not Progressing  Flowsheets (Taken 4/29/2024 0648)  Maintains or returns to baseline bowel function: Assess bowel function     
Ela Harris RN  Verbalizes/displays adequate comfort level or baseline comfort level: Assess pain using appropriate pain scale     Problem: Respiratory - Adult  Goal: Achieves optimal ventilation and oxygenation  Recent Flowsheet Documentation  Taken 5/2/2024 0800 by Ela Harris RN  Achieves optimal ventilation and oxygenation: Assess for changes in respiratory status     Problem: Musculoskeletal - Adult  Goal: Return mobility to safest level of function  Recent Flowsheet Documentation  Taken 5/2/2024 0800 by Ela Harris RN  Return Mobility to Safest Level of Function: Assess patient stability and activity tolerance for standing, transferring and ambulating with or without assistive devices  Goal: Return ADL status to a safe level of function  Recent Flowsheet Documentation  Taken 5/2/2024 0800 by Ela Harris RN  Return ADL Status to a Safe Level of Function: Administer medication as ordered     Problem: Gastrointestinal - Adult  Goal: Maintains or returns to baseline bowel function  Recent Flowsheet Documentation  Taken 5/2/2024 0800 by Ela Harris RN  Maintains or returns to baseline bowel function: Assess bowel function     Problem: Genitourinary - Adult  Goal: Absence of urinary retention  Recent Flowsheet Documentation  Taken 5/2/2024 0800 by Ela Harris RN  Absence of urinary retention: Assess patient’s ability to void and empty bladder  Goal: Urinary catheter remains patent  Recent Flowsheet Documentation  Taken 5/2/2024 0800 by Ela Harris RN  Urinary catheter remains patent: Assess patency of urinary catheter     Problem: Infection - Adult  Goal: Absence of infection at discharge  Recent Flowsheet Documentation  Taken 5/2/2024 0800 by Ela Harris RN  Absence of infection at discharge: Assess and monitor for signs and symptoms of infection     Problem: Metabolic/Fluid and Electrolytes - Adult  Goal: Electrolytes maintained within normal 
healing without S/S of infection  5/4/2024 1120 by Moon Galarza, RN  Outcome: Progressing  Flowsheets (Taken 5/4/2024 0918)  Incisions, Wounds, or Drain Sites Healing Without Sign and Symptoms of Infection:   ADMISSION and DAILY: Assess and document risk factors for pressure ulcer development   TWICE DAILY: Assess and document skin integrity  5/3/2024 2337 by Chana Segovia, RN  Outcome: Progressing     Problem: Nutrition Deficit:  Goal: Optimize nutritional status  5/4/2024 1120 by Moon Galarza, RN  Outcome: Progressing  5/3/2024 2337 by Chana Segovia, RN  Outcome: Progressing  Flowsheets (Taken 5/3/2024 1446 by Beulah Pires, RD, LD)  Nutrient intake appropriate for improving, restoring, or maintaining nutritional needs:   Monitor oral intake, labs, and treatment plans   Assess nutritional status and recommend course of action

## 2024-05-06 ENCOUNTER — APPOINTMENT (OUTPATIENT)
Dept: INTERVENTIONAL RADIOLOGY/VASCULAR | Age: 54
DRG: 871 | End: 2024-05-06
Payer: COMMERCIAL

## 2024-05-06 VITALS
SYSTOLIC BLOOD PRESSURE: 111 MMHG | WEIGHT: 315 LBS | TEMPERATURE: 98.1 F | OXYGEN SATURATION: 99 % | RESPIRATION RATE: 17 BRPM | HEART RATE: 101 BPM | DIASTOLIC BLOOD PRESSURE: 63 MMHG | BODY MASS INDEX: 40.43 KG/M2 | HEIGHT: 74 IN

## 2024-05-06 LAB
ANION GAP SERPL CALCULATED.3IONS-SCNC: 16 MMOL/L (ref 9–17)
BASOPHILS # BLD: 0 K/UL (ref 0–0.2)
BASOPHILS NFR BLD: 0 %
BUN SERPL-MCNC: 133 MG/DL (ref 6–20)
BUN/CREAT SERPL: 27 (ref 9–20)
CALCIUM SERPL-MCNC: 9.5 MG/DL (ref 8.6–10.4)
CHLORIDE SERPL-SCNC: 87 MMOL/L (ref 98–107)
CO2 SERPL-SCNC: 23 MMOL/L (ref 20–31)
CREAT SERPL-MCNC: 4.9 MG/DL (ref 0.7–1.2)
EOSINOPHIL # BLD: 0.81 K/UL (ref 0–0.4)
EOSINOPHILS RELATIVE PERCENT: 3 % (ref 1–4)
ERYTHROCYTE [DISTWIDTH] IN BLOOD BY AUTOMATED COUNT: 16 % (ref 11.8–14.4)
GFR, ESTIMATED: 13 ML/MIN/1.73M2
GLUCOSE SERPL-MCNC: 93 MG/DL (ref 70–99)
HCT VFR BLD AUTO: 26.6 % (ref 40.7–50.3)
HGB BLD-MCNC: 9 G/DL (ref 13–17)
IMM GRANULOCYTES # BLD AUTO: 0.27 K/UL (ref 0–0.3)
IMM GRANULOCYTES NFR BLD: 1 %
LYMPHOCYTES NFR BLD: 0.81 K/UL (ref 1–4.8)
LYMPHOCYTES RELATIVE PERCENT: 3 % (ref 24–44)
MAGNESIUM SERPL-MCNC: 2.7 MG/DL (ref 1.6–2.6)
MCH RBC QN AUTO: 37.5 PG (ref 25.2–33.5)
MCHC RBC AUTO-ENTMCNC: 33.8 G/DL (ref 28.4–34.8)
MCV RBC AUTO: 110.8 FL (ref 82.6–102.9)
MICROORGANISM SPEC CULT: NORMAL
MICROORGANISM SPEC CULT: NORMAL
MICROORGANISM/AGENT SPEC: NORMAL
MONOCYTES NFR BLD: 11 % (ref 1–7)
MONOCYTES NFR BLD: 2.97 K/UL (ref 0.2–0.8)
MORPHOLOGY: ABNORMAL
NEUTROPHILS NFR BLD: 82 % (ref 36–66)
NEUTS SEG NFR BLD: 22.14 K/UL (ref 1.8–7.7)
NRBC BLD-RTO: 0 PER 100 WBC
PHOSPHATE SERPL-MCNC: 8.1 MG/DL (ref 2.5–4.5)
PLATELET # BLD AUTO: 130 K/UL (ref 138–453)
PMV BLD AUTO: 11.1 FL (ref 8.1–13.5)
POTASSIUM SERPL-SCNC: 5.2 MMOL/L (ref 3.7–5.3)
RBC # BLD AUTO: 2.4 M/UL (ref 4.21–5.77)
SODIUM SERPL-SCNC: 126 MMOL/L (ref 135–144)
SPECIMEN DESCRIPTION: NORMAL
SPECIMEN DESCRIPTION: NORMAL
WBC OTHER # BLD: 27 K/UL (ref 3.5–11.3)

## 2024-05-06 PROCEDURE — 02H633Z INSERTION OF INFUSION DEVICE INTO RIGHT ATRIUM, PERCUTANEOUS APPROACH: ICD-10-PCS | Performed by: FAMILY MEDICINE

## 2024-05-06 PROCEDURE — 36415 COLL VENOUS BLD VENIPUNCTURE: CPT

## 2024-05-06 PROCEDURE — 90945 DIALYSIS ONE EVALUATION: CPT

## 2024-05-06 PROCEDURE — 6370000000 HC RX 637 (ALT 250 FOR IP): Performed by: NURSE PRACTITIONER

## 2024-05-06 PROCEDURE — 80048 BASIC METABOLIC PNL TOTAL CA: CPT

## 2024-05-06 PROCEDURE — 2700000000 HC OXYGEN THERAPY PER DAY

## 2024-05-06 PROCEDURE — 2580000003 HC RX 258: Performed by: INTERNAL MEDICINE

## 2024-05-06 PROCEDURE — 2500000003 HC RX 250 WO HCPCS: Performed by: INTERNAL MEDICINE

## 2024-05-06 PROCEDURE — 76937 US GUIDE VASCULAR ACCESS: CPT

## 2024-05-06 PROCEDURE — 90935 HEMODIALYSIS ONE EVALUATION: CPT

## 2024-05-06 PROCEDURE — 6360000002 HC RX W HCPCS: Performed by: INTERNAL MEDICINE

## 2024-05-06 PROCEDURE — 83735 ASSAY OF MAGNESIUM: CPT

## 2024-05-06 PROCEDURE — 5A1D70Z PERFORMANCE OF URINARY FILTRATION, INTERMITTENT, LESS THAN 6 HOURS PER DAY: ICD-10-PCS | Performed by: FAMILY MEDICINE

## 2024-05-06 PROCEDURE — 6370000000 HC RX 637 (ALT 250 FOR IP): Performed by: INTERNAL MEDICINE

## 2024-05-06 PROCEDURE — 36556 INSERT NON-TUNNEL CV CATH: CPT

## 2024-05-06 PROCEDURE — P9047 ALBUMIN (HUMAN), 25%, 50ML: HCPCS | Performed by: INTERNAL MEDICINE

## 2024-05-06 PROCEDURE — 84100 ASSAY OF PHOSPHORUS: CPT

## 2024-05-06 PROCEDURE — 2580000003 HC RX 258: Performed by: UROLOGY

## 2024-05-06 PROCEDURE — 6370000000 HC RX 637 (ALT 250 FOR IP): Performed by: UROLOGY

## 2024-05-06 PROCEDURE — 85025 COMPLETE CBC W/AUTO DIFF WBC: CPT

## 2024-05-06 PROCEDURE — 94761 N-INVAS EAR/PLS OXIMETRY MLT: CPT

## 2024-05-06 PROCEDURE — 77001 FLUOROGUIDE FOR VEIN DEVICE: CPT

## 2024-05-06 PROCEDURE — 99232 SBSQ HOSP IP/OBS MODERATE 35: CPT | Performed by: INTERNAL MEDICINE

## 2024-05-06 PROCEDURE — 6360000002 HC RX W HCPCS: Performed by: RADIOLOGY

## 2024-05-06 PROCEDURE — 6370000000 HC RX 637 (ALT 250 FOR IP): Performed by: FAMILY MEDICINE

## 2024-05-06 PROCEDURE — C1894 INTRO/SHEATH, NON-LASER: HCPCS

## 2024-05-06 RX ORDER — ALBUMIN (HUMAN) 12.5 G/50ML
25 SOLUTION INTRAVENOUS
Status: COMPLETED | OUTPATIENT
Start: 2024-05-06 | End: 2024-05-06

## 2024-05-06 RX ORDER — HEPARIN SODIUM 1000 [USP'U]/ML
INJECTION, SOLUTION INTRAVENOUS; SUBCUTANEOUS PRN
Status: DISCONTINUED | OUTPATIENT
Start: 2024-05-06 | End: 2024-05-06 | Stop reason: HOSPADM

## 2024-05-06 RX ADMIN — PANTOPRAZOLE SODIUM 40 MG: 40 TABLET, DELAYED RELEASE ORAL at 08:56

## 2024-05-06 RX ADMIN — Medication 100 MG: at 08:56

## 2024-05-06 RX ADMIN — RIFAXIMIN 400 MG: 200 TABLET ORAL at 08:56

## 2024-05-06 RX ADMIN — Medication: at 16:39

## 2024-05-06 RX ADMIN — Medication 1.1 ML: at 17:17

## 2024-05-06 RX ADMIN — SODIUM ZIRCONIUM CYCLOSILICATE 5 G: 5 POWDER, FOR SUSPENSION ORAL at 08:57

## 2024-05-06 RX ADMIN — HEPARIN SODIUM 1300 UNITS: 1000 INJECTION INTRAVENOUS; SUBCUTANEOUS at 09:43

## 2024-05-06 RX ADMIN — ALBUMIN (HUMAN) 25 G: 0.25 INJECTION, SOLUTION INTRAVENOUS at 12:21

## 2024-05-06 RX ADMIN — Medication 1.2 ML: at 17:17

## 2024-05-06 RX ADMIN — DOCUSATE SODIUM 100 MG: 100 CAPSULE, LIQUID FILLED ORAL at 08:56

## 2024-05-06 RX ADMIN — POLYETHYLENE GLYCOL 3350 17 G: 17 POWDER, FOR SOLUTION ORAL at 08:56

## 2024-05-06 RX ADMIN — Medication 1.3 ML: at 13:10

## 2024-05-06 RX ADMIN — SODIUM CHLORIDE, PRESERVATIVE FREE 10 ML: 5 INJECTION INTRAVENOUS at 09:02

## 2024-05-06 RX ADMIN — FOLIC ACID 1 MG: 1 TABLET ORAL at 08:56

## 2024-05-06 RX ADMIN — PHENYLEPHRINE HYDROCHLORIDE 30 MCG/MIN: 10 INJECTION INTRAVENOUS at 12:50

## 2024-05-06 RX ADMIN — SPIRONOLACTONE 100 MG: 100 TABLET ORAL at 08:57

## 2024-05-06 RX ADMIN — MIDODRINE HYDROCHLORIDE 10 MG: 10 TABLET ORAL at 12:40

## 2024-05-06 ASSESSMENT — ENCOUNTER SYMPTOMS
RESPIRATORY NEGATIVE: 1
ABDOMINAL PAIN: 1

## 2024-05-06 NOTE — PROGRESS NOTES
Eliud Cunningham MD   Urology Progress Note            Subjective: Follow-up anasarca penoscrotal swelling    Patient Vitals for the past 24 hrs:   BP Temp Temp src Pulse Resp SpO2   05/01/24 0600 (!) 112/59 -- -- (!) 102 17 --   05/01/24 0400 129/68 98.1 °F (36.7 °C) Oral (!) 106 15 95 %   05/01/24 0326 -- -- -- -- 18 --   05/01/24 0256 -- -- -- -- 22 --   05/01/24 0200 (!) 121/57 -- -- (!) 103 16 --   05/01/24 0000 (!) 109/57 97.7 °F (36.5 °C) Temporal 99 16 94 %   04/30/24 2258 -- -- -- -- 16 --   04/30/24 2228 -- -- -- -- 20 --   04/30/24 2000 (!) 126/59 97.5 °F (36.4 °C) Temporal (!) 104 22 93 %   04/30/24 1624 -- 98.1 °F (36.7 °C) Oral -- -- --   04/30/24 1600 (!) 125/54 98.1 °F (36.7 °C) Oral (!) 104 20 94 %   04/30/24 1400 118/62 -- -- (!) 104 22 92 %   04/30/24 1247 (!) 119/58 98.9 °F (37.2 °C) Oral (!) 102 18 95 %   04/30/24 1133 -- 98.9 °F (37.2 °C) Oral -- -- --   04/30/24 0950 (!) 102/47 -- -- (!) 102 19 96 %   04/30/24 0915 111/68 -- -- (!) 106 20 94 %   04/30/24 0805 -- 98.6 °F (37 °C) Oral -- -- --   04/30/24 0800 108/65 98.6 °F (37 °C) Oral (!) 104 18 95 %       Intake/Output Summary (Last 24 hours) at 5/1/2024 0716  Last data filed at 5/1/2024 0300  Gross per 24 hour   Intake 1021.6 ml   Output 6250 ml   Net -5228.4 ml       Recent Labs     04/29/24  0704 04/30/24 0357 05/01/24 0413   WBC 32.3* 40.6* 36.9*   HGB 11.5* 11.2* 10.2*   HCT 34.2* 34.5* 30.1*   .6* 113.5* 109.5*   PLT 83* 88* 88*     Recent Labs     04/29/24  0704 04/30/24 0357 05/01/24 0413   * 131* 130*   K 3.9 4.0 3.7   CL 96* 97* 95*   CO2 29 25 28   PHOS 3.0 3.8 3.7   BUN 58* 66* 77*   CREATININE 0.9 1.1 1.2       No results for input(s): \"COLORU\", \"PHUR\", \"LABCAST\", \"WBCUA\", \"RBCUA\", \"MUCUS\", \"TRICHOMONAS\", \"YEAST\", \"BACTERIA\", \"CLARITYU\", \"SPECGRAV\", \"LEUKOCYTESUR\", \"UROBILINOGEN\", \"BILIRUBINUR\", \"BLOODU\" in the last 72 hours.    Invalid input(s): \"NITRATE\", 
                                Eliud Cunningham MD   Urology Progress Note            Subjective: Follow-up penoscrotal swelling,    Patient Vitals for the past 24 hrs:   BP Temp Temp src Pulse Resp SpO2   05/06/24 0400 (!) 95/45 97.1 °F (36.2 °C) Temporal 95 11 --   05/06/24 0000 (!) 99/48 97.1 °F (36.2 °C) Temporal (!) 102 17 --   05/05/24 2249 -- -- -- -- 16 --   05/05/24 2000 (!) 108/52 97 °F (36.1 °C) Temporal (!) 101 15 --   05/05/24 1946 -- -- -- 99 14 98 %   05/05/24 1836 -- -- -- -- 20 --   05/05/24 1806 -- -- -- (!) 102 20 --   05/05/24 1800 (!) 104/47 -- -- (!) 101 19 --   05/05/24 1700 (!) 98/52 -- -- 100 13 --   05/05/24 1600 (!) 112/55 98.2 °F (36.8 °C) Oral 100 15 98 %   05/05/24 1512 (!) 110/53 98.2 °F (36.8 °C) Oral (!) 105 20 93 %   05/05/24 1500 120/67 -- -- (!) 106 21 93 %   05/05/24 1400 (!) 111/54 -- -- (!) 105 21 93 %   05/05/24 1300 (!) 99/57 -- -- (!) 101 17 92 %   05/05/24 1200 (!) 107/58 98.7 °F (37.1 °C) Oral (!) 103 19 91 %   05/05/24 1146 102/69 98.7 °F (37.1 °C) Oral (!) 105 20 93 %   05/05/24 1100 138/60 -- -- (!) 106 21 92 %   05/05/24 1000 -- -- -- 100 14 100 %   05/05/24 0900 -- -- -- 100 14 96 %   05/05/24 0800 138/60 98.5 °F (36.9 °C) Temporal (!) 103 25 92 %   05/05/24 0746 -- 97.5 °F (36.4 °C) Temporal -- -- --       Intake/Output Summary (Last 24 hours) at 5/6/2024 0711  Last data filed at 5/5/2024 1848  Gross per 24 hour   Intake 308.55 ml   Output --   Net 308.55 ml       Recent Labs     05/04/24  0557 05/05/24  0552 05/06/24  0516   WBC 27.5* 25.9* 27.0*   HGB 9.3* 9.1* 9.0*   HCT 27.6* 27.2* 26.6*   .8* 110.6* 110.8*   * 125* 130*     Recent Labs     05/04/24  0557 05/05/24  0552 05/06/24  0516   * 129* 126*   K 4.1 4.6 5.2   CL 89* 90* 87*   CO2 26 22 23   PHOS 5.8* 6.9* 8.1*   * 121* 133*   CREATININE 2.8* 3.8* 4.9*       No results for input(s): \"COLORU\", \"PHUR\", \"LABCAST\", \"WBCUA\", \"RBCUA\", \"MUCUS\", \"TRICHOMONAS\", \"YEAST\", \"BACTERIA\", 
                                Eliud Cunningham MD   Urology Progress Note            Subjective: Follow-up scrotal swelling urinary retention    Patient Vitals for the past 24 hrs:   BP Temp Temp src Pulse Resp SpO2   04/25/24 0600 (!) 124/58 -- -- 83 13 94 %   04/25/24 0530 124/65 -- -- 82 14 97 %   04/25/24 0515 (!) 119/59 -- -- 81 14 97 %   04/25/24 0500 109/66 -- -- 83 14 97 %   04/25/24 0445 121/67 -- -- 82 14 --   04/25/24 0430 119/60 -- -- 85 14 97 %   04/25/24 0415 116/60 -- -- 86 13 99 %   04/25/24 0400 120/66 98.7 °F (37.1 °C) Oral 84 15 96 %   04/25/24 0345 120/67 -- -- 83 15 96 %   04/25/24 0330 114/60 -- -- 82 15 97 %   04/25/24 0315 (!) 114/56 -- -- 82 15 96 %   04/25/24 0300 106/60 -- -- 83 14 96 %   04/25/24 0245 119/75 -- -- 83 14 97 %   04/25/24 0230 112/60 -- -- 84 14 100 %   04/25/24 0200 115/69 -- -- 85 15 95 %   04/25/24 0130 114/62 -- -- 87 17 95 %   04/25/24 0100 125/77 -- -- 94 22 96 %   04/25/24 0030 (!) 112/57 -- -- 90 19 91 %   04/25/24 0000 (!) 99/59 98.2 °F (36.8 °C) Oral 86 17 96 %   04/24/24 2300 (!) 116/56 -- -- 88 19 95 %   04/24/24 2200 117/66 -- -- 85 14 96 %   04/24/24 2130 121/68 -- -- 85 14 95 %   04/24/24 2100 105/60 -- -- 84 16 95 %   04/24/24 2033 113/66 -- -- 86 17 95 %   04/24/24 2000 130/66 98.7 °F (37.1 °C) Oral 87 15 98 %   04/24/24 1945 128/77 -- -- 83 14 98 %   04/24/24 1930 118/69 -- -- 84 17 --   04/24/24 1900 112/69 -- -- 86 21 96 %   04/24/24 1635 -- -- -- 84 14 98 %   04/24/24 1630 102/80 -- -- 86 21 95 %   04/24/24 1625 -- 96.8 °F (36 °C) Temporal -- -- --   04/24/24 1620 -- -- -- 86 21 99 %   04/24/24 1615 122/67 -- -- 85 19 98 %   04/24/24 1605 -- -- -- 86 18 95 %   04/24/24 1600 127/64 -- -- 85 24 98 %   04/24/24 1550 -- -- -- 84 21 97 %   04/24/24 1545 117/65 -- -- 85 22 94 %   04/24/24 1535 -- -- -- 85 21 96 %   04/24/24 1530 132/74 -- -- 88 20 96 %   04/24/24 1520 -- -- -- 87 22 95 %   04/24/24 1515 129/77 -- -- 88 22 95 %   04/24/24 1505 -- -- -- 86 (!) 6 
                                Eliud Cunningham MD   Urology Progress Note            Subjective: Follow-up urinary retention bladder outlet obstruction    Patient Vitals for the past 24 hrs:   BP Temp Temp src Pulse Resp SpO2   04/24/24 0735 -- 96.8 °F (36 °C) Temporal -- -- --   04/24/24 0630 -- -- -- 83 12 97 %   04/24/24 0615 (!) 146/45 -- -- 83 11 96 %   04/24/24 0600 (!) 117/42 -- -- 85 12 97 %   04/24/24 0545 (!) 110/59 -- -- 82 13 97 %   04/24/24 0530 (!) 109/55 -- -- 82 12 95 %   04/24/24 0515 (!) 101/56 -- -- 86 12 98 %   04/24/24 0500 (!) 108/55 -- -- 85 12 96 %   04/24/24 0445 (!) 113/59 -- -- 87 16 96 %   04/24/24 0430 118/68 -- -- 85 13 97 %   04/24/24 0415 (!) 114/46 -- -- 84 15 97 %   04/24/24 0400 116/72 97.3 °F (36.3 °C) Temporal 85 11 97 %   04/24/24 0345 112/60 -- -- 86 16 95 %   04/24/24 0330 109/62 -- -- 85 12 95 %   04/24/24 0315 116/71 -- -- 86 15 92 %   04/24/24 0300 111/61 -- -- 84 12 97 %   04/24/24 0245 (!) 111/59 -- -- 85 12 95 %   04/24/24 0237 -- -- -- 84 12 94 %   04/24/24 0230 111/60 -- -- 87 13 92 %   04/24/24 0215 (!) 114/59 -- -- 86 14 97 %   04/24/24 0200 (!) 116/55 -- -- 87 12 96 %   04/24/24 0145 (!) 87/63 -- -- 86 14 95 %   04/24/24 0130 119/63 -- -- 88 13 94 %   04/24/24 0115 116/60 -- -- 87 13 95 %   04/24/24 0100 (!) 111/57 -- -- 87 13 93 %   04/24/24 0045 118/68 -- -- 93 19 94 %   04/24/24 0030 (!) 113/59 -- -- 88 13 97 %   04/24/24 0015 119/61 -- -- 89 13 97 %   04/24/24 0000 116/69 97.1 °F (36.2 °C) Temporal 88 14 97 %   04/23/24 2345 (!) 88/49 -- -- 87 14 97 %   04/23/24 2330 108/61 -- -- 87 (!) 5 95 %   04/23/24 2315 119/62 -- -- 90 14 94 %   04/23/24 2245 106/68 -- -- 87 19 91 %   04/23/24 2230 (!) 89/50 -- -- 88 23 90 %   04/23/24 2215 -- -- -- 88 18 95 %   04/23/24 2200 -- -- -- 89 21 90 %   04/23/24 2145 -- -- -- 90 19 93 %   04/23/24 2130 (!) 100/52 -- -- 86 17 92 %   04/23/24 2115 (!) 104/53 -- -- 88 19 93 %   04/23/24 2100 (!) 99/48 -- -- 86 14 96 %   04/23/24 2045 
                                Eliud Cunningham MD   Urology Progress Note            Subjective: Follow-up urinary retention penoscrotal swelling    Patient Vitals for the past 24 hrs:   BP Temp Temp src Pulse Resp SpO2   04/29/24 0600 116/78 -- -- (!) 102 22 94 %   04/29/24 0500 -- -- -- (!) 111 26 95 %   04/29/24 0400 (!) 125/57 97.3 °F (36.3 °C) Temporal (!) 102 19 93 %   04/29/24 0300 -- -- -- (!) 103 18 96 %   04/29/24 0200 136/71 -- -- (!) 104 18 95 %   04/29/24 0115 -- -- -- (!) 103 16 96 %   04/29/24 0100 -- -- -- (!) 105 18 95 %   04/29/24 0000 134/76 -- -- (!) 106 18 93 %   04/28/24 2300 -- -- -- (!) 107 19 94 %   04/28/24 2200 111/64 -- -- (!) 108 25 93 %   04/28/24 2100 -- -- -- (!) 106 23 92 %   04/28/24 2000 117/64 97.5 °F (36.4 °C) Temporal (!) 110 21 93 %   04/28/24 1800 134/68 -- -- (!) 105 18 97 %   04/28/24 1743 116/64 -- -- (!) 102 17 98 %   04/28/24 1626 -- 97.1 °F (36.2 °C) Temporal -- -- --   04/28/24 1600 118/63 -- -- 100 18 95 %   04/28/24 1400 119/71 -- -- (!) 103 17 98 %   04/28/24 1224 -- 97.3 °F (36.3 °C) Temporal -- -- --   04/28/24 0800 114/71 -- -- (!) 102 17 95 %   04/28/24 0700 -- 96.9 °F (36.1 °C) Temporal (!) 101 15 95 %       Intake/Output Summary (Last 24 hours) at 4/29/2024 0643  Last data filed at 4/28/2024 1900  Gross per 24 hour   Intake 832.39 ml   Output --   Net 832.39 ml       Recent Labs     04/26/24  1112 04/27/24  0359 04/28/24  0325   WBC 15.1* 16.7* 20.6*   HGB 10.4* 11.1* 11.0*   HCT 31.6* 33.8* 33.2*   .1* 111.6* 111.8*   PLT 58* 58* 63*     Recent Labs     04/26/24  1112 04/27/24  0359 04/28/24  0325    137 134*   K 4.0 3.9 3.7   CL 99 99 97*   CO2 31 31 29   PHOS  --  3.1 2.9   BUN 57* 54* 54*   CREATININE 1.1 0.9 0.8       No results for input(s): \"COLORU\", \"PHUR\", \"LABCAST\", \"WBCUA\", \"RBCUA\", \"MUCUS\", \"TRICHOMONAS\", \"YEAST\", \"BACTERIA\", \"CLARITYU\", \"SPECGRAV\", \"LEUKOCYTESUR\", \"UROBILINOGEN\", \"BILIRUBINUR\", \"BLOODU\" in the last 72 hours.    Invalid 
                                Eliud Cunningham MD   Urology Progress Note            Subjective: Follow-up urinary retention penoscrotal swelling    Patient Vitals for the past 24 hrs:   BP Temp Temp src Pulse Resp SpO2   05/01/24 0600 (!) 112/59 -- -- (!) 102 17 --   05/01/24 0400 129/68 98.1 °F (36.7 °C) Oral (!) 106 15 95 %   05/01/24 0326 -- -- -- -- 18 --   05/01/24 0256 -- -- -- -- 22 --   05/01/24 0200 (!) 121/57 -- -- (!) 103 16 --   05/01/24 0000 (!) 109/57 97.7 °F (36.5 °C) Temporal 99 16 94 %   04/30/24 2258 -- -- -- -- 16 --   04/30/24 2228 -- -- -- -- 20 --   04/30/24 2000 (!) 126/59 97.5 °F (36.4 °C) Temporal (!) 104 22 93 %   04/30/24 1624 -- 98.1 °F (36.7 °C) Oral -- -- --   04/30/24 1600 (!) 125/54 98.1 °F (36.7 °C) Oral (!) 104 20 94 %   04/30/24 1400 118/62 -- -- (!) 104 22 92 %   04/30/24 1247 (!) 119/58 98.9 °F (37.2 °C) Oral (!) 102 18 95 %   04/30/24 1133 -- 98.9 °F (37.2 °C) Oral -- -- --   04/30/24 0950 (!) 102/47 -- -- (!) 102 19 96 %   04/30/24 0915 111/68 -- -- (!) 106 20 94 %   04/30/24 0805 -- 98.6 °F (37 °C) Oral -- -- --   04/30/24 0800 108/65 98.6 °F (37 °C) Oral (!) 104 18 95 %       Intake/Output Summary (Last 24 hours) at 5/1/2024 0717  Last data filed at 5/1/2024 0300  Gross per 24 hour   Intake 1021.6 ml   Output 6250 ml   Net -5228.4 ml       Recent Labs     04/29/24  0704 04/30/24 0357 05/01/24 0413   WBC 32.3* 40.6* 36.9*   HGB 11.5* 11.2* 10.2*   HCT 34.2* 34.5* 30.1*   .6* 113.5* 109.5*   PLT 83* 88* 88*     Recent Labs     04/29/24  0704 04/30/24 0357 05/01/24 0413   * 131* 130*   K 3.9 4.0 3.7   CL 96* 97* 95*   CO2 29 25 28   PHOS 3.0 3.8 3.7   BUN 58* 66* 77*   CREATININE 0.9 1.1 1.2       No results for input(s): \"COLORU\", \"PHUR\", \"LABCAST\", \"WBCUA\", \"RBCUA\", \"MUCUS\", \"TRICHOMONAS\", \"YEAST\", \"BACTERIA\", \"CLARITYU\", \"SPECGRAV\", \"LEUKOCYTESUR\", \"UROBILINOGEN\", \"BILIRUBINUR\", \"BLOODU\" in the last 72 hours.    Invalid input(s): \"NITRATE\", 
                                Eliud Cunningham MD   Urology Progress Note            Subjective: Follow-up urinary retention scrotal swelling    Patient Vitals for the past 24 hrs:   BP Temp Temp src Pulse Resp SpO2   04/27/24 0737 -- 97.3 °F (36.3 °C) Temporal -- -- --   04/27/24 0400 -- 97.7 °F (36.5 °C) Temporal (!) 102 15 98 %   04/27/24 0352 -- -- -- (!) 101 14 98 %   04/27/24 0000 129/68 97.5 °F (36.4 °C) Temporal (!) 101 17 94 %   04/26/24 2000 108/69 97.3 °F (36.3 °C) Temporal 100 14 98 %   04/26/24 1640 131/65 97.8 °F (36.6 °C) Oral 100 14 99 %   04/26/24 1541 -- 97.8 °F (36.6 °C) Oral -- -- --   04/26/24 1200 131/79 98 °F (36.7 °C) Oral 97 17 97 %   04/26/24 1145 -- 98 °F (36.7 °C) Oral -- -- --   04/26/24 1100 111/74 -- -- (!) 102 13 --   04/26/24 1000 133/74 -- -- (!) 103 20 --   04/26/24 0953 -- -- -- -- 20 --   04/26/24 0900 137/68 -- -- (!) 104 22 --       Intake/Output Summary (Last 24 hours) at 4/27/2024 0847  Last data filed at 4/27/2024 0614  Gross per 24 hour   Intake 1183.04 ml   Output 1625 ml   Net -441.96 ml       Recent Labs     04/25/24  0340 04/26/24  1112 04/27/24  0359   WBC 25.2* 15.1* 16.7*   HGB 9.9* 10.4* 11.1*   HCT 29.6* 31.6* 33.8*   .2* 110.1* 111.6*   PLT 86* 58* 58*     Recent Labs     04/25/24  0340 04/26/24  0347 04/26/24  1112 04/27/24  0359   *  --  136 137   K 4.0  --  4.0 3.9   CL 95*  --  99 99   CO2 29  --  31 31   PHOS 4.5 3.5  --  3.1   BUN 60*  --  57* 54*   CREATININE 2.2*  --  1.1 0.9       No results for input(s): \"COLORU\", \"PHUR\", \"LABCAST\", \"WBCUA\", \"RBCUA\", \"MUCUS\", \"TRICHOMONAS\", \"YEAST\", \"BACTERIA\", \"CLARITYU\", \"SPECGRAV\", \"LEUKOCYTESUR\", \"UROBILINOGEN\", \"BILIRUBINUR\", \"BLOODU\" in the last 72 hours.    Invalid input(s): \"NITRATE\", \"GLUCOSEUKETONESUAMORPHOUS\"    Additional Lab/culture results:    Physical Exam: Patient seen in conjunction with nursing staff family at bedside patient is little more lethargic today, aggressive diuresis in progress the 
         [facility]     DAILY PROGRESS NOTE      Patient:   Demarco Wilson   :    1970   Date:     2024  Consultant:   Jaime Caraballo DO FACG    Subjective:     53 y.o. male admitted 2024 with Hyperkalemia [E87.5]  MACARIO (acute kidney injury) (HCC) [N17.9]  Cellulitis of right lower extremity [L03.115]  Pneumonia of right lower lobe due to infectious organism [J18.9]  Pneumonia due to infectious organism [J18.9] and seen for ***.    Current Medications include:   Scheduled Meds:   sodium zirconium cyclosilicate  5 g Oral BID    spironolactone  100 mg Oral Daily    albumin human 25%  25 g IntraVENous Q6H    polyethylene glycol  17 g Oral Daily    docusate sodium  100 mg Oral BID    rifAXIMin  400 mg Oral TID    ceFAZolin  2,000 mg IntraVENous Q8H    folic acid  1 mg Oral Daily    thiamine mononitrate  100 mg Oral Daily    [Held by provider] heparin (porcine)  5,000 Units SubCUTAneous BID    sodium chloride flush  5-40 mL IntraVENous 2 times per day    dextrose bolus  250 mL IntraVENous Once    glucagon (rDNA)  1 mg IntraMUSCular Once     Continuous Infusions:   furosemide (LASIX) 100 mg in sodium chloride 0.9 % 100 mL infusion 20 mg/hr (24 0051)    sodium chloride 5 mL/hr at 24 1847    dextrose       PRN Meds:.morphine, sodium chloride flush, calcium carbonate, sodium chloride flush, sodium chloride, acetaminophen, ondansetron **OR** ondansetron, glucose, dextrose bolus **OR** dextrose bolus, glucagon (rDNA), dextrose    Allergies: No Known Allergies    Objective:   Vital Signs:  Temp (24hrs), Av.7 °F (36.5 °C), Min:97 °F (36.1 °C), Max:98.4 °F (36.9 °C)     Systolic (24hrs), Av , Min:103 , Max:137      Diastolic (24hrs), Av, Min:59, Max:85     Pulse  Av.6  Min: 95  Max: 112  /72   Pulse (!) 101   Temp 97.9 °F (36.6 °C) (Axillary)   Resp 21   Ht 1.88 m (6' 2\")   Wt (!) 143.1 kg (315 lb 7.7 oz)   SpO2 96%   BMI 40.50 kg/m²  
         [facility]     DAILY PROGRESS NOTE      Patient:   Demarco Wilson   :    1970   Date:     5/3/2024  Consultant:   Jaime Caraballo DO FACG    Subjective:     53 y.o. male admitted 2024 with Hyperkalemia [E87.5]  MACARIO (acute kidney injury) (HCC) [N17.9]  Cellulitis of right lower extremity [L03.115]  Pneumonia of right lower lobe due to infectious organism [J18.9]  Pneumonia due to infectious organism [J18.9] and seen for end-stage liver disease.  Patient admitted nearly 2 weeks ago because of anasarca as well as edema and decompensated liver disease.  He has acute kidney injury and found to have E. coli sepsis.  Renal and infectious diseases on board.  Paracentesis performed twice without evidence of SBP.  Etiology of E. coli sepsis not clear.  Plans for transfer to [facility] because of decompensated liver disease as well as trying to manage renal function.  I am told that while a bed is available, it will take an additional 3 to 5 days to confirm with insurance.  Patient is otherwise stable.  He is really not eating much.  Mentally he is clear.  I understand he is supposed to go for another paracentesis with last completed 2 days ago.    Current Medications include:   Scheduled Meds:   sodium zirconium cyclosilicate  5 g Oral BID    [START ON 2024] spironolactone  100 mg Oral Daily    albumin human 25%  25 g IntraVENous Q6H    polyethylene glycol  17 g Oral Daily    docusate sodium  100 mg Oral BID    rifAXIMin  400 mg Oral TID    ceFAZolin  2,000 mg IntraVENous Q8H    folic acid  1 mg Oral Daily    thiamine mononitrate  100 mg Oral Daily    [Held by provider] heparin (porcine)  5,000 Units SubCUTAneous BID    sodium chloride flush  5-40 mL IntraVENous 2 times per day    dextrose bolus  250 mL IntraVENous Once    glucagon (rDNA)  1 mg IntraMUSCular Once     Continuous Infusions:   furosemide (LASIX) 100 mg in sodium chloride 0.9 % 100 mL infusion 20 
         [facility]     DAILY PROGRESS NOTE      Patient:   Demarco Wilson   :    1970   Date:     2024  Consultant:   Jaime Caraballo DO FACG    Subjective:     53 y.o. male admitted 2024 with Hyperkalemia [E87.5]  MACARIO (acute kidney injury) (HCC) [N17.9]  Cellulitis of right lower extremity [L03.115]  Pneumonia of right lower lobe due to infectious organism [J18.9]  Pneumonia due to infectious organism [J18.9] and seen for end-stage liver disease.  Patient had an uneventful night.  He continues to have the same problems including anasarca, acute kidney injury, electrolyte disturbances.  Mentally he has remained clear.  He still has not had a bowel movement.  He admits he is not eating much at all.  He is not on any nutritional supplements.  He is on 1000 cc fluid restriction.  Ongoing antibiotic management per ID.  Awaiting transfer to [facility] once cleared..    Current Medications include:   Scheduled Meds:   sodium zirconium cyclosilicate  5 g Oral BID    spironolactone  100 mg Oral Daily    albumin human 25%  25 g IntraVENous Q6H    polyethylene glycol  17 g Oral Daily    docusate sodium  100 mg Oral BID    rifAXIMin  400 mg Oral TID    ceFAZolin  2,000 mg IntraVENous Q8H    folic acid  1 mg Oral Daily    thiamine mononitrate  100 mg Oral Daily    [Held by provider] heparin (porcine)  5,000 Units SubCUTAneous BID    sodium chloride flush  5-40 mL IntraVENous 2 times per day    dextrose bolus  250 mL IntraVENous Once    glucagon (rDNA)  1 mg IntraMUSCular Once     Continuous Infusions:   furosemide (LASIX) 100 mg in sodium chloride 0.9 % 100 mL infusion 20 mg/hr (24 0051)    sodium chloride 5 mL/hr at 24 1847    dextrose       PRN Meds:.morphine, sodium chloride flush, calcium carbonate, sodium chloride flush, sodium chloride, acetaminophen, ondansetron **OR** ondansetron, glucose, dextrose bolus **OR** dextrose bolus, glucagon (rDNA), 
        Gastroenterology Progress Note      Patient:   Demarco Wilson   :    1970   Facility:   [facility]  Date:     2024  Consultant:   MAYCO OLVERA      Subjective:     Patient seen and examined.   Wife at bedside.   On Lasix gtt.     Objective:   Vital Signs:  /67   Pulse (!) 105   Temp 97.6 °F (36.4 °C) (Oral)   Resp 19   Ht 1.88 m (6' 2\")   Wt (!) 143.1 kg (315 lb 7.7 oz)   SpO2 96%   BMI 40.50 kg/m² linically about the     Physical Exam:   General appearance: Alert, NAD  Lungs: CTA bilaterally    Heart:S1S2 mildly tachycardic  Abdomen: Soft, obese, +ascites, +anasarca  Skin:  + jaundice, + stigmata of chronic liver disease  Ext: significant BLE, scrotal edema.     Lab and Imaging Review   CBC:   Lab Results   Component Value Date/Time    WBC 37.4 2024 04:36 AM    RBC 2.69 2024 04:36 AM    HGB 10.0 2024 04:36 AM    HCT 29.7 2024 04:36 AM    .4 2024 04:36 AM    MCH 37.2 2024 04:36 AM    MCHC 33.7 2024 04:36 AM    RDW 14.9 2024 04:36 AM    PLT 96 2024 04:36 AM    MPV 11.3 2024 04:36 AM     WBC:    Lab Results   Component Value Date/Time    WBC 37.4 2024 04:36 AM     Platelets:    Lab Results   Component Value Date/Time    PLT 96 2024 04:36 AM     Hemoglobin/Hematocrit:    Lab Results   Component Value Date/Time    HGB 10.0 2024 04:36 AM    HCT 29.7 2024 04:36 AM     CMP:    Lab Results   Component Value Date/Time     2024 04:36 AM    K 3.9 2024 04:36 AM    CL 92 2024 04:36 AM    CO2 26 2024 04:36 AM    BUN 85 2024 04:36 AM    CREATININE 1.4 2024 04:36 AM    LABGLOM 60 2024 04:36 AM    LABGLOM 80 2024 03:57 AM    GLUCOSE 99 2024 04:36 AM    CALCIUM 9.2 2024 04:36 AM    BILITOT 4.8 2024 03:40 AM    ALKPHOS 72 2024 03:40 AM    AST 39 2024 03:40 AM    ALT 35 2024 03:40 AM     BMP:    Lab Results 
        Gastroenterology Progress Note      Patient:   Demarco Wilson   :    1970   [facility]  Date:     2024  Consultant:   MAYCO OLVERA      Subjective:     Patient seen and examined.   Wife at bedside, did better today with OT/PT, spirits a bit better.   Seems to again have accumulated significant degree of ascites.   Fluid studies reviewed, culture pending, ID remains on board.   No overt blood loss.     Objective:   Vital Signs:  /65   Pulse (!) 104   Temp 97.5 °F (36.4 °C) (Temporal)   Resp 18   Ht 1.88 m (6' 2\")   Wt (!) 140.9 kg (310 lb 10.1 oz)   SpO2 93%   BMI 39.88 kg/m² linically about the     Physical Exam:   General appearance: Alert, NAD  Lungs: CTA bilaterally    Heart:S1S2 mildly tachycardic  Abdomen: Soft, obese, +ascites, +anasarca  Skin:  + jaundice, + stigmata of chronic liver disease  Ext: significant BLE, scrotal edema.     Lab and Imaging Review   CBC:   Lab Results   Component Value Date/Time    WBC 36.9 2024 04:13 AM    RBC 2.75 2024 04:13 AM    HGB 10.2 2024 04:13 AM    HCT 30.1 2024 04:13 AM    .5 2024 04:13 AM    MCH 37.1 2024 04:13 AM    MCHC 33.9 2024 04:13 AM    RDW 14.9 2024 04:13 AM    PLT 88 2024 04:13 AM    MPV 11.4 2024 04:13 AM     WBC:    Lab Results   Component Value Date/Time    WBC 36.9 2024 04:13 AM     Platelets:    Lab Results   Component Value Date/Time    PLT 88 2024 04:13 AM     Hemoglobin/Hematocrit:    Lab Results   Component Value Date/Time    HGB 10.2 2024 04:13 AM    HCT 30.1 2024 04:13 AM     CMP:    Lab Results   Component Value Date/Time     2024 04:13 AM    K 3.7 2024 04:13 AM    CL 95 2024 04:13 AM    CO2 28 2024 04:13 AM    BUN 77 2024 04:13 AM    CREATININE 1.2 2024 04:13 AM    GLUCOSE 112 2024 04:13 AM    CALCIUM 8.7 2024 04:13 AM    BILITOT 4.8 2024 
       GASTROENTEROLOGY NOTE       Patient:   Demarco Wilson   :    1970   [facility]ility:   [facility]  Date:     2024  Consultant:   Hilary Hsu, SLOANE - CNP      SUBJECTIVE:    Patient complaining of acid reflux, terrible throughout the night.  Was up several times with nausea and vomiting secondary to reflux.  Appetite remains poor, only able to tolerate nutritional supplements temporarily before vomiting.  Complains of diffuse abdominal pain and fullness.  Mentally clear.         OBJECTIVE:   Vital Signs:  /66   Pulse (!) 103   Temp 97.5 °F (36.4 °C) (Temporal)   Resp 27   Ht 1.88 m (6' 2\")   Wt (!) 143.1 kg (315 lb 7.7 oz)   SpO2 93%   BMI 40.50 kg/m²      Physical Exam:   General appearance: Alert, NAD, chronically ill  Lungs: CTA bilaterally, unlabored pattern  Heart: S1S2, RRR  Abdomen: Distended, generalized tenderness noted, ascites  Skin/Musculoskeletal:  Jaundice noted      Lab and Imaging Review   Recent Labs     24  0527 24  0939 24  0557 24  0552   WBC 32.6*  --  27.5* 25.9*   HGB 10.0*  --  9.3* 9.1*   .5*  --  110.8* 110.6*   PLT 94*  --  117* 125*   *  --  128* 129*   K 4.8  --  4.1 4.6   CL 92*  --  89* 90*   CO2 25  --  26 22   BUN 95*  --  106* 121*   CREATININE 1.7*  --  2.8* 3.8*   GLUCOSE 107*  --  123* 118*   CALCIUM 9.4  --  9.6 9.6   AST  --  31  --   --    ALT  --  <5*  --   --    ALKPHOS  --  71  --   --    BILITOT  --  9.6*  --   --    BILIDIR  --  4.5*  --   --    MG 2.4  --  2.5 2.7*     No results for input(s): \"INR\", \"PROTIME\" in the last 72 hours.      Impression:    Decompensated end-stage liver disease  MACARIO, with worsening renal function  Fluid restriction  Ascites  Acid reflux, with n/v      PLAN:    Continue Xifaxan, stop Lactulose  MiraLax daily  Diuretics managed per nephrology  Antibiotics managed per ID  Continue nutritional supplements  Start PPI therapy, Protonix 40mg daily    Hilary Hsu, CNP    
       GASTROENTEROLOGY NOTE       Patient:   Dmearco Wilson   :    1970   Facility:   [facility]  [place]  Date:     2024  Consultant:   MAYCO OLVERA      SUBJECTIVE:    Patient seen and examined, wife at bedside.   Plans noted to start dialysis today at bedside.  Nephrology d/c aldactone.   No vomiting today, +nausea.   Leaking of peritoneal fluid from prior tap sites      OBJECTIVE:   Vital Signs:  BP (!) 112/54   Pulse (!) 103   Temp 97.8 °F (36.6 °C) (Oral)   Resp 20   Ht 1.88 m (6' 2\")   Wt (!) 143.1 kg (315 lb 7.7 oz)   SpO2 91%   BMI 40.50 kg/m²      Physical Exam:   General appearance: Alert, NAD, muscle wasting, no asterixis  Lungs: diminished bases  Heart: S1S2, RRR  Abdomen: Distended, tense ascites/anasarca  Skin/Musculoskeletal:  Jaundice noted  Ext: significant BLE, weeping, scrotal edema      Lab and Imaging Review   Recent Labs     24  0557 24  0552 24  0516   WBC 27.5* 25.9* 27.0*   HGB 9.3* 9.1* 9.0*   .8* 110.6* 110.8*   * 125* 130*   * 129* 126*   K 4.1 4.6 5.2   CL 89* 90* 87*   CO2 26 22 23   * 121* 133*   CREATININE 2.8* 3.8* 4.9*   GLUCOSE 123* 118* 93   CALCIUM 9.6 9.6 9.5   MG 2.5 2.7* 2.7*     No results for input(s): \"INR\", \"PROTIME\" in the last 72 hours.      Impression:    Decompensated end-stage liver disease  MACARIO, with worsening renal function, plans to start dialysis today noted.   Fluid restriction as per Nephrology  Ascites, patient has some slow leaking of peritoneal fluid at previous tap site, will hold on paracentesis today, but consider low volume paracentesis tomorrow after evaluating patient.   Acid reflux, with n/v, in part related to uremia      PLAN:    Continue Xifaxan, lactulose on hold  MiraLax daily  Diuretics managed per nephrology  Antibiotics managed per ID  Continue nutritional supplements as able, hopefully uremia will improve after dialysis and PO intake with improve.   Start PPI therapy, 
  [facility]  Progress Note    Demarco Wilson  MRN: [id number]  Date: 4/26/2024  LOS: 4     Reason for F/U :   E coli sepsis, RLE cellulitis    Impression :   E. coli sepsis, source not entirely clear  Right lower extremity cellulitis following a traumatic injury to the right foot  Acute hypoxic respiratory insufficiency, currently requiring centimeters of oxygen per nasal cannula  Acute kidney injury  Right-sided pleural effusion  Status post thoracentesis with 950 mL of hari-colored fluid removed 4/22/2024  Alcoholic cirrhosis of the liver with ascites  Urethral stricture, status post cystoscopy with urethral dilatation and Cifuentes catheter placement 4/23/2024  Alcohol abuse  Sober since September 2023  Splenomegaly  Essential hypertension  Recent septic left knee prepatellar bursitis secondary to methicillin-resistant Staphylococcus aureus  Patient underwent left knee prepatellar irrigation and debridement with bursectomy 9/29/2023, completed a course of linezolid  Morbid obesity    Recommendations:   The patient continues on IV cefepime to cover for Pseudomonas causing the right lower extremity cellulitis.  This will also provide coverage for the E. coli bacteremia  The source of the e coli bacteremia is not entirely clear, the urine culture was negative though the patient did have urinary retention and the urine culture was not sent until after the antibiotics were initiated, and the CT abdomen and pelvis is essentially unremarkable for a possible source of infection  The right lower extremity cellulitis had been evolving since Monday as expected, though today it seems fairly similar to yesterday. I do not see any new blister formations from yesterday.  Explained to him again that his lower extremities remain quite edematous and I suspect this has also contributed to the worsening presentation of the right lower extremity and the blistering, and since diuresis has been started, hopefully this will 
  [facility]  Progress Note    Demarco Wilson  MRN: [id number]  Date: 5/5/2024  LOS: 13     Reason for F/U :   E. coli sepsis, cellulitis    Impression :   E. coli sepsis, source not entirely clear  Right lower extremity cellulitis following a traumatic injury to the right foot  The patient has an open wound in the right lateral leg and foot  Acute hypoxic respiratory insufficiency, currently requiring  oxygen per nasal cannula  Acute kidney injury  Right-sided pleural effusion  Status post thoracentesis with 950 mL of hari-colored fluid removed 4/22/2024  Alcoholic cirrhosis of the liver with ascites and splenomegaly  Sober since September 2023  Status post paracentesis 4/30/2024 and fluid studies not consistent with SBP  Urethral stricture  status post cystoscopy with urethral dilatation and Cifuentes catheter placement 4/23/2024  Essential hypertension  Morbid obesity  Anasarca  History of septic left knee prepatellar bursitis secondary to methicillin-resistant Staphylococcus aureus  Patient underwent left knee prepatellar irrigation and debridement with bursectomy 9/29/2023, completed a course of linezolid    Recommendations:   The patient continues on intravenous antimicrobial therapy with cefazolin for the E. coli isolated in the blood and this will continue through 5/5/2024 to complete a 14-day course of therapy  The leukocytosis is overall improved  The repeat blood culture data thus far remains negative  The right lower extremity changes overall seem to be improving and the major issue at this point in time is the significant anasarca  The patient has not responded well to diuretic therapy and is being considered for hemodialysis tomorrow    Infection Control Recommendations:   Universal precautions    Discharge Planning:   Estimated Length of IV antimicrobials: 5/5/2024  Patient will need Midline Catheter Insertion/ PICC line Insertion: No  Patient will need: Home IV , Infusion Center,  SNF,  
  [facility]  Progress Note    Demarco Wilson  MRN: [id number]  Date: 4/25/2024  LOS: 3     Reason for F/U :   E coli sepsis, RLE cellulitis    Impression :   E. coli sepsis, source not entirely clear  Right lower extremity cellulitis following a traumatic injury to the right foot  Acute hypoxic respiratory insufficiency, currently requiring centimeters of oxygen per nasal cannula  Acute kidney injury  Hyperkalemia, improved  Right-sided pleural effusion  Status post thoracentesis with 950 mL of hair-colored fluid removed 4/22/2024  Alcohol abuse  Sober since September 2023  Alcoholic cirrhosis of the liver with ascites  Hypoglycemia  Urethral stricture, status post cystoscopy with urethral dilatation and Cifuentes catheter placement 4/23/2024  Splenomegaly  Essential hypertension  Recent septic left knee prepatellar bursitis secondary to methicillin-resistant Staphylococcus aureus  Patient underwent left knee prepatellar irrigation and debridement with bursectomy 9/29/2023, completed a course of linezolid  Morbid obesity    Recommendations:   The patient continues on IV cefepime  Patient's blood cultures are positive for E. coli  The source of the bacteremia is not entirely clear, while E. coli is typically a GI or genitourinary source, the urine culture was negative, and the CT abdomen and pelvis is essentially unremarkable for a possible source of infection  The right lower extremity cellulitis continues to evolve, this is to be expected.  Explained to him and family at the bedside again that this will likely get worse before it gets better.  His lower extremities are quite edematous and I suspect this is also contributing to this worsening and the blisters  Continue supportive care    Infection Control Recommendations:   Universal precautions    Discharge Planning:   Estimated Length of IV antimicrobials: to be determined  Patient will need Midline Catheter Insertion/ PICC line Insertion: No  Patient 
  [facility]  Progress Note    Demarco Wilson  MRN: [id number]  Date: 5/3/2024  LOS: 11     Reason for F/U :   E. coli sepsis, cellulitis    Impression :   E. coli sepsis, source not entirely clear  Right lower extremity cellulitis following a traumatic injury to the right foot  Acute hypoxic respiratory insufficiency, currently requiring supplemental oxygen by nasal cannula  Acute kidney injury  Right-sided pleural effusion  Status post thoracentesis with 950 mL of hari-colored fluid removed 4/22/2024  Alcoholic cirrhosis of the liver with ascites and splenomegaly  Sober since September 2023  Urethral stricture  Status post otoscopy with urethral dilatation and Cifuentes catheter placement 4/23/2024  Essentially pretension  Morbid obesity  Anasarca  History of septic left knee prepatellar bursitis secondary to MRSA  Patient underwent left knee prepatellar irrigation and debridement with bursectomy 9/29/2023, completed a course of linezolid    Recommendations:   The patient continues on IV antimicrobial therapy with cefazolin for the E. coli isolated in the blood  Plan to continue antimicrobial therapy through 5/5/2024, to complete a 14-day course of therapy  Repeat culture data remains negative  Right lower extremity edema has overall improved.  The blisters are in varying stages.  The patient does tend to lay with his leg rotated outward with minimal activity, I did discuss with the patient and nursing staff my concern for the continued pressure on the lateral aspect of the right lower extremity and offloading pressure to that leg would be beneficial  Nephrology has recommended transfer the patient to [facility] for liver transplant evaluation, awaiting transfer  Continue supportive care    Infection Control Recommendations:   Universal precautions    Discharge Planning:   Estimated Length of IV antimicrobials: 5/5/2024  Patient will need Midline Catheter Insertion/ PICC line Insertion: 
  [facility]  Progress Note    Demarco Wilson  MRN: [id number]  Date: 5/2/2024  LOS: 10     Reason for F/U :   E. coli sepsis, cellulitis    Impression :   E. coli sepsis, source not entirely clear  Right lower extremity cellulitis following a traumatic injury to the right foot  Acute hypoxic respiratory insufficiency, currently requiring supplemental oxygen by nasal cannula  Acute kidney injury  Right-sided pleural effusion  Status post thoracentesis with 950 mL of hari-colored fluid removed 4/22/2024  Alcoholic cirrhosis of the liver with ascites and splenomegaly  Sober since September 2023  Urethral stricture  Status post otoscopy with urethral dilatation and Cifuentes catheter placement 4/23/2024  Essentially pretension  Morbid obesity  Anasarca  History of septic left knee prepatellar bursitis secondary to MRSA  Patient underwent left knee prepatellar irrigation and debridement with bursectomy 9/29/2023, completed a course of linezolid    Recommendations:   The patient continues on IV antimicrobial therapy with cefazolin for the E. coli isolated in the blood  Plan to continue antimicrobial therapy through 5/5/2024, to complete a 14-day course of therapy  Repeat culture data remains negative  Right lower extremity edema has overall improved.  The blisters are in varying stages.  The patient does tend to lay with his leg rotated outward with minimal activity, I did discuss with the patient, family, as well as Idalia with wound care my concern for the continued pressure on the lateral aspect of the right lower extremity and offloading pressure to that leg would be beneficial, Idalia is in to see the patient for further recommendations  Nephrology has recommended transfer the patient to [facility] for liver transplant evaluation  Continue supportive care    Infection Control Recommendations:   Universal precautions    Discharge Planning:   Estimated Length of IV antimicrobials: 
  [facility]  Progress Note    Demarco Wilson  MRN: [id number]  Date: 4/24/2024  LOS: 2     Reason for F/U :   E coli sepsis, RLE cellulitis    Impression :   E. coli sepsis, source not entirely clear  Right lower extremity cellulitis following a traumatic injury to the right foot  Acute hypoxic respiratory insufficiency, currently requiring centimeters of oxygen per nasal cannula  Acute kidney injury  Hyperkalemia, improved  Right-sided pleural effusion  Status post thoracentesis with 950 mL of hari-colored fluid removed 4/22/2024  Alcohol abuse  Sober since September 2023  Alcoholic cirrhosis of the liver with ascites  Hypoglycemia  Urethral stricture, status post cystoscopy with urethral dilatation and Cifuentes catheter placement 4/23/2024  Splenomegaly  Essential tension  Recent septic left knee prepatellar bursitis secondary to methicillin-resistant Staphylococcus aureus  Patient underwent left knee prepatellar irrigation and debridement with bursectomy 9/29/2023, completed a course of linezolid  Morbid obesity    Recommendations:   The patient continues on IV cefepime  Patient's blood cultures are positive for E. coli  The source of the bacteremia is not entirely clear, while E. coli is typically a GI or genitourinary source, the urine culture was negative, and the CT abdomen and pelvis is essentially unremarkable for a possible source of infection  The right lower extremity cellulitis has evolved slightly since yesterday, this is to be expected.  Explained to him and family at the bedside again that this will likely get worse before it gets better.  His lower extremities are quite edematous and I suspect this is also contributing to this worsening and the blisters  Continue supportive care    Infection Control Recommendations:   Universal precautions    Discharge Planning:   Estimated Length of IV antimicrobials: to be determined  Patient will need Midline Catheter Insertion/ PICC line Insertion: 
  [facility]  Progress Note    Demarco Wilson  MRN: [id number]  Date: 5/4/2024  LOS: 12     Reason for F/U :   E. coli sepsis, cellulitis    Impression :   E. coli sepsis, source not entirely clear  Right lower extremity cellulitis following a traumatic injury to the right foot  Acute hypoxic respiratory insufficiency, currently requiring supplemental oxygen by nasal cannula  Acute kidney injury  Right-sided pleural effusion  Status post thoracentesis with 950 mL of hari-colored fluid removed 4/22/2024  Alcoholic cirrhosis of the liver with ascites and splenomegaly  Sober since September 2023  Urethral stricture  Status post otoscopy with urethral dilatation and Cifuentes catheter placement 4/23/2024  Essentially pretension  Morbid obesity  Anasarca  History of septic left knee prepatellar bursitis secondary to MRSA  Patient underwent left knee prepatellar irrigation and debridement with bursectomy 9/29/2023, completed a course of linezolid    Recommendations:   The patient continues on IV antimicrobial therapy with cefazolin for the E. coli isolated in the blood  Plan to continue antimicrobial therapy through 5/5/2024, to complete a 14-day course of therapy  Right lower extremity with multiple blisters that have unroofed, I have spoken to nursing staff about attempting to keep pressure off of the lateral aspect of the right lower extremity  Nephrology has recommended transfer the patient to [facility] for liver transplant evaluation, awaiting transfer  Continue supportive care    Infection Control Recommendations:   Universal precautions    Discharge Planning:   Estimated Length of IV antimicrobials: 5/5/2024  Patient will need Midline Catheter Insertion/ PICC line Insertion: No  Patient will need: Home IV , Infusion Center,  SNF,  LTAC: Undetermined  Patient willneed outpatient wound care: No    Medical Decision making / Summary of Stay:   Demarco Wilson is a 53 y.o.-year-old male who was 
  [facility]  Progress Note    Demarco Wilson  MRN: [id number]  Date: 4/27/2024  LOS: 5     Reason for F/U :   E coli sepsis, RLE cellulitis    Impression :   E. coli sepsis, source not entirely clear  Right lower extremity cellulitis following a traumatic injury to the right foot  Acute hypoxic respiratory insufficiency, currently requiring centimeters of oxygen per nasal cannula  Acute kidney injury  Right-sided pleural effusion  Status post thoracentesis with 950 mL of hari-colored fluid removed 4/22/2024  Alcoholic cirrhosis of the liver with ascites  Urethral stricture, status post cystoscopy with urethral dilatation and Cifuentes catheter placement 4/23/2024  Alcohol abuse  Sober since September 2023  Splenomegaly  Essential hypertension  Recent septic left knee prepatellar bursitis secondary to methicillin-resistant Staphylococcus aureus  Patient underwent left knee prepatellar irrigation and debridement with bursectomy 9/29/2023, completed a course of linezolid  Morbid obesity    Recommendations:   The patient continues on IV cefepime   Follow CBC and renal function  Continue supportive care    Infection Control Recommendations:   Universal precautions    Discharge Planning:   Estimated Length of IV antimicrobials: to be determined  Patient will need Midline Catheter Insertion/ PICC line Insertion: No  Patient will need: Home IV , Infusion Center,  SNF,  LTAC: Undetermined  Patient willneed outpatient wound care: No    Medical Decision making / Summary of Stay:   Demarco Wilson is a 53 y.o.-year-old male who was initially admitted on 4/22/2024.  However is morbidly obese, has alcohol abuse and is known to our practice from recent hospitalization in September 2023.  At that time he developed acute onset left knee pain, was initially diagnosed with cellulitis and failed outpatient oral antibiotic therapy.  He was then admitted and found to have left knee septic prepatellar bursitis secondary to 
  [facility]  Progress Note    Demarco Wilson  MRN: [id number]  Date: 4/30/2024  LOS: 8     Reason for F/U :   E. coli sepsis, cellulitis    Impression :   E. coli sepsis, source not entirely clear  Right lower extremity cellulitis following a traumatic injury to the right foot  Acute hypoxic respiratory insufficiency, currently requiring  oxygen per nasal cannula  Acute kidney injury  Right-sided pleural effusion  Status post thoracentesis with 950 mL of hari-colored fluid removed 4/22/2024  Alcoholic cirrhosis of the liver with ascites and splenomegaly  Sober since September 2023  Urethral stricture  status post cystoscopy with urethral dilatation and Cifuentes catheter placement 4/23/2024  Essential hypertension  Morbid obesity  Anasarca  History of septic left knee prepatellar bursitis secondary to methicillin-resistant Staphylococcus aureus  Patient underwent left knee prepatellar irrigation and debridement with bursectomy 9/29/2023, completed a course of linezolid    Recommendations:   The patient continues on intravenous antimicrobial therapy with cefazolin for the E. coli isolated in the blood  The patient's leukocytosis is worsening though clinically the patient is doing okay  The patient is scheduled for a paracentesis to evaluate the intra-abdominal fluid for potential spontaneous bacterial peritonitis as the source of the E. coli sepsis  The repeat blood culture data thus far remains negative  For now we will continue to monitor his progress but there could be a consideration for imaging of the right lower extremity given the worsening leukocytosis    Infection Control Recommendations:   Universal precautions    Discharge Planning:   Estimated Length of IV antimicrobials: 14 days  Patient will need Midline Catheter Insertion/ PICC line Insertion: No  Patient will need: Home IV , Infusion Center,  SNF,  LTAC: Undetermined  Patient willneed outpatient wound care: No    Medical Decision making / 
  Physician Progress Note      PATIENT:               MARISSA FOSTER  CSN #:                  [id number]  :                       1970  ADMIT DATE:       2024 7:08 AM  DISCH DATE:  RESPONDING  PROVIDER #:        Fuentes Falk MD          QUERY TEXT:    Pt admitted with sepsis. Pt noted to have pulmonary edema on imaging. If   possible, please document in the progress notes and discharge summary if you   are evaluating and/or treating any of the following:    The medical record reflects the following:  Risk Factors: sepsis  Clinical Indicators: presented with sepsis, noted to have imaging showing   pulmonary edema without acuity or cause documented; dyspnea with exertion, CXR   shows mild pulmonary vascular congestion and possible minimal interstitial   pulmonary edema  Treatment: Labs, imaging, monitoring, IV Lasix x1 dose  Options provided:  -- Noncardiogenic acute pulmonary edema  -- Acute pulmonary edema due to acute systolic CHF  -- Other - I will add my own diagnosis  -- Disagree - Not applicable / Not valid  -- Disagree - Clinically unable to determine / Unknown  -- Refer to Clinical Documentation Reviewer    PROVIDER RESPONSE TEXT:    This patient has noncardiogenic acute pulmonary edema.    Query created by: Lisandra Martin on 2024 8:10 AM      Electronically signed by:  Fuentes Falk MD 2024 8:16 AM          
ASSESSMENT/PLAN:    1. Pneumonia of right lower lobe due to infectious organism    2. Cellulitis of right lower extremity    3. Hyperkalemia    4. MACARIO (acute kidney injury) (HCC)    5. Stricture of male urethra, unspecified stricture type  - Culture, Urine; Standing  - Culture, Urine        FOLLOW-UP:  If possible officiant from [facility] presents herself or himself, Writer will share contact information        05/02/24 1647   Encounter Summary   Encounter Overview/Reason Spiritual/Emotional Needs  (Assistance in Locating an Officiant for Desired Nuptials)   Service Provided For Patient;Significant other   Referral/Consult From Other    Support System Significant other   Last Encounter  05/02/24   Complexity of Encounter Low   Begin Time 1615   End Time  1645   Total Time Calculated 30 min   Spiritual/Emotional needs   Type Spiritual Support  (Information as to how to locate Officiant for Desired Nuptials)   Assessment/Intervention/Outcome   Assessment Calm;Peaceful;Hopeful;Other (Comment)  (Pleased to receive information for purpose of locating local officiant for desired marriage)   Intervention Active listening;Prayer (assurance of)/[place];Explored/Affirmed feelings, thoughts, concerns;Nurtured Hope;Other (Comment)  (Computer printout of website for locating possible wedding officiants)   Outcome Concerns relieved;Expressed Gratitude;Peace;New perspective/awareness;Receptive   Plan and Referrals   Plan/Referrals Continue Support (comment)  (If contact info for other possible local officiants comes in, Writer expects to share with Pt. and significant other.)       
Abd/pevis CT ordered with oral and IV contrast, Dr Mills called to get nephro clearance. Okay to do CT with both contrast per Dr mills. Hold PM lasix dose once.   
Access RN called, patient has bed at [facility] flight picked up patient, taken to [address] phone number [phone]. Writer called and gave report to DEANA Calix.   
Called RT to bedside this patient oxygen saturation was sitting around the 88% spot on the 3 L NC. NC increased to 5 L.   
Comprehensive Nutrition Assessment    Type and Reason for Visit:  RD Nutrition Re-Screen/LOS (Length of stay)    Nutrition Recommendations/Plan:   ADULT DIET; Regular; No Added Salt (3-4 gm); Low Potassium (Less than 3000 mg/day); Low Phosphorus (Less than 1000 mg); 1000 ml  Monitor p.o intakes and labs     Malnutrition Assessment:  Malnutrition Status:  At risk for malnutrition (Comment) (04/29/24 1803)        Nutrition Assessment:    Patient assessed for length of stay. Patient was admitted for hyperkalemia, MACARIO, cellulitis of right lower extremity, pneumonia of right lower lobe. RN reports patient required a stat cystoscopy last Tuesday (4/23/24) and has significant scrotal swelling. Patient is status post thoracentesis of 950 mL from the right lung (4/22). Patient is eating fairly well at meals with p.o intakes of 51-75%. Due to history for alcohol abuse patient has liver disease and likely needs a liver transplant. Patient reports that he stopped drinking last September (2023). Continue no added salt, low potassium, low phosphorus, 1000 mL fluid restriction. Monitor p.o intakes and labs.    Nutrition Related Findings:    Edema: +3 pitting LLE, perineal and sacral anasarca. Ascites. S/P cystoscopy 4/23. Thoracentesis 4/22 Wound Type: None       Current Nutrition Intake & Therapies:    Average Meal Intake: 51-75%  Average Supplements Intake: None Ordered  ADULT DIET; Regular; No Added Salt (3-4 gm); Low Potassium (Less than 3000 mg/day); Low Phosphorus (Less than 1000 mg); 1000 ml  Diet NPO    Anthropometric Measures:  Height: 188 cm (6' 2\")  Ideal Body Weight (IBW): 190 lbs (86 kg)       Current Body Weight: 140.6 kg (310 lb), 163.2 % IBW. Weight Source: Bed Scale  Current BMI (kg/m2): 39.8                          BMI Categories: Obese Class 2 (BMI 35.0 -39.9)    Estimated Daily Nutrient Needs:  Energy Requirements Based On: Kcal/kg  Weight Used for Energy Requirements: Current  Energy (kcal/day): 2802-7564 
Dr Falk contacted d/t patient's blood glucose being 31. Hypoglycemia protocol ordered.     Dr Hein contacted at 1447, patient's HR 140s-160s. EKG done and patient in SVT. Order to start poornima and titrate levo off received.   
Dr Howard felt as though what was scanned was more likely ascites in peritoneum. Wanted nephro notified Dr Yu was on, he was brought up to speed with situation. Had no new orders, agreed reading was most likely ascites. Stated next step would be dialysis, but not at this point. No new orders.  
Dr Mills notified of low urine output for shift (575). Ordered to increase lasix gtt to 20mg/hr. Scan bladder Dr Howard notified ordered to irrigate 60ml in smalls . Irrigated all that was returned is what was put in. It did not appear as if anymore returned.  
Dr Mills updated with most recent BMP, kayexalate, albumin, repeat BMP at 5pm ordered (see MAR).   
End Of Shift Note  [place] [facility]  Summary of shift: Overnight patients vitals stayed within normal limits with the support of the Sonny. Patient continues to weep from all extremities mainly the right leg and scrotum are affected a this point. AM labs show some improvement to WBC. Urinary output adequate following the 60 mg Lasix given towards the end of day-shift yesterday.     Vitals:    Vitals:    04/25/24 0415 04/25/24 0430 04/25/24 0445 04/25/24 0500   BP: 116/60 119/60 121/67 109/66   Pulse: 86 85 82 83   Resp: 13 14 14 14   Temp:       TempSrc:       SpO2: 99% 97%  97%   Weight:       Height:            I&O:   Intake/Output Summary (Last 24 hours) at 4/25/2024 0530  Last data filed at 4/25/2024 0345  Gross per 24 hour   Intake 3628.22 ml   Output 1659 ml   Net 1969.22 ml       Resp Status: 7 L HFNC, Expiratory wheezing.     Ventilator Settings:     / / /     Critical Care IV infusions:   phenylephrine 160 mcg/min (04/25/24 0316)    sodium chloride 10 mL/hr at 04/25/24 0316    dextrose          LDA:   CVC  04/22/24 Right Internal jugular (Active)   Number of days: 2       Peripheral IV 04/22/24 Right Antecubital (Active)   Number of days: 2       Urinary Catheter 04/23/24 Coude (Active)   Number of days: 2       Incision 09/29/23 Knee Left (Active)   Number of days: 208          
End Of Shift Note  [facility]  Summary of shift: ***    Vitals:    Vitals:    04/22/24 1750 04/22/24 1800 04/22/24 1815 04/22/24 1830   BP: (!) 109/45 (!) 68/56 (!) 105/48 112/85   Pulse: (!) 113 (!) 115 (!) 116 (!) 115   Resp: 27 28 21 14   Temp:       TempSrc:       SpO2: 93% 92% 92% 94%   Weight:       Height:            I&O:   Intake/Output Summary (Last 24 hours) at 4/22/2024 1911  Last data filed at 4/22/2024 1911  Gross per 24 hour   Intake 1929.06 ml   Output 950 ml   Net 979.06 ml       Resp Status: 3L      Critical Care IV infusions:   norepinephrine 53 mcg/min (04/22/24 1911)    sodium chloride Stopped (04/22/24 1543)    dextrose      phenylephrine (JACQUI-SYNEPHRINE) 50 mg in sodium chloride 0.9 % 250 mL infusion 175 mcg/min (04/22/24 1911)    sodium bicarbonate 150 mEq in dextrose 5 % 1,000 mL infusion 150 mL/hr at 04/22/24 1911        LDA:   CVC  04/22/24 Right Internal jugular (Active)   Number of days: 0       Peripheral IV 04/22/24 Right Antecubital (Active)   Number of days: 0       Peripheral IV 04/22/24 Right Basilic (Active)   Number of days: 0       Incision 09/29/23 Knee Left (Active)   Number of days: 206         
End Of Shift Note  [place] [facility]  Summary of shift: Patient alert and oriented x4, afebrile. 2L O2, no desaturations. Chest XR done. PTOT to see pt tomorrow. Right leg dressing changed, large amount of yellow drainage continues.     Vitals:    Vitals:    04/27/24 1400 04/27/24 1500 04/27/24 1652 04/27/24 1700   BP: 128/73 111/67  133/68   Pulse: (!) 105 (!) 102  100   Resp: 21 15  13   Temp:   97.6 °F (36.4 °C)    TempSrc:   Temporal    SpO2: 95% 98%  98%   Weight:       Height:            I&O:   Intake/Output Summary (Last 24 hours) at 4/27/2024 1710  Last data filed at 4/27/2024 1628  Gross per 24 hour   Intake 2372.82 ml   Output 1875 ml   Net 497.82 ml       Resp Status: 2L NC    Ventilator Settings:     / / /     Critical Care IV infusions:   phenylephrine Stopped (04/25/24 1632)    sodium chloride 10 mL/hr at 04/27/24 1628    dextrose          LDA:   Peripheral IV 04/22/24 Right Antecubital (Active)   Number of days: 5       Urinary Catheter 04/23/24 Coude (Active)   Number of days: 4       Wound Pretibial Right (Active)   Number of days:        Incision 09/29/23 Knee Left (Active)   Number of days: 211         
End Of Shift Note  [place] [facility]  Summary of shift: Patient alert and oriented x4. Afebrile. 2L NC. Abx had changed. Lg amount yellow drainage from right leg wound continues.    Vitals:    Vitals:    04/28/24 1600 04/28/24 1626 04/28/24 1743 04/28/24 1800   BP: 118/63  116/64 134/68   Pulse: 100  (!) 102 (!) 105   Resp: 18  17 18   Temp:  97.1 °F (36.2 °C)     TempSrc:  Temporal     SpO2: 95%  98% 97%   Weight:       Height:            I&O:   Intake/Output Summary (Last 24 hours) at 4/28/2024 1829  Last data filed at 4/28/2024 1617  Gross per 24 hour   Intake 1012.39 ml   Output 750 ml   Net 262.39 ml       Resp Status: 2L NC    Ventilator Settings:     / / /     Critical Care IV infusions:   phenylephrine Stopped (04/25/24 1632)    sodium chloride 10 mL/hr at 04/28/24 1617    dextrose          LDA:   Peripheral IV 04/22/24 Right Antecubital (Active)   Number of days: 6       Urinary Catheter 04/23/24 Coude (Active)   Number of days: 5       Wound Pretibial Right (Active)   Number of days:        Incision 09/29/23 Knee Left (Active)   Number of days: 212         
End Of Shift Note  [place] [facility]  Summary of shift: A&Ox4 throughout shift, talkative and in good spirits with good sense of humor; attempted bed pain multiple times at beginning of shift, x1 incontinent episode, no further incontinent episodes, 3-4 loose green med stools on bedpan; received 60mg IV Lasix x1 per Dr. Ranker; phosphate binder added with meals; sodium bicaarb was stopped, was able to wean poornima down from 215 to 190mcg/min; wife was at bedside most of day; palliative care consult for code status discussion, does not want to be on vent longer than 3 days. Anasarca everywhere, weeping from all extremities; Dr. Cunningham was notified regarding scrotum being very large and blistering; Dr. Cunningham at bedside and instructed on elevation; said smalls may be able to come out in 1 or 2 days.    Vitals:    Vitals:    04/24/24 1620 04/24/24 1625 04/24/24 1630 04/24/24 1635   BP:   102/80    Pulse: 86  86 84   Resp: 21  21 14   Temp:  96.8 °F (36 °C)     TempSrc:  Temporal     SpO2: 99%  95% 98%   Weight:       Height:            I&O:   Intake/Output Summary (Last 24 hours) at 4/24/2024 1922  Last data filed at 4/24/2024 1600  Gross per 24 hour   Intake 2390.94 ml   Output 805 ml   Net 1585.94 ml       Resp Status: Expiration wheezes and diminished throughout; 7L salter, denied SOB when laying flat or rolling to reposition. Ocassional non-productive cough.    Ventilator Settings:     / / /     Critical Care IV infusions:   phenylephrine 215 mcg/min (04/24/24 1117)    sodium chloride 10 mL/hr at 04/24/24 0933    dextrose          LDA:   CVC  04/22/24 Right Internal jugular (Active)   Number of days: 2       Peripheral IV 04/22/24 Right Antecubital (Active)   Number of days: 2       Urinary Catheter 04/23/24 Coude (Active)   Number of days: 1       Incision 09/29/23 Knee Left (Active)   Number of days: 208          
End Of Shift Note  [place] ICU  Summary of shift: Patient A&O x4, lethargic. RLE +3 pitting edema, joanna, large blisters and weeping, could only find pulse with doppler. Dr Falk notified. Remains on jacqui at 245, levo off. Bicarb gtt running. IV cefepime. Kayexalate given twice, Dr Mills updated with BMP results. Plan to recheck BMP in AM to see if K and kidney function has improved. Cifuentes remains patent. Patient Afebrile, NSR to ST. SpO2 between 88-90 most of afternoon, patient placed on 7L salter from 4L nc. SpO2>93%.    Vitals:    Vitals:    04/23/24 1820 04/23/24 1825 04/23/24 1830 04/23/24 1835   BP: (!) 93/49 (!) 95/55 (!) 80/41 (!) 90/56   Pulse: 93 91 92 87   Resp: 22 18 20 19   Temp:       TempSrc:       SpO2: 92% 93% (!) 86% 93%   Weight:       Height:            I&O:   Intake/Output Summary (Last 24 hours) at 4/23/2024 1839  Last data filed at 4/23/2024 1830  Gross per 24 hour   Intake 7870.37 ml   Output 300 ml   Net 7570.37 ml       Resp Status: 7L salter         Critical Care IV infusions:   phenylephrine 220 mcg/min (04/23/24 1830)    norepinephrine Stopped (04/23/24 1805)    sodium chloride 10 mL/hr at 04/23/24 1830    dextrose      phenylephrine (JACQUI-SYNEPHRINE) 50 mg in sodium chloride 0.9 % 250 mL infusion Stopped (04/23/24 1338)    sodium bicarbonate 150 mEq in dextrose 5 % 1,000 mL infusion 150 mL/hr at 04/23/24 1830        LDA:   CVC  04/22/24 Right Internal jugular (Active)   Number of days: 1       Peripheral IV 04/22/24 Right Antecubital (Active)   Number of days: 1       Peripheral IV 04/22/24 Right Basilic (Active)   Number of days: 1       Urinary Catheter 04/23/24 Coude (Active)   Number of days: 0       Incision 09/29/23 Knee Left (Active)   Number of days: 207         
End Of Shift Note  [place] ICU  Summary of shift: Patient tolerated paracentesis well with 5,300ml out. Patient tolerating foods. Patient received pain meds x1, satisfied with results. Will continue to monitor patient closely.    Vitals:    Vitals:    04/30/24 1247 04/30/24 1400 04/30/24 1600 04/30/24 1624   BP: (!) 119/58 118/62 (!) 125/54    Pulse: (!) 102 (!) 104 (!) 104    Resp: 18 22 20    Temp: 98.9 °F (37.2 °C)  98.1 °F (36.7 °C) 98.1 °F (36.7 °C)   TempSrc: Oral  Oral Oral   SpO2: 95% 92% 94%    Weight:       Height:            I&O:   Intake/Output Summary (Last 24 hours) at 4/30/2024 1805  Last data filed at 4/30/2024 1801  Gross per 24 hour   Intake 1274.07 ml   Output 6425 ml   Net -5150.93 ml       Resp Status: 2L NC    Ventilator Settings:     / / /     Critical Care IV infusions:   sodium chloride Stopped (04/30/24 1559)    dextrose          LDA:   Peripheral IV 04/30/24 Left Forearm (Active)   Number of days: 0       Urinary Catheter 04/23/24 Coude (Active)   Number of days: 7       Wound Pretibial Right (Active)   Number of days:        Incision 09/29/23 Knee Left (Active)   Number of days: 214          
End Of Shift Note  [place] [facility]  Summary of shift: Patient has had minimal u/o this shift - 75 mL's. Nephrology was contacted and lasix drip was d/c'd. Dr Gallardo ordered  albumin with 2 mg of bumex to be given 10 minutes after albumin was finished. Patient received IV morphine for severe leg pain during the shift. Patient had 3 episodes of emesis just prior to shift change with 300 mL's of yellow, milky liquid out. IV zofran was given with prn compazine ordered. Writer reached out to attending to discus patients condition and  change status to ICU.     Vitals:    Vitals:    05/04/24 1500 05/04/24 1600 05/04/24 1700 05/04/24 1800   BP: 109/72 (!) 127/107 116/71 117/71   Pulse: (!) 103 (!) 105 (!) 101 (!) 104   Resp: 20 22 16 23   Temp:  98 °F (36.7 °C)     TempSrc:  Oral     SpO2: (!) 89% (!) 88% 95% 92%   Weight:       Height:            I&O:   Intake/Output Summary (Last 24 hours) at 5/4/2024 1938  Last data filed at 5/4/2024 1827  Gross per 24 hour   Intake 1089.76 ml   Output 725 ml   Net 364.76 ml       Resp Status: 3L NC    Ventilator Settings:     / / /     Critical Care IV infusions:   sodium chloride 5 mL/hr at 05/02/24 1847    dextrose          LDA:   Peripheral IV 04/30/24 Left Forearm (Active)   Number of days: 4       Urinary Catheter 04/23/24 Coude (Active)   Number of days: 11       Wound Pretibial Right (Active)   Number of days:        Incision 09/29/23 Knee Left (Active)   Number of days: 218         
End Of Shift Note  [place] ICU  Summary of shift: Patient had 5mL in urine output this shift. Dr. Gallardo put consult to IR for STAT dialysis catheter to be placed and patient to have dialysis tomorrow, 5/6/24. Dr. Buitrago wanted to change it from dialysis catheter to trialysis so patient has an open med line in case pressor support is needed during dialysis. In the meantime, midodrine for systolic <100 is ordered prn. Patient had wound care and tolerated well. One dose of morphine given this shift, and one dose of zofran for nausea. Wife was at bedside during most of the shift.     Vitals:    Vitals:    05/05/24 1700 05/05/24 1800 05/05/24 1806 05/05/24 1836   BP: (!) 98/52 (!) 104/47     Pulse: 100 (!) 101 (!) 102    Resp: 13 19 20 20   Temp:       TempSrc:       SpO2:       Weight:       Height:            I&O:   Intake/Output Summary (Last 24 hours) at 5/5/2024 1849  Last data filed at 5/5/2024 1848  Gross per 24 hour   Intake 308.55 ml   Output 25 ml   Net 283.55 ml       Resp Status: 4L NC    Ventilator Settings:     / / /     Critical Care IV infusions:   sodium chloride 5 mL/hr at 05/05/24 1848    dextrose          LDA:   Peripheral IV 04/30/24 Left Forearm (Active)   Number of days: 5       Urinary Catheter 04/23/24 Coude (Active)   Number of days: 12       Wound Pretibial Right (Active)   Number of days:        Incision 09/29/23 Knee Left (Active)   Number of days: 219         
End Of Shift Note  [place] [facility]  Summary of shift: Ultrasound liver and scrotum performed 4/30. Scrotal US limited by edema but overall negative, liver ultrasound states \"Cirrhotic morphology of the liver\" and \"mildly hydropic gallbladder ... Consider HIDA exam.\" Paracentesis extracted 5.3L from abdomen. Patient expresses aching from insertion site. Changed leg and scrotal dressings. Patient presents as withdrawn, mild jaundice appearance. Lives at home with wife and dog.     Received albumin 25g x2  Received morphine 2mg IV x2 prior to dressing change and repositioning.     Vitals:    Vitals:    05/01/24 0256 05/01/24 0326 05/01/24 0400 05/01/24 0600   BP:   129/68 (!) 112/59   Pulse:   (!) 106 (!) 102   Resp: 22 18 15 17   Temp:   98.1 °F (36.7 °C)    TempSrc:   Oral    SpO2:   95%    Weight:       Height:            I&O:   Intake/Output Summary (Last 24 hours) at 5/1/2024 0632  Last data filed at 5/1/2024 0300  Gross per 24 hour   Intake 1021.6 ml   Output 6250 ml   Net -5228.4 ml       Resp Status: 2LNC    Ventilator Settings:     / / /     Critical Care IV infusions:   sodium chloride Stopped (04/30/24 1099)    dextrose          LDA:   Peripheral IV 04/30/24 Left Forearm (Active)   Number of days: 0       Urinary Catheter 04/23/24 Coude (Active)   Number of days: 8       Wound Pretibial Right (Active)   Number of days:        Incision 09/29/23 Knee Left (Active)   Number of days: 214              
End Of Shift Note  [place] ICU  Summary of shift: Uneventful shift. Patient received 2 doses of Morphine this shift r/t Right leg pain, and tolerated well. Patients R leg wound dressing was changed and new chux pad placed underneath for drainage. Patient remains on Lasix gtt at 10mg/hr. Patient had 450mL urine output this shift, remains orange in color. Patient did not have a BM this shift. Patients scrotum is still swollen/edematous and weeping. Lilypad placed underneath patients scrotum to help with comfort and drainage. Patient resting comfortably in bed at end of shift.     Vitals:    Vitals:    05/02/24 0500 05/02/24 0506 05/02/24 0530 05/02/24 0732   BP:       Pulse: (!) 106  (!) 105    Resp: 20  19    Temp:    97.8 °F (36.6 °C)   TempSrc:    Oral   SpO2: 91%  96%    Weight:  (!) 143.1 kg (315 lb 7.7 oz)     Height:            I&O:   Intake/Output Summary (Last 24 hours) at 5/2/2024 0738  Last data filed at 5/2/2024 0504  Gross per 24 hour   Intake 735.29 ml   Output 950 ml   Net -214.71 ml       Resp Status: Patient remains on 2L Nasal Cannula     Ventilator Settings:     / / /     Critical Care IV infusions:   furosemide (LASIX) 100 mg in sodium chloride 0.9 % 100 mL infusion 10 mg/hr (05/02/24 0204)    sodium chloride 5 mL/hr at 05/01/24 2221    dextrose          LDA:   Peripheral IV 04/30/24 Left Forearm (Active)   Number of days: 1       Urinary Catheter 04/23/24 Coude (Active)   Number of days: 9       Wound Pretibial Right (Active)   Number of days:        Incision 09/29/23 Knee Left (Active)   Number of days: 215         
End Of Shift Note  [place] ICU  Summary of shift: NPO at midnight. Wound care done on RLE. No BM. Antibiotics given. WBC 40.6 this morning and ID notified. RLE looks worse and redness is spreading up thigh. Scrotum still large and weeping. Pt slept well. Morphine given as needed. Plan for paracentesis today.    Vitals:    Vitals:    04/29/24 1934 04/30/24 0037 04/30/24 0207 04/30/24 0410   BP:  127/80  110/64   Pulse:  (!) 102 (!) 102 98   Resp:  14 21 13   Temp: 98.6 °F (37 °C) 98 °F (36.7 °C)  97.5 °F (36.4 °C)   TempSrc: Oral Oral  Oral   SpO2:  93% 93%    Weight:       Height:            I&O:   Intake/Output Summary (Last 24 hours) at 4/30/2024 0737  Last data filed at 4/30/2024 0628  Gross per 24 hour   Intake 370.47 ml   Output 750 ml   Net -379.53 ml       Resp Status: 3L NC salter    Ventilator Settings:     / / /     Critical Care IV infusions:   sodium chloride 10 mL/hr at 04/29/24 1912    dextrose          LDA:   Peripheral IV 04/22/24 Right Antecubital (Active)   Number of days: 8       Urinary Catheter 04/23/24 Coude (Active)   Number of days: 7       Wound Pretibial Right (Active)   Number of days:        Incision 09/29/23 Knee Left (Active)   Number of days: 213         
End Of Shift Note  [place] [facility]  Summary of shift: Patient remains A&O x4, VSS. WBC increasing, lactic drawn. Patient taken for CT/abd pelvis with oral and IV contrast. Clearance for CT with contrast received from Dr Mills, hold evening dose of lasix. +3 pitting edema BUE and BLE, right lower leg continuing to weep, with dressing in place. Patient received PRN morphine 3 times for pain. Liver US ordered, NPO at midnight. GI consulted, plan for paracentesis tomorrow. Scrotum still extremely swollen, blistered and weeping. Elevated. Patient's partner Saskia at bedside most of shift. Afebrile. Cifuentes remains patent, urine orange/hari.     Vitals:    Vitals:    04/29/24 1800 04/29/24 1840 04/29/24 1900 04/29/24 1934   BP: 113/69 136/76     Pulse: (!) 104 (!) 102 (!) 104    Resp: 18 20 21    Temp:    98.6 °F (37 °C)   TempSrc:    Oral   SpO2: 96% 95% 97%    Weight:       Height:            I&O:   Intake/Output Summary (Last 24 hours) at 4/29/2024 1946  Last data filed at 4/29/2024 1912  Gross per 24 hour   Intake 370.47 ml   Output 525 ml   Net -154.53 ml       Resp Status: 2L      Critical Care IV infusions:   sodium chloride 10 mL/hr at 04/29/24 1912    dextrose          LDA:   Peripheral IV 04/22/24 Right Antecubital (Active)   Number of days: 7       Urinary Catheter 04/23/24 Coude (Active)   Number of days: 6       Wound Pretibial Right (Active)   Number of days:        Incision 09/29/23 Knee Left (Active)   Number of days: 213         
End Of Shift Note  [facility]  Summary of shift: Overnight patient received 2 doses of PRN morphine following an increase I dosage related to increasing abd pain. Following PRN meds patient satisfied. RLE dressing changed per wound care order. Pt likely being transported to [facility] for possible transplant. Urinary output greater than 1,200 overnight. Patient genitalia remains extremely edematous at this time and he continues to weep from all extremities.    Vitals:    Vitals:    05/03/24 0000 05/03/24 0322 05/03/24 0340 05/03/24 0400   BP: 112/67   124/69   Pulse: (!) 101 98 100 99   Resp: 18 14 15 18   Temp: 97.7 °F (36.5 °C)      TempSrc: Temporal      SpO2: 93% 97% 96%    Weight:       Height:            I&O:   Intake/Output Summary (Last 24 hours) at 5/3/2024 0546  Last data filed at 5/3/2024 0322  Gross per 24 hour   Intake 1359.84 ml   Output 1618 ml   Net -258.16 ml       Resp Status: 2 L NC    Ventilator Settings:     / / /     Critical Care IV infusions:   furosemide (LASIX) 100 mg in sodium chloride 0.9 % 100 mL infusion 10 mg/hr (05/03/24 0204)    sodium chloride 5 mL/hr at 05/02/24 1847    dextrose          LDA:   Peripheral IV 04/30/24 Left Forearm (Active)   Number of days: 2       Urinary Catheter 04/23/24 Coude (Active)   Number of days: 10       Wound Pretibial Right (Active)   Number of days:        Incision 09/29/23 Knee Left (Active)   Number of days: 216          
End Of Shift Note  [facility]  Summary of shift: Patient remains stable, started on lasix gtt at end of shift. Patient requiring prn morphine more often for pain. Patient doing a better job at moving self around in bed - patient worked with therapy today and sat at edge of bed. Will continue to monitor patient's output closely while on lasix gtt. Hold lasix gtt if SBP < 90 (see misc nursing order). Possibly begin precert tomorrow - Dr. Evan pittman with discharge to rehab from his standpoint, will address with other physicians on care team tomorrow.    Vitals:    Vitals:    05/01/24 1600 05/01/24 1609 05/01/24 1611 05/01/24 1800   BP: 120/68   137/71   Pulse: (!) 104  (!) 104 (!) 104   Resp: 15  18 14   Temp: 97.1 °F (36.2 °C) 97.1 °F (36.2 °C)     TempSrc: Temporal Temporal     SpO2: 97%  98% 95%   Weight:       Height:            I&O:   Intake/Output Summary (Last 24 hours) at 5/1/2024 1829  Last data filed at 5/1/2024 1826  Gross per 24 hour   Intake 853.29 ml   Output 1075 ml   Net -221.71 ml       Resp Status: 3L NC    Ventilator Settings:     / / /     Critical Care IV infusions:   furosemide (LASIX) 100 mg in sodium chloride 0.9 % 100 mL infusion 10 mg/hr (05/01/24 1756)    sodium chloride Stopped (04/30/24 1559)    dextrose          LDA:   Peripheral IV 04/30/24 Left Forearm (Active)   Number of days: 1       Urinary Catheter 04/23/24 Coude (Active)   Number of days: 8       Wound Pretibial Right (Active)   Number of days:        Incision 09/29/23 Knee Left (Active)   Number of days: 215          
End Of Shift Note  [facility]  Summary of shift: Pt A&O throughout shift, dsg changed to RLE d/t lg amount of drainage. Received 240ml of ice chip and is aware of restrictions and had 1 md BM via bedpan    Vitals:    Vitals:    04/28/24 0400 04/28/24 0500 04/28/24 0600 04/28/24 0700   BP: 114/67  119/68    Pulse: (!) 106 (!) 106 (!) 103 (!) 101   Resp: 17 21 16 15   Temp: 97.8 °F (36.6 °C)      TempSrc: Temporal      SpO2: 95% 94% 100% 95%   Weight:       Height:            I&O:   Intake/Output Summary (Last 24 hours) at 4/28/2024 0725  Last data filed at 4/28/2024 0317  Gross per 24 hour   Intake 1409.78 ml   Output 1600 ml   Net -190.22 ml       Resp Status: 2L    Ventilator Settings:     / / /     Critical Care IV infusions:   phenylephrine Stopped (04/25/24 1632)    sodium chloride 10 mL/hr at 04/27/24 1628    dextrose          LDA:   Peripheral IV 04/22/24 Right Antecubital (Active)   Number of days: 5       Urinary Catheter 04/23/24 Coude (Active)   Number of days: 5       Wound Pretibial Right (Active)   Number of days:        Incision 09/29/23 Knee Left (Active)   Number of days: 211         
End Of Shift Note  [facility]  Summary of shift: Pt withdrawn; polite to staff but lacks sense of humor he had prior, pt states he just doesn't feel well; continued Lasix IV drip; Dr. Mills said he would like pt to be transferred to [facility] or  for liver transplant, pt with continue to get worse if not done; this nurse called Dr. Falk to start process and [facility] access called as well; pt prefers to go to [facility]; pt asked if  could  him and girlfriend of 20 years at bedside;  was notified but unable to facilitate;  presented pt with options to fulfill request; medicated x2 with IV morphine for pain in abdomen and Rt leg; wound care changed dressing to leg, drsg changed twice this shift, copious amt of serosanguineous drng.    Vitals:    Vitals:    05/02/24 1700 05/02/24 1729 05/02/24 1730 05/02/24 2021   BP:  127/66     Pulse: (!) 104  (!) 108    Resp: 21 18 22 22   Temp:       TempSrc:       SpO2: 93%  91%    Weight:       Height:            I&O:   Intake/Output Summary (Last 24 hours) at 5/2/2024 2034  Last data filed at 5/2/2024 1847  Gross per 24 hour   Intake 399.84 ml   Output 825 ml   Net -425.16 ml       Resp Status: Clear/diminished; 2L NC, denies SOB/cough    Ventilator Settings:     / / / NA    Critical Care IV infusions:   furosemide (LASIX) 100 mg in sodium chloride 0.9 % 100 mL infusion 10 mg/hr (05/02/24 1847)    sodium chloride 5 mL/hr at 05/02/24 1847    dextrose          LDA:   Peripheral IV 04/30/24 Left Forearm (Active)   Number of days: 2       Urinary Catheter 04/23/24 Coude (Active)   Number of days: 9       Wound Pretibial Right (Active)   Number of days:        Incision 09/29/23 Knee Left (Active)   Number of days: 216          
End Of Shift Note  [facility]  Summary of shift: pt had uneventful shift. Is withdrawn, no appetite. Gave prn morphine once. No urine output. Trialysis cath to be placed this am and dialysis to be done. Midodrine ordered as pressor support for systolic <100     Vitals:    Vitals:    05/05/24 2000 05/05/24 2249 05/06/24 0000 05/06/24 0400   BP: (!) 108/52  (!) 99/48 (!) 95/45   Pulse: (!) 101  (!) 102 95   Resp: 15 16 17 11   Temp: 97 °F (36.1 °C)  97.1 °F (36.2 °C) 97.1 °F (36.2 °C)   TempSrc: Temporal  Temporal Temporal   SpO2:       Weight:       Height:            I&O:   Intake/Output Summary (Last 24 hours) at 5/6/2024 0501  Last data filed at 5/5/2024 1848  Gross per 24 hour   Intake 308.55 ml   Output 25 ml   Net 283.55 ml       Resp Status: 4l NC     Ventilator Settings:     / / /     Critical Care IV infusions:   sodium chloride 5 mL/hr at 05/05/24 1848    dextrose          LDA:   Peripheral IV 04/30/24 Left Forearm (Active)   Number of days: 5       Urinary Catheter 04/23/24 Coude (Active)   Number of days: 13       Wound Pretibial Right (Active)   Number of days:        Incision 09/29/23 Knee Left (Active)   Number of days: 219          
End Of Shift Note  [facility]  Summary of shift: Pt A/Ox4 throughout shift but lethargic. Dressing on RLE changed x2 d/t blister drainage. Pt only c/o pain when repositioned, no PRN pain meds given/requested. Increased swelling/drainage/blistering in scrotal area. Diamox given. 1025mL hari u/o from smalls, pt placed on bedpan but no BM. Pt c/o heartburn, PRN tums ordered. No AM labs replaced, HIT panel pend, CXR pend. 480mL fluid intake.     Vitals:    Vitals:    04/26/24 2000 04/27/24 0000 04/27/24 0352 04/27/24 0400   BP: 108/69 129/68     Pulse: 100 (!) 101 (!) 101 (!) 102   Resp: 14 17 14 15   Temp: 97.3 °F (36.3 °C) 97.5 °F (36.4 °C)  97.7 °F (36.5 °C)   TempSrc: Temporal Temporal  Temporal   SpO2: 98% 94% 98% 98%   Weight:       Height:            I&O:   Intake/Output Summary (Last 24 hours) at 4/27/2024 0614  Last data filed at 4/27/2024 0614  Gross per 24 hour   Intake 1183.04 ml   Output 1625 ml   Net -441.96 ml       Resp Status: 2L    Ventilator Settings:     / / /     Critical Care IV infusions:   phenylephrine Stopped (04/25/24 1632)    sodium chloride Stopped (04/25/24 1333)    dextrose          LDA:   Peripheral IV 04/22/24 Right Antecubital (Active)   Number of days: 4       Urinary Catheter 04/23/24 Coude (Active)   Number of days: 4       Wound Pretibial Right (Active)   Number of days:        Incision 09/29/23 Knee Left (Active)   Number of days: 210          
End Of Shift Note  [facility]  Summary of shift: Pt lethargic all shift. Pt received morphine twice for pain in R leg. Pain seems to be worsening. 1 smear. Pt scrotum getting even more large and is now the size of a melon. Right Lower leg weeping is worse. It is running off his leg in a continuous flow. Pt drinking well. He is aware of the fluid restriction. Slept most of shift. Remains on 3L salter and BP stable throughout shift. No pressors needed.    Vitals:    Vitals:    04/26/24 0400 04/26/24 0500 04/26/24 0534 04/26/24 0600   BP: 125/76 119/66  118/68   Pulse: 97 99 (!) 107 (!) 107   Resp: 16 15 16 13   Temp: 98.9 °F (37.2 °C)      TempSrc: Oral      SpO2: 94% 94% 95% 94%   Weight:       Height:            I&O:   Intake/Output Summary (Last 24 hours) at 4/26/2024 0642  Last data filed at 4/26/2024 0534  Gross per 24 hour   Intake 281.95 ml   Output 3130 ml   Net -2848.05 ml       Resp Status: 3L NC salter    Ventilator Settings:     / / /     Critical Care IV infusions:   phenylephrine Stopped (04/25/24 1632)    sodium chloride Stopped (04/25/24 1333)    dextrose          LDA:   CVC  04/22/24 Right Internal jugular (Active)   Number of days: 3       Peripheral IV 04/22/24 Right Antecubital (Active)   Number of days: 3       Urinary Catheter 04/23/24 Coude (Active)   Number of days: 3       Incision 09/29/23 Knee Left (Active)   Number of days: 209         
End Of Shift Note  [facility]  Summary of shift: Uneventful shift; Sonny turned off @1632, tolerated well; oxygen weaned down to 3L salter; red/brown discharge noted from urethra; Rt lower leg blistering and sloughing away, xeroform placed over blisters and wrapped with chucks pad to collect drainage, copious amounts of serosanguineous drng; significant other at bedside for most of day. All extremities and scrotum extremely swollen and weeping; dangled at side of bed for a few min, pt tolerated poorly from discomfort of scrotum and Rt leg, cause 10/10 pain, medicated with Morphine 2mg when back in bed.    Vitals:    Vitals:    04/25/24 1830 04/25/24 1845 04/25/24 1900 04/25/24 1915   BP: 127/76  130/87    Pulse: 97 97 97 (!) 101   Resp: 19 17 16 20   Temp:       TempSrc:       SpO2: 96% 99% 98% 98%   Weight:       Height:            I&O:   Intake/Output Summary (Last 24 hours) at 4/25/2024 2008  Last data filed at 4/25/2024 1731  Gross per 24 hour   Intake 817.44 ml   Output 1684 ml   Net -866.56 ml       Resp Status: clear BUL/diminished BLL, expiratory wheezes still heard but improved since yesterday. 3L salter from 7L @ beginning of shift.    Ventilator Settings:     / / /     Critical Care IV infusions:   phenylephrine Stopped (04/25/24 1632)    sodium chloride Stopped (04/25/24 1333)    dextrose          LDA:   CVC  04/22/24 Right Internal jugular (Active)   Number of days: 3       Peripheral IV 04/22/24 Right Antecubital (Active)   Number of days: 3       Urinary Catheter 04/23/24 Coude (Active)   Number of days: 2       Incision 09/29/23 Knee Left (Active)   Number of days: 209          
End Of Shift Note  [facility]  Summary of shift: Pt has been calm and cooperative , compliant with meds and txs. Medicated for pain as ordered. Iv's have infused without signs or symptoms of adverse reactions or infiltrations. Call light has remained in reach. Pt has been checked on for safety.    Vitals:    Vitals:    05/03/24 0829 05/03/24 0845 05/03/24 1141 05/03/24 1639   BP:  113/68     Pulse:       Resp:       Temp: 97.7 °F (36.5 °C)  98.2 °F (36.8 °C) 98.4 °F (36.9 °C)   TempSrc: Oral  Oral Oral   SpO2:       Weight:       Height:            I&O:   Intake/Output Summary (Last 24 hours) at 5/3/2024 1820  Last data filed at 5/3/2024 0322  Gross per 24 hour   Intake 1359.84 ml   Output 1243 ml   Net 116.84 ml       Resp Status: 3L    Ventilator Settings:     / / /     Critical Care IV infusions:   furosemide (LASIX) 100 mg in sodium chloride 0.9 % 100 mL infusion 20 mg/hr (05/03/24 1625)    sodium chloride 5 mL/hr at 05/02/24 1847    dextrose          LDA:   Peripheral IV 04/30/24 Left Forearm (Active)   Number of days: 3       Urinary Catheter 04/23/24 Coude (Active)   Number of days: 10       Wound Pretibial Right (Active)   Number of days:        Incision 09/29/23 Knee Left (Active)   Number of days: 217         
Folic acid/Thiamine changed from IV to PO per [facility] [place] policy.    Basic Criteria (please refer to hospital policy for details):  1. functioning GI tract  2. tolerating PO intake    Thank you.  Jf Menjivar RPH 4/24/2024 4:50 PM    
[facility]  Consult Note       NAME:  Demarco Wilson  MEDICAL RECORD NUMBER:  [id number]  AGE: 53 y.o.   GENDER: male  : 1970  TODAY'S DATE:  2024    Subjective:      Demarco Wilson is a 53 y.o. male with inpatient referral to [facility] for:  \"Cellulitis, blistering. open wounds on leg\"      Wound Identification:  Wound Type: traumatic  Contributing Factors: edema, decreased mobility, obesity, and substance abuse    Wound History: patient with large blisters to RLE, intact and ruptured. Patient spouse reports that a log fell on patient leg and wounds are a result of this. Also reports of swelling to scrotum.  Current Wound Care Treatment:  ADELA with absorbent pad     Patient Goal of Care:  [x] Wound Healing  [] Odor Control  [] Palliative Care  [] Pain Control   [] Other:         PAST MEDICAL HISTORY        Diagnosis Date    Medial meniscus tear     MRSA bacteremia     left knee    Pneumonia        PAST SURGICAL HISTORY    Past Surgical History:   Procedure Laterality Date    CYSTOSCOPY N/A 2024    CYSTOSCOPY URETHRAL DILATATION WITH LAWLER PLACEMENT performed by Eliud Cunningham MD at [facility] OR    KNEE SURGERY Right     KNEE SURGERY Left 2023    1.Left knee prepatellar irrigation and debridement with bursectomy performed by Wisam Villeda MD at [facility] OR    WISDOM TOOTH EXTRACTION         FAMILY HISTORY    Family History   Problem Relation Age of Onset    High Blood Pressure Father        SOCIAL HISTORY    Social History     Tobacco Use    Smoking status: Never    Smokeless tobacco: Former     Types: Chew     Quit date: 2023   Vaping Use    Vaping Use: Never used   Substance Use Topics    Alcohol use: Yes     Alcohol/week: 42.0 standard drinks of alcohol     Types: 42 Cans of beer per week    Drug use: Never         ALLERGIES    No Known Allergies    HOME MEDICATIONS  Prior to Admission medications    Medication Sig Start Date End Date Taking? 
[facility].  [facility] still does not have a bed available at this time.  
Messaged NICKIE Gordon regarding critical WBC value this AM.   
New order for lasix gtt placed by nephro. RN paged Dr. Elliott to inquire about BP parameters and notifying nephro of urine output below specific amount. Awaiting response.  
Nutrition Assessment     Type and Reason for Visit: Reassess    Nutrition Recommendations/Plan:   ADULT DIET; Regular; No Added Salt (3-4 gm); Low Potassium (Less than 3000 mg/day); Low Phosphorus (Less than 1000 mg); 1000 ml   Monitor p.o intakes, GI function and labs     Malnutrition Assessment:  Malnutrition Status: At risk for malnutrition (Comment)    Nutrition Assessment:  Patient's condition has not changed. Patient continues to have anasarca in all four extremities with weeping and very swollen scrotom. Patient is having abdominal pain which has been treated with Morphine. Patient is awaiting transfer to [facility] to start the evaluation process for a liver transplant. Patient ate two halfs of Malay toast and a sausage link for breakfast this morning. Patient is looking forward to marrying his long time girlfriend this afternoon in his hospital room. Continue current diet and 1000 mL fluid restriction. Monitor p.o intakes, GI function and labs.    Estimated Daily Nutrient Needs:  Energy (kcal):  2809-9071 kcal (15-18 kcal/kg) Weight Used for Energy Requirements: Other (Comment) (140.9 kg from 4/22)     Protein (g):  103-112 gm of protein (1.2-1.3 gm/kg) Weight Used for Protein Requirements: Ideal        Fluid (ml/day):  1000 mL per diet order Method Used for Fluid Requirements: 1 ml/kcal    Nutrition Related Findings:   Edema: +4 pitting edema/ anasarca in all 4 extremities and scrotum. Jaundice with ascites. S/P Cystoscopy (4/23). Thoracentesis (4/22) Wound Type: None    Current Nutrition Therapies:    ADULT DIET; Regular; No Added Salt (3-4 gm); Low Potassium (Less than 3000 mg/day); Low Phosphorus (Less than 1000 mg); 1000 ml    Anthropometric Measures:  Height: 188 cm (6' 2\")  Current Body Wt: 143.1 kg (315 lb 7.7 oz) (fluid shifts)   BMI: 40.5    Nutrition Diagnosis:   Altered GI function related to altered GI function as evidenced by GI abnormality (liver disease)    Nutrition Interventions:   Food 
OK for palliative care consult if patient willing per Dr Falk.  
Occupational Therapy  [facility]  Occupational Therapy Not Seen    DATE: 2024    NAME: Demarco Wilson  MRN: [id number]   : 1970    Patient not seen this date for Occupational Therapy due to:      [x] Cancel by RN or physician due to:    [] Hemodialysis    [] Critical Lab Value Level     [] Blood transfusion in progress    [] Acute or unstable cardiovascular status   _MAP < 55 or more than >115  _HR < 40 or > 130    [] Acute or unstable pulmonary status   -FiO2 > 60%   _RR < 5 or >40    _O2 sats < 85%    [] Strict Bedrest    [x] Off Unit for surgery or procedure: (Patient in IR currently getting HD cath placed then will receive HD right after.)     [] Off Unit for testing       [] Pending imaging to R/O fracture    [] Refusal by Patient      [] Other      [] OT being discontinued at this time. Patient independent. No further needs.     [] OT being discontinued at this time as the patient has been transferred to hospice care. No further needs.      Hilary Ruggiero ADELA    
Occupational Therapy  DATE: 5/3/2024    NAME: Demarco Wilson  MRN: [id number]   : 1970    Patient not seen this date for Occupational Therapy due to:      [] Cancel by RN or physician due to:    [] Hemodialysis    [] Critical Lab Value Level     [] Blood transfusion in progress    [] Acute or unstable cardiovascular status   _MAP < 55 or more than >115  _HR < 40 or > 130    [] Acute or unstable pulmonary status   -FiO2 > 60%   _RR < 5 or >40    _O2 sats < 85%    [] Strict Bedrest    [] Off Unit for surgery or procedure    [] Off Unit for testing       [] Pending imaging to R/O fracture    [x] Refusal by Patient : Pt. Declined treatment d/t being transferred to  of [facility] today and pt. Waiting for person to  his girlfriend at bedside today. OT will cont to follow.     [] Other      [] OT being discontinued at this time. Patient independent. No further needs.     [] OT being discontinued at this time as the patient has been transferred to hospice care. No further needs.      ADELA YE   
Occupational Therapy  [facility]  Occupational Therapy Not Seen    DATE: 2024    NAME: Demarco Wilson  MRN: [id number]   : 1970    Patient not seen this date for Occupational Therapy due to:      [] Cancel by RN or physician due to:    [] Hemodialysis    [] Critical Lab Value Level     [] Blood transfusion in progress    [] Acute or unstable cardiovascular status   _MAP < 55 or more than >115  _HR < 40 or > 130    [] Acute or unstable pulmonary status   -FiO2 > 60%   _RR < 5 or >40    _O2 sats < 85%    [] Strict Bedrest    [x] Off Unit for surgery or procedure (paracentesis)     [] Off Unit for testing       [] Pending imaging to R/O fracture    [] Refusal by Patient      [] Other      [] OT being discontinued at this time. Patient independent. No further needs.     [] OT being discontinued at this time as the patient has been transferred to hospice care. No further needs.      ADELA PATINO   
Occupational Therapy  Facility/Department: [facility] ICU  Rehabilitation Occupational Therapy Daily Treatment Note    Date: 24  Patient Name: Demarco Wilson       Room: 1108/1108-01  MRN: [id number]  Account: [id number]   : 1970  (53 y.o.) Gender: male     Past Medical History:  has a past medical history of Medial meniscus tear, MRSA bacteremia, and Pneumonia.  Past Surgical History:   has a past surgical history that includes knee surgery (Right); [place] tooth extraction; knee surgery (Left, 2023); and Cystoscopy (N/A, 2024).    Restrictions  Restrictions/Precautions: General Precautions, Fall Risk, Up as Tolerated  Other position/activity restrictions: 2L O2, RLE seeping wounds, smalls,  Required Braces or Orthoses?: No    Subjective  Subjective: Pt. supine in bed and agreeable for treatment.  Restrictions/Precautions: General Precautions;Fall Risk;Up as Tolerated  Objective     Cognition  Overall Cognitive Status: Exceptions  Arousal/Alertness: Appropriate responses to stimuli  Following Commands: Follows all commands without difficulty  Attention Span: Appears intact  Memory: Appears intact  Safety Judgement: Decreased awareness of need for assistance;Decreased awareness of need for safety  Problem Solving: Decreased awareness of errors;Assistance required to identify errors made;Assistance required to correct errors made  Insights: Decreased awareness of deficits  Initiation: Requires cues for some  Sequencing: Does not require cues  Cognition Comment: Pt. alert and followed all cues.  Orientation  Overall Orientation Status: Within Functional Limits  Orientation Level: Oriented X4         ADL    Functional Mobility  Activity:  (Bed mobility)  Assistance Level: Maximum assistance  Skilled Clinical Factors: Pt. completed bed mobility with max assist with instruction on positioning of legs and use of bedrails to assist with positioning.  Bed Mobility  Overall Assistance Level: Maximum 
Occupational Therapy  Facility/Department: [facility] ICU  Rehabilitation Occupational Therapy Daily Treatment Note    Date: 24  Patient Name: Demarco Wilson       Room: 1108/1108-01  MRN: [id number]  Account: [id number]   : 1970  (53 y.o.) Gender: male     Past Medical History:  has a past medical history of Medial meniscus tear, MRSA bacteremia, and Pneumonia.  Past Surgical History:   has a past surgical history that includes knee surgery (Right); [place] tooth extraction; knee surgery (Left, 2023); and Cystoscopy (N/A, 2024).    Restrictions  Restrictions/Precautions: General Precautions, Fall Risk, Up as Tolerated  Other position/activity restrictions: 2L O2, RLE seeping wounds, smalls,  Required Braces or Orthoses?: No    Subjective  Subjective: \"I really want to get out of this bed.\"  Restrictions/Precautions: General Precautions;Fall Risk;Up as Tolerated    Objective     Cognition  Overall Cognitive Status: Exceptions  Arousal/Alertness: Appropriate responses to stimuli  Following Commands: Follows all commands without difficulty  Attention Span: Appears intact  Memory: Appears intact  Safety Judgement: Decreased awareness of need for assistance;Decreased awareness of need for safety  Problem Solving: Decreased awareness of errors;Assistance required to identify errors made;Assistance required to correct errors made  Insights: Decreased awareness of deficits  Initiation: Requires cues for some  Sequencing: Does not require cues  Cognition Comment: Pt. alert and followed all cues.  Orientation  Overall Orientation Status: Within Functional Limits  Orientation Level: Oriented X4         ADL    Functional Mobility  Device: Rolling walker  Assistance Level: Maximum assistance;Requires x 2 assistance  Skilled Clinical Factors: Pt. moved from supine to sit EOB with max assist x2 to move both feet over to EOB and place on floor.  Bed Mobility  Overall Assistance Level: Maximum Assistance;Requires x 2 
Occupational Therapy  Facility/Department: [facility] ICU  Daily Treatment Note  NAME: Demarco Wilson  : 1970  MRN: [id number]    Date of Service: 2024    Discharge Recommendations:  Patient would benefit from continued therapy after discharge  Pt currently functioning below baseline.  Recommend daily inpatient skilled therapy at time of discharge to maximize long term outcomes and prevent re-admission. Please refer to AM-PAC score for current level of function.      RN reports patient is medically stable for therapy treatment this date.    Chart reviewed prior to treatment and patient is agreeable for therapy.  All lines intact and patient positioned comfortably at end of treatment.  All patient needs addressed prior to ending therapy session.       Patient Diagnosis(es): The primary encounter diagnosis was Pneumonia of right lower lobe due to infectious organism. Diagnoses of Cellulitis of right lower extremity, Hyperkalemia, MACARIO (acute kidney injury) (HCC), and Stricture of male urethra, unspecified stricture type were also pertinent to this visit.     Assessment    Activity Tolerance: Patient limited by fatigue;Patient limited by pain;Patient limited by endurance      Plan   Occupational Therapy Plan  Times Per Week: 4-5x/week  Current Treatment Recommendations: Strengthening;Balance training;Functional mobility training;Self-Care / ADL;Safety education & training;Endurance training;Patient/Caregiver education & training;Positioning;Equipment evaluation, education, & procurement  Additional Comments: Cont with stated POC     Restrictions  Restrictions/Precautions  Restrictions/Precautions: General Precautions;Fall Risk;Up as Tolerated  Required Braces or Orthoses?: No  Position Activity Restriction  Other position/activity restrictions: 2L O2, RLE seeping wounds, smalls,    Subjective   Subjective  Subjective: pt with significant pain this date. C/o signifcant edema in scotum and pain in R LE with any 
Patient has had emesis x3 with around 100mL's each time of yellow, milky liquid. IV zofran was given prior to emesis @ 1755. Dr. Falk was contacted for something more - waiting response.   
Patient worked with therapy and was able to sit on the side of the bed for 15 minutes. After working with therapy, RN provided patient with pain medication for 7/10 pain to abdomen and right leg along with dressing changes. Patient currently resting comfortably in bed. Will continue to monitor.  
Patient's K 6.0, Dr Falk notified. 1L bolus, 1 gram calcium gluconate, 6 units IV insulin, one amp D50, 10 g lokelma ordered (see MAR).   
Patient's pH 7.16 on VBG, HCO3 17.9. Critical result called to Dr Hein, 2 amps bicarb ordered, as well as bicarb gtt (see MAR). Run LR at 75 ml/hr. Also consult ID. Dr Hein to bedside.   
Physical Therapy  DATE: 2024    NAME: Demarco Wilson  MRN: [id number]   : 1970    Patient not seen this date for Physical Therapy due to:      [] Cancel by RN or physician due to:    [] Hemodialysis    [] Critical Lab Value Level     [] Blood transfusion in progress    [] Acute or unstable cardiovascular status   _MAP < 55 or more than >115  _HR < 40 or > 130    [] Acute or unstable pulmonary status   -FiO2 > 60%   _RR < 5 or >40    _O2 sats < 85%    [] Strict Bedrest    [x] Off Unit for surgery or procedure pt out for paracentesis this am    [] Off Unit for testing       [] Pending imaging to R/O fracture    [] Refusal by Patient      [] Other      [] PT being discontinued at this time. Patient independent. No further needs.     [] PT being discontinued at this time as the patient has been transferred to hospice care. No further needs.      KATRIN CAO, PTA    
Physical Therapy  DATE: 2024    NAME: Demarco Wilson  MRN: [id number]   : 1970    Patient not seen this date for Physical Therapy due to:      [x] Cancel by RN or physician due to:    [] Hemodialysis    [] Critical Lab Value Level     [] Blood transfusion in progress    [] Acute or unstable cardiovascular status   _MAP < 55 or more than >115  _HR < 40 or > 130    [] Acute or unstable pulmonary status   -FiO2 > 60%   _RR < 5 or >40    _O2 sats < 85%    [] Strict Bedrest    [x] Off Unit for surgery or procedure (Patient in IR currently getting HD cath placed then will receive HD right after.)     [] Off Unit for testing       [] Pending imaging to R/O fracture    [] Refusal by Patient      [] Other      [] PT being discontinued at this time. Patient independent. No further needs.     [] PT being discontinued at this time as the patient has been transferred to hospice care. No further needs.      Nicki Howard, PTA     
Physical Therapy  DATE: 5/3/2024    NAME: Demarco Wilson  MRN: [id number]   : 1970    Patient not seen this date for Physical Therapy due to:      [] Cancel by RN or physician due to:    [] Hemodialysis    [] Critical Lab Value Level     [] Blood transfusion in progress    [] Acute or unstable cardiovascular status   _MAP < 55 or more than >115  _HR < 40 or > 130    [] Acute or unstable pulmonary status   -FiO2 > 60%   _RR < 5 or >40    _O2 sats < 85%    [] Strict Bedrest    [] Off Unit for surgery or procedure    [] Off Unit for testing       [] Pending imaging to R/O fracture    [x] Refusal by Patient  Saving his energy for marriage ceremony this afternoon.    [] Other      [] PT being discontinued at this time. Patient independent. No further needs.     [] PT being discontinued at this time as the patient has been transferred to hospice care. No further needs.      No Blackwood, PTA     
Physical Therapy  Facility/Department: [facility] ICU  Daily Treatment Note  NAME: Demarco Wilson  : 1970  MRN: [id number]    Date of Service: 2024    Discharge Recommendations:  Pt currently functioning below baseline.  Recommend daily inpatient skilled therapy at time of discharge to maximize long term outcomes and prevent re-admission. Please refer to AM-PAC score for current level of function.       Patient Diagnosis(es): The primary encounter diagnosis was Pneumonia of right lower lobe due to infectious organism. Diagnoses of Cellulitis of right lower extremity, Hyperkalemia, MACARIO (acute kidney injury) (HCC), and Stricture of male urethra, unspecified stricture type were also pertinent to this visit.    Assessment   Assessment: Pt. in too much pain and with too much fatigue to do more than bed ex/ rolling this session. Had been given IV pain meds and very tired. Scrotal size/pain impeding LE motion with ex/ positioning. Will continue to progress as able. Co-Tx for supine to sit transfer.  Activity Tolerance: Patient limited by fatigue;Patient limited by pain;Patient limited by endurance     Plan    Physical Therapy Plan  General Plan: 5-7 times per week  Specific Instructions for Next Treatment: Continue as Co-Tx, working on bed mob. and sitting tolerance/balance; also ther ex. in supine and seated positions;  Current Treatment Recommendations: Strengthening;ROM;Balance training;Functional mobility training;Patient/Caregiver education & training;Therapeutic activities;Home exercise program;Safety education & training     Restrictions  Restrictions/Precautions  Restrictions/Precautions: General Precautions, Fall Risk, Up as Tolerated  Required Braces or Orthoses?: No  Position Activity Restriction  Other position/activity restrictions: 2L O2, RLE seeping wounds, smalls,     Subjective    Subjective  Subjective: pt agreeable to PT, however states he cannot sit up at this time.  Pain: c/o scrotal pain from 
Physical Therapy  Facility/Department: [facility] ICU  Physical Therapy Initial Assessment    Name: Demarco Wilson  : 1970  MRN: [id number]  Date of Service: 2024    Discharge Recommendations:  Continue to assess pending progress, Patient would benefit from continued therapy after discharge        PER HPI:   Demarco Wilson is a 53 y.o.-year-old male who was initially admitted on 2024.  However is morbidly obese, has alcohol abuse and is known to our practice from recent hospitalization in 2023.  At that time he developed acute onset left knee pain, was initially diagnosed with cellulitis and failed outpatient oral antibiotic therapy.  He was then admitted and found to have left knee septic prepatellar bursitis secondary to MRSA.  He underwent left knee prepatellar irrigation and debridement with bursectomy on 2023 and discharged on a 7-day course of linezolid.     Patient presented to the emergency department 2024 stating that he dropped a log on his right foot about 8 days prior.  Patient tells me that last Saturday he dropped a log on his right foot that fell on the middle 3 toes.  It immediately started to bleed. Since then he has had worsening pain, swelling and redness of the right foot and this past  he started to have redness, swelling and clear drainage from the right leg extending to the shin.  He actually reports increased swelling to bilateral lower extremities, right is greater than left.  The patient tells me that he was also started on lactulose that he started over the weekend and since then has been experiencing diarrhea.  He has not had any associated abdominal pain.  He has also been having coughing spells that are so bad they have caused him to vomit about 3 times.  The cough has been present since the fall but it is progressively gotten worse over the last month.  He does not recall any generalized shortness of breath except when he gets into his coughing spells.  
Physical Therapy  Facility/Department: [facility] ICU  Rehabilitation Physical Therapy Treatment Note    NAME: Demarco Wilson  : 1970 (53 y.o.)  MRN: [id number]  CODE STATUS: Full Code    Date of Service: 24       Restrictions:  Restrictions/Precautions: General Precautions, Fall Risk, Up as Tolerated  Position Activity Restriction  Other position/activity restrictions: 2L O2, RLE seeping wounds, smalls,     SUBJECTIVE  Subjective  Subjective: pt agreeable to PT pt had drainage from wounds requiring linen change in bed               OBJECTIVE  Cognition  Overall Cognitive Status: Exceptions  Arousal/Alertness: Appropriate responses to stimuli  Following Commands: Follows all commands without difficulty  Attention Span: Appears intact  Memory: Appears intact  Safety Judgement: Decreased awareness of need for assistance;Decreased awareness of need for safety  Problem Solving: Decreased awareness of errors;Assistance required to identify errors made;Assistance required to correct errors made  Insights: Decreased awareness of deficits  Initiation: Requires cues for some  Orientation  Overall Orientation Status: Within Functional Limits  Orientation Level: Oriented X4    Functional Mobility  Bed Mobility  Overall Assistance Level: Moderate Assistance;Requires x 2 Assistance  Roll Left  Assistance Level: Moderate assistance;Requires x 2 assistance  Roll Right  Assistance Level: Moderate assistance;Requires x 2 assistance  Scooting  Assistance Level: Maximum assistance;Requires x 2 assistance    Pt requiring MOD A x 2 for rolling multiple times and pericare as well as bed bath. Extended time needed to position pt comfortably in bed at conclusion of treatment session          PT Exercises  Exercise Treatment: reviewed supine isometrics with pt.      ASSESSMENT/PROGRESS TOWARDS GOALS  Vital Signs  BP Location: Left upper arm  O2 Device: Nasal cannula    Assessment  Activity Tolerance: Patient limited by pain, pt has met STG 
Physical Therapy  Facility/Department: [facility] ICU  Rehabilitation Physical Therapy Treatment Note    NAME: Demarco Wilson  : 1970 (53 y.o.)  MRN: [id number]  CODE STATUS: Full Code    Date of Service: 24       Restrictions:  Restrictions/Precautions: General Precautions, Fall Risk, Up as Tolerated  Position Activity Restriction  Other position/activity restrictions: 2L O2, RLE seeping wounds, smalls,     SUBJECTIVE  Subjective  Subjective: pt agreeable to PT               OBJECTIVE  Cognition  Overall Cognitive Status: Exceptions  Arousal/Alertness: Appropriate responses to stimuli  Following Commands: Follows all commands without difficulty  Attention Span: Appears intact  Memory: Appears intact  Safety Judgement: Decreased awareness of need for assistance;Decreased awareness of need for safety  Problem Solving: Decreased awareness of errors;Assistance required to identify errors made;Assistance required to correct errors made  Insights: Decreased awareness of deficits  Initiation: Requires cues for some  Sequencing: Does not require cues  Cognition Comment: Pt. alert and followed all cues.  Orientation  Overall Orientation Status: Within Functional Limits  Orientation Level: Oriented X4    Functional Mobility  Bed Mobility  Overall Assistance Level: Moderate Assistance;Requires x 2 Assistance  Roll Left  Assistance Level: Moderate assistance;Requires x 2 assistance;Maximum assistance  Roll Right  Assistance Level: Moderate assistance;Maximum assistance;Requires x 2 assistance  Sit to Supine  Assistance Level: Maximum assistance;Requires x 2 assistance  Supine to Sit  Assistance Level: Maximum assistance;Requires x 2 assistance  Scooting  Assistance Level: Maximum assistance;Requires x 2 assistance    Pt able to sit edge of bed with feet supported on floor for approx. 15-20  min SBS, however pt required MAX A x2 for bed mobility to transfer to edge of bed and for linen change                   
Progress Note    Subjective:  Follow-up on progressive renal failure  Anasarca  Hepatorenal syndrome  Hypotension  Alcoholic cirrhosis with ascites  Right lower extremity cellulitis  The patient is awake and alert.  No problems overnight.  Denies chest pain, angina, and dyspnea.  Denies abdominal pain.  Tolerating diet.  No nausea or vomiting.    Admit Date:  4/22/2024    Patient Seen, Chart, Labs, Radiology studies, and Consults reviewed.    Objective:   BP (!) 111/51   Pulse 97   Temp 97.9 °F (36.6 °C) (Oral)   Resp 12   Ht 1.88 m (6' 2\")   Wt (!) 143.1 kg (315 lb 7.7 oz)   SpO2 99%   BMI 40.50 kg/m²     Intake/Output Summary (Last 24 hours) at 5/6/2024 1553  Last data filed at 5/5/2024 1848  Gross per 24 hour   Intake 308.55 ml   Output --   Net 308.55 ml     General appearance - alert, well appearing, and in no distress and oriented to person, place, and time  Heart:  RRR, no murmurs, gallops, or rubs, extrasystoles.  Lungs:  CTA bilaterally, no wheeze, rales or rhonchi, good air entry, good respiratory effort  Abd: bowel sounds present, nontender, nondistended, no masses, no rigidity, no guarding, no peritoneal signs.  Integumentary: Skin good color, good turgor, no rashes, no acute lesions  Upper Extrem:  No clubbing bilateral hands, no cyanosis or edema  Lower Extrem:no cyanosis or edema, no calf tenderness to lower extremities  Neurological - alert, oriented, normal speech, no focal findings or movement disorder noted, neck supple without rigidity, motor and sensory grossly normal bilaterally      Medications:    polyethylene glycol  17 g Oral Daily    pantoprazole  40 mg Oral QAM AC    docusate sodium  100 mg Oral BID    rifAXIMin  400 mg Oral TID    folic acid  1 mg Oral Daily    thiamine mononitrate  100 mg Oral Daily    [Held by provider] heparin (porcine)  5,000 Units SubCUTAneous BID    sodium chloride flush  5-40 mL IntraVENous 2 times per day    dextrose bolus  250 mL IntraVENous Once    
Progress Note    Subjective:  Follow-up on sepsis with septic shock, MACARIO, hypoglycemia, hyperkalemia  Urethral stricture  The patient is awake and alert.  No problems overnight.  Denies chest pain, angina, and dyspnea.  Denies abdominal pain.  Tolerating diet.  No nausea or vomiting.    Admit Date:  4/22/2024    Patient Seen, Chart, Labs, Radiology studies, and Consults reviewed.    Objective:   /61   Pulse (!) 112   Temp 97.3 °F (36.3 °C) (Temporal)   Resp 20   Ht 1.88 m (6' 2\")   Wt (!) 140.9 kg (310 lb 10.1 oz)   SpO2 (!) 87%   BMI 39.88 kg/m²     Intake/Output Summary (Last 24 hours) at 4/23/2024 1615  Last data filed at 4/23/2024 1351  Gross per 24 hour   Intake 7418.7 ml   Output --   Net 7418.7 ml     General appearance - alert, well appearing, and in no distress and oriented to person, place, and time  Heart:  RRR, no murmurs, gallops, or rubs, extrasystoles.  Lungs:  CTA bilaterally, no wheeze, rales or rhonchi, good air entry, good respiratory effort  Abd: bowel sounds present, nontender, nondistended, no masses, no rigidity, no guarding, no peritoneal signs.  Integumentary: Skin good color, good turgor, no rashes, no acute lesions  Upper Extrem:  No clubbing bilateral hands, no cyanosis or edema  Lower Extrem:no cyanosis or edema, no calf tenderness to lower extremities  Neurological - alert, oriented, normal speech, no focal findings or movement disorder noted, neck supple without rigidity, motor and sensory grossly normal bilaterally      Medications:    [START ON 4/24/2024] cefepime  2,000 mg IntraVENous Q24H    albumin human 25%  25 g IntraVENous Q6H    sodium chloride flush  5-40 mL IntraVENous 2 times per day    folic acid 1 mg, thiamine (B-1) 100 mg in sodium chloride 0.9 % 50 mL IVPB   IntraVENous Daily    dextrose bolus  250 mL IntraVENous Once    glucagon (rDNA)  1 mg IntraMUSCular Once    enoxaparin  30 mg SubCUTAneous BID       Data:  CBC:   Recent Labs     04/22/24  0815 
Progress Note    Subjective:  Follow-up on septic shock  Hypoglycemia-resolved  Cellulitis right leg  Suspected community-acquired pneumonia  Alcoholic cirrhosis with ascites and right pulmonary effusion  MACARIO  Urinary retention    The patient is awake and alert.  No problems overnight.  Denies chest pain, angina, and dyspnea.  Denies abdominal pain.  Tolerating diet.  No nausea or vomiting.    Admit Date:  4/22/2024    Patient Seen, Chart, Labs, Radiology studies, and Consults reviewed.    Objective:   /75   Pulse 92   Temp 98.3 °F (36.8 °C) (Oral)   Resp 25   Ht 1.88 m (6' 2\")   Wt (!) 140.9 kg (310 lb 10.1 oz)   SpO2 93%   BMI 39.88 kg/m²     Intake/Output Summary (Last 24 hours) at 4/25/2024 1628  Last data filed at 4/25/2024 1333  Gross per 24 hour   Intake 2832.28 ml   Output 1084 ml   Net 1748.28 ml     General appearance - alert, well appearing, and in no distress and oriented to person, place, and time  Heart:  RRR, no murmurs, gallops, or rubs, extrasystoles.  Lungs:  CTA bilaterally, no wheeze, rales or rhonchi, good air entry, good respiratory effort  Abd: bowel sounds present, nontender, nondistended, no masses, no rigidity, no guarding, no peritoneal signs.  Integumentary: Skin good color, good turgor, no rashes, no acute lesions  Upper Extrem:  No clubbing bilateral hands, no cyanosis or edema  Lower Extrem:no cyanosis or edema, no calf tenderness to lower extremities  Neurological - alert, oriented, normal speech, no focal findings or movement disorder noted, neck supple without rigidity, motor and sensory grossly normal bilaterally      Medications:    [START ON 4/26/2024] cefepime  2,000 mg IntraVENous Q12H    folic acid  1 mg Oral Daily    thiamine mononitrate  100 mg Oral Daily    sevelamer  800 mg Oral TID     heparin (porcine)  5,000 Units SubCUTAneous BID    sodium chloride flush  5-40 mL IntraVENous 2 times per day    dextrose bolus  250 mL IntraVENous Once    glucagon (rDNA)  1 
Progress Note    Subjective:  Follow-up on septic shock  MACARIO  Anasarca with volume overload  Alcoholic cirrhosis with ascites, pleural effusion.  Aspirate negative for infection or malignant  Leukocytosis  The patient is awake and alert.  No problems overnight.  Denies chest pain, angina, and dyspnea.  Denies abdominal pain.  Tolerating diet.  No nausea or vomiting.    Admit Date:  4/22/2024    Patient Seen, Chart, Labs, Radiology studies, and Consults reviewed.    Objective:   /67   Pulse (!) 105   Temp 97.8 °F (36.6 °C) (Oral)   Resp 19   Ht 1.88 m (6' 2\")   Wt (!) 143.1 kg (315 lb 7.7 oz)   SpO2 96%   BMI 40.50 kg/m²     Intake/Output Summary (Last 24 hours) at 5/2/2024 0816  Last data filed at 5/2/2024 0504  Gross per 24 hour   Intake 495.29 ml   Output 950 ml   Net -454.71 ml     General appearance - alert, well appearing, and in no distress and oriented to person, place, and time  Heart:  RRR, no murmurs, gallops, or rubs, extrasystoles.  Lungs:  CTA bilaterally, no wheeze, rales or rhonchi, good air entry, good respiratory effort  Abd: bowel sounds present, nontender, nondistended, no masses, no rigidity, no guarding, no peritoneal signs.  Integumentary: Skin good color, good turgor, no rashes, no acute lesions  Upper Extrem:  No clubbing bilateral hands, no cyanosis or edema  Lower Extrem:no cyanosis or edema, no calf tenderness to lower extremities  Neurological - alert, oriented, normal speech, no focal findings or movement disorder noted, neck supple without rigidity, motor and sensory grossly normal bilaterally      Medications:    spironolactone  50 mg Oral BID    polyethylene glycol  17 g Oral Daily    docusate sodium  100 mg Oral BID    albumin human 25%  25 g IntraVENous Q6H    rifAXIMin  400 mg Oral TID    ceFAZolin  2,000 mg IntraVENous Q8H    folic acid  1 mg Oral Daily    thiamine mononitrate  100 mg Oral Daily    [Held by provider] heparin (porcine)  5,000 Units SubCUTAneous BID    
Progress Note    Subjective:  Follow-up with septic shock  MACARIO  Cellulitis of right leg  Alcoholic cirrhosis with ascites and pleural effusion  Anasarca  The patient is awake and alert.  No problems overnight.  Denies chest pain, angina, and dyspnea.  Denies abdominal pain.  Tolerating diet.  No nausea or vomiting.    Admit Date:  4/22/2024    Patient Seen, Chart, Labs, Radiology studies, and Consults reviewed.    Objective:   /74   Pulse (!) 103   Temp 98 °F (36.7 °C) (Oral)   Resp 20   Ht 1.88 m (6' 2\")   Wt (!) 140.9 kg (310 lb 10.1 oz)   SpO2 96%   BMI 39.88 kg/m²     Intake/Output Summary (Last 24 hours) at 4/26/2024 1214  Last data filed at 4/26/2024 0534  Gross per 24 hour   Intake 114.42 ml   Output 2530 ml   Net -2415.58 ml     General appearance - alert, well appearing, and in no distress and oriented to person, place, and time  Heart:  RRR, no murmurs, gallops, or rubs, extrasystoles.  Lungs:  CTA bilaterally, no wheeze, rales or rhonchi, good air entry, good respiratory effort  Abd: bowel sounds present, nontender, nondistended, no masses, no rigidity, no guarding, no peritoneal signs.  Integumentary: Skin good color, good turgor, no rashes, no acute lesions  Upper Extrem:  No clubbing bilateral hands, no cyanosis or edema  Lower Extrem:no cyanosis or edema, no calf tenderness to lower extremities  Neurological - alert, oriented, normal speech, no focal findings or movement disorder noted, neck supple without rigidity, motor and sensory grossly normal bilaterally      Medications:    acetaZOLAMIDE  500 mg IntraVENous Q12H    cefepime  2,000 mg IntraVENous Q12H    folic acid  1 mg Oral Daily    thiamine mononitrate  100 mg Oral Daily    sevelamer  800 mg Oral TID WC    heparin (porcine)  5,000 Units SubCUTAneous BID    sodium chloride flush  5-40 mL IntraVENous 2 times per day    dextrose bolus  250 mL IntraVENous Once    glucagon (rDNA)  1 mg IntraMUSCular Once       Data:  CBC:   Recent 
Progress Note    Subjective:  Follow-up with septic shock  MACARIO  Hyponatremia  Alcoholic cirrhosis with ascites and pleural effusion  The patient is awake and alert.  No problems overnight.  Denies chest pain, angina, and dyspnea.  Denies abdominal pain.  Tolerating diet.  No nausea or vomiting.    Admit Date:  4/22/2024    Patient Seen, Chart, Labs, Radiology studies, and Consults reviewed.    Objective:   /63   Pulse 100   Temp 97.1 °F (36.2 °C) (Temporal)   Resp 18   Ht 1.88 m (6' 2\")   Wt (!) 140.9 kg (310 lb 10.1 oz)   SpO2 95%   BMI 39.88 kg/m²     Intake/Output Summary (Last 24 hours) at 4/28/2024 1720  Last data filed at 4/28/2024 1617  Gross per 24 hour   Intake 1012.39 ml   Output 750 ml   Net 262.39 ml     General appearance - alert, well appearing, and in no distress and oriented to person, place, and time  Heart:  RRR, no murmurs, gallops, or rubs, extrasystoles.  Lungs:  CTA bilaterally, no wheeze, rales or rhonchi, good air entry, good respiratory effort  Abd: bowel sounds present, nontender, nondistended, no masses, no rigidity, no guarding, no peritoneal signs.  Integumentary: Skin good color, good turgor, no rashes, no acute lesions  Upper Extrem:  No clubbing bilateral hands, no cyanosis or edema  Lower Extrem:no cyanosis or edema, no calf tenderness to lower extremities  Neurological - alert, oriented, normal speech, no focal findings or movement disorder noted, neck supple without rigidity, motor and sensory grossly normal bilaterally      Medications:    ceFAZolin  2,000 mg IntraVENous Q8H    furosemide  40 mg IntraVENous BID    potassium chloride  20 mEq Oral Once    folic acid  1 mg Oral Daily    thiamine mononitrate  100 mg Oral Daily    [Held by provider] heparin (porcine)  5,000 Units SubCUTAneous BID    sodium chloride flush  5-40 mL IntraVENous 2 times per day    dextrose bolus  250 mL IntraVENous Once    glucagon (rDNA)  1 mg IntraMUSCular Once       Data:  CBC:   Recent 
Progress Note    Subjective:  Past family and social history unremarkable.  Septic shock Past family and social history unremarkable.    Acute renal failure  Hypoglycemia  Alcoholic cirrhosis with ascites and right pleural effusion  Anasarca  Cellulitis right lower extremity  The patient is awake and alert.  No problems overnight.  Denies chest pain, angina, and dyspnea.  Denies abdominal pain.  Tolerating diet.  No nausea or vomiting.    Admit Date:  4/22/2024    Patient Seen, Chart, Labs, Radiology studies, and Consults reviewed.    Objective:   BP (!) 146/45   Pulse 83   Temp 96.8 °F (36 °C) (Temporal)   Resp 12   Ht 1.88 m (6' 2\")   Wt (!) 140.9 kg (310 lb 10.1 oz)   SpO2 97%   BMI 39.88 kg/m²     Intake/Output Summary (Last 24 hours) at 4/24/2024 1252  Last data filed at 4/24/2024 0600  Gross per 24 hour   Intake 4091.03 ml   Output 555 ml   Net 3536.03 ml     General appearance - alert, well appearing, and in no distress and oriented to person, place, and time  Heart:  RRR, no murmurs, gallops, or rubs, extrasystoles.  Lungs:  CTA bilaterally, no wheeze, rales or rhonchi, good air entry, good respiratory effort  Abd: bowel sounds present, nontender, nondistended, no masses, no rigidity, no guarding, no peritoneal signs.  Integumentary: Skin good color, good turgor, no rashes, no acute lesions  Upper Extrem:  No clubbing bilateral hands, no cyanosis or edema  Lower Extrem:no cyanosis or edema, no calf tenderness to lower extremities  Neurological - alert, oriented, normal speech, no focal findings or movement disorder noted, neck supple without rigidity, motor and sensory grossly normal bilaterally      Medications:    cefepime  2,000 mg IntraVENous Q24H    albumin human 25%  25 g IntraVENous Q6H    heparin (porcine)  5,000 Units SubCUTAneous BID    sodium chloride flush  5-40 mL IntraVENous 2 times per day    folic acid 1 mg, thiamine (B-1) 100 mg in sodium chloride 0.9 % 50 mL IVPB   IntraVENous Daily 
Progress Note    Subjective:  This is follow-up on sepsis, septic shock  Right lower extremity cellulitis-E. coli  Alcoholic cirrhosis with ascites, pleural effusion negative for malignancy  Anasarca  Worsening renal failure  Urinary retention  The patient is awake and alert.  No problems overnight.  Denies chest pain, angina, and dyspnea.  Denies abdominal pain.  Tolerating diet.  No nausea or vomiting.    Admit Date:  4/22/2024    Patient Seen, Chart, Labs, Radiology studies, and Consults reviewed.    Objective:   /71   Pulse (!) 104   Temp 97.9 °F (36.6 °C) (Temporal)   Resp 19   Ht 1.88 m (6' 2\")   Wt (!) 143.1 kg (315 lb 7.7 oz)   SpO2 94%   BMI 40.50 kg/m²     Intake/Output Summary (Last 24 hours) at 5/4/2024 2138  Last data filed at 5/4/2024 1827  Gross per 24 hour   Intake 1089.76 ml   Output 725 ml   Net 364.76 ml     General appearance - alert, well appearing, and in no distress and oriented to person, place, and time  Heart:  RRR, no murmurs, gallops, or rubs, extrasystoles.  Lungs:  CTA bilaterally, no wheeze, rales or rhonchi, good air entry, good respiratory effort  Abd: bowel sounds present, nontender, nondistended, no masses, no rigidity, no guarding, no peritoneal signs.  Integumentary: Skin good color, good turgor, no rashes, no acute lesions  Upper Extrem:  No clubbing bilateral hands, no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  17 g Oral Daily    pantoprazole  40 mg Oral QAM AC    sodium zirconium cyclosilicate  5 g Oral BID    spironolactone  100 mg Oral Daily    docusate sodium  100 mg Oral BID    rifAXIMin  400 mg Oral TID    folic acid  1 mg Oral Daily    thiamine mononitrate  100 mg Oral Daily    [Held by provider] heparin (porcine)  5,000 Units SubCUTAneous BID    sodium chloride flush  5-40 mL IntraVENous 2 times per day    dextrose bolus  250 mL IntraVENous 
Reason for Consult:  Acute kidney injury.    Interval Hx:  Follow up for MACARIO.  Creatinine and eGFR have improved to normal range.  His sodium decreased to 134 today.  He does have worsening edema.  Weaned off of levophed and poornima, BP is stable.  Blood cultures growing E coli.  Good UOP, 1.6 L yesterday.  Alb 3.1.    HISTORY OF PRESENT ILLNESS:    The patient is a 53 y.o. male who has no history of CKD, presents with shortness of breath, R leg redness and swelling after an accident on Saturday 4/13/24. On previous labs from 2023 his serum creatinines have been between 0.5 to 0.6 with eGFR of >60s ml/min. On admission his creatinine was elevated at 2.7 that has risen to 3.0 today. Denies any problems with nausea, vomiting, appetite, diarrhea. Denies any recent use of NSAIDs or iv contrast.  His lowest recorded BP was 68/47 mmHg on admission.  He has history of alcohol abuse. He had a CT abdomen and pelvis without iv contrast that showed cirrhosis with ascites, anasarca and splenomegaly.  He also had urinary retention with failed Lawler catheter placement. He underwent cystoscopy with urethral dilation on 4/23/24.    Review Of Systems:   Constitutional: No fever, chills, lethargy, weakness or wt loss.  Cardiac:  No chest pain, dyspnea, orthopnea or PND.  Chest:              No cough, phlegm or wheezing.  Abdomen:  No abdominal pain, nausea, vomiting or diarrhea.  :   No hematuria, pyuria, dysuria or flank pain.  Extremities:  Co swelling and redness R leg, no joint pains.  All other ROS is negative.      Past Medical History:   Diagnosis Date    Medial meniscus tear     MRSA bacteremia     left knee    Pneumonia        Past Surgical History:   Procedure Laterality Date    CYSTOSCOPY N/A 4/23/2024    CYSTOSCOPY URETHRAL DILATATION WITH LAWLER PLACEMENT performed by Eliud Cunningham MD at [facility] OR    KNEE SURGERY Right     KNEE SURGERY Left 9/29/2023    1.Left knee prepatellar irrigation and debridement with bursectomy 
Reason for Consult:  Acute kidney injury.    Interval Hx:  Follow up for MACARIO.  Creatinine and eGFR have improved to normal range.  His sodium has been ranging 133-137.  He does have edema. UO remains low.  Weaned off of levophed and poornima, BP is stable.  Blood cultures growing E coli.  Alb 3.1.    HISTORY OF PRESENT ILLNESS:    The patient is a 53 y.o. male who has no history of CKD, presents with shortness of breath, R leg redness and swelling after an accident on Saturday 4/13/24. On previous labs from 2023 his serum creatinines have been between 0.5 to 0.6 with eGFR of >60s ml/min. On admission his creatinine was elevated at 2.7 that has risen to 3.0 today. Denies any problems with nausea, vomiting, appetite, diarrhea. Denies any recent use of NSAIDs or iv contrast.  His lowest recorded BP was 68/47 mmHg on admission.  He has history of alcohol abuse. He had a CT abdomen and pelvis without iv contrast that showed cirrhosis with ascites, anasarca and splenomegaly.  He also had urinary retention with failed Lawler catheter placement. He underwent cystoscopy with urethral dilation on 4/23/24.    Review Of Systems:   Constitutional: No fever, chills, lethargy, weakness or wt loss.  Cardiac:  No chest pain, dyspnea, orthopnea or PND.  Chest:              No cough, phlegm or wheezing.  Abdomen:  No abdominal pain, nausea, vomiting or diarrhea.  :   No hematuria, pyuria, dysuria or flank pain.  Extremities:  Co swelling and redness R leg, no joint pains.  All other ROS is negative.      Past Medical History:   Diagnosis Date    Medial meniscus tear     MRSA bacteremia     left knee    Pneumonia        Past Surgical History:   Procedure Laterality Date    CYSTOSCOPY N/A 4/23/2024    CYSTOSCOPY URETHRAL DILATATION WITH LAWLER PLACEMENT performed by Eliud Cunningham MD at [facility] OR    KNEE SURGERY Right     KNEE SURGERY Left 9/29/2023    1.Left knee prepatellar irrigation and debridement with bursectomy performed by Wisam LAKE 
Reason for Consult:  Acute kidney injury.    Interval Hx:  Follow up for MACARIO.  Creatinine is now rising.  His sodium has been ranging 131-137.  He does have edema. UO remains low.  Weaned off of levophed and poornima, BP is stable.  Blood cultures growing E coli.  Alb was 3.1.    HISTORY OF PRESENT ILLNESS:    The patient is a 53 y.o. male who has no history of CKD, presents with shortness of breath, R leg redness and swelling after an accident on Saturday 4/13/24. On previous labs from 2023 his serum creatinines have been between 0.5 to 0.6 with eGFR of >60s ml/min. On admission his creatinine was elevated at 2.7 that has risen to 3.0 today. Denies any problems with nausea, vomiting, appetite, diarrhea. Denies any recent use of NSAIDs or iv contrast.  His lowest recorded BP was 68/47 mmHg on admission.  He has history of alcohol abuse. He had a CT abdomen and pelvis without iv contrast that showed cirrhosis with ascites, anasarca and splenomegaly.  He also had urinary retention with failed Lawler catheter placement. He underwent cystoscopy with urethral dilation on 4/23/24.    Review Of Systems:   Constitutional: No fever, chills, lethargy, weakness or wt loss.  Cardiac:  No chest pain, dyspnea, orthopnea or PND.  Chest:              No cough, phlegm or wheezing.  Abdomen:  No abdominal pain, nausea, vomiting or diarrhea.  :   No hematuria, pyuria, dysuria or flank pain.  Extremities:  Co swelling and redness R leg, no joint pains.  All other ROS is negative.      Past Medical History:   Diagnosis Date    Medial meniscus tear     MRSA bacteremia     left knee    Pneumonia        Past Surgical History:   Procedure Laterality Date    CYSTOSCOPY N/A 4/23/2024    CYSTOSCOPY URETHRAL DILATATION WITH LAWLER PLACEMENT performed by Eliud Cunningham MD at [facility] OR    KNEE SURGERY Right     KNEE SURGERY Left 9/29/2023    1.Left knee prepatellar irrigation and debridement with bursectomy performed by Wisam Villeda MD at [facility] 
Reason for Consult:  Acute kidney injury.    Interval Hx:  Follow up for MACARIO.  Creatinine is rising.  His sodium is lower at 128.  He does have edema. UO remains low despite being on Lasix drip.  Weaned off of levophed and poornima, BP is stable.  Blood cultures growing E coli.  Alb was 3.1.    HISTORY OF PRESENT ILLNESS:    The patient is a 53 y.o. male who has no history of CKD, presents with shortness of breath, R leg redness and swelling after an accident on Saturday 4/13/24. On previous labs from 2023 his serum creatinines have been between 0.5 to 0.6 with eGFR of >60s ml/min. On admission his creatinine was elevated at 2.7 that has risen to 3.0 today. Denies any problems with nausea, vomiting, appetite, diarrhea. Denies any recent use of NSAIDs or iv contrast.  His lowest recorded BP was 68/47 mmHg on admission.  He has history of alcohol abuse. He had a CT abdomen and pelvis without iv contrast that showed cirrhosis with ascites, anasarca and splenomegaly.  He also had urinary retention with failed Lawler catheter placement. He underwent cystoscopy with urethral dilation on 4/23/24.    Review Of Systems:   Constitutional: No fever, chills, lethargy, weakness or wt loss.  Cardiac:  No chest pain, dyspnea, orthopnea or PND.  Chest:              No cough, phlegm or wheezing.  Abdomen:  No abdominal pain, nausea, vomiting or diarrhea.  :   No hematuria, pyuria, dysuria or flank pain.  Extremities:  Co swelling and redness R leg, no joint pains.  All other ROS is negative.      Past Medical History:   Diagnosis Date    Medial meniscus tear     MRSA bacteremia     left knee    Pneumonia        Past Surgical History:   Procedure Laterality Date    CYSTOSCOPY N/A 4/23/2024    CYSTOSCOPY URETHRAL DILATATION WITH LAWLER PLACEMENT performed by Eliud Cunningham MD at [facility] OR    KNEE SURGERY Right     KNEE SURGERY Left 9/29/2023    1.Left knee prepatellar irrigation and debridement with bursectomy performed by Wisam LAKE 
Reason for Consult:  Acute kidney injury.    Interval Hx:  Follow up for MACARIO.  Feels better.  Creatinine is improved today to 0.9, eGFR > 90.  BUN is also slowly improving and is 54 today.  Electrolytes are at goal.  Weaned off of levophed and poornima, BP is stable.  Blood cultures growing E coli.  Good UOP, 3.1 L yesterday.  Alb 3.1.  WBCs have improved to 16.7, Hgb 11.1. His platelets are low at 58.  Urine lytes drawn on 4/23/24 were c/w a hemodynamic MACARIO.    HISTORY OF PRESENT ILLNESS:    The patient is a 53 y.o. male who has no history of CKD, presents with shortness of breath, R leg redness and swelling after an accident on Saturday 4/13/24. On previous labs from 2023 his serum creatinines have been between 0.5 to 0.6 with eGFR of >60s ml/min. On admission his creatinine was elevated at 2.7 that has risen to 3.0 today. Denies any problems with nausea, vomiting, appetite, diarrhea. Denies any recent use of NSAIDs or iv contrast.  His lowest recorded BP was 68/47 mmHg on admission.  He has history of alcohol abuse. He had a CT abdomen and pelvis without iv contrast that showed cirrhosis with ascites, anasarca and splenomegaly.  He also had urinary retention with failed Lawler catheter placement. He underwent cystoscopy with urethral dilation on 4/23/24.    Review Of Systems:   Constitutional: No fever, chills, lethargy, weakness or wt loss.  Cardiac:  No chest pain, dyspnea, orthopnea or PND.  Chest:              No cough, phlegm or wheezing.  Abdomen:  No abdominal pain, nausea, vomiting or diarrhea.  :   No hematuria, pyuria, dysuria or flank pain.  Extremities:  Co swelling and redness R leg, no joint pains.  All other ROS is negative.      Past Medical History:   Diagnosis Date    Medial meniscus tear     MRSA bacteremia     left knee    Pneumonia        Past Surgical History:   Procedure Laterality Date    CYSTOSCOPY N/A 4/23/2024    CYSTOSCOPY URETHRAL DILATATION WITH LAWLER PLACEMENT performed by Eliud Cunningham 
Reason for Consult:  Acute kidney injury.    Interval Hx:  Follow up for MACARIO.  Feels better.  No BMP done today. His BUN and Cr were 60 and 2.2, K 4.0, Na 133, bicarbonate 29 yesterday.  Weaned off of levophed and poornima, BP is improving.  Blood cultures growing E coli.  UOP 3.13 L.  Alb 3.1.  WBCs had improved to 25.2, Hgb 9.9, plt 86 yesterday.  Urine lytes are c/w a hemodynamic MACARIO.    HISTORY OF PRESENT ILLNESS:    The patient is a 53 y.o. male who has no history of CKD, presents with shortness of breath, R leg redness and swelling after an accident on Saturday 4/13/24. On previous labs from 2023 his serum creatinines have been between 0.5 to 0.6 with eGFR of >60s ml/min. On admission hisher creatinine was elevated at 2.7 that has risen to 3.0 today. Denies any problems with nausea, vomiting, appetite, diarrhea. Denies any recent use of NSAIDs or iv contrast.  His lowest recorded BP was 68/47 mmHg on admission.  He has history of alcohol abuse. He had a CT abdomen and pelvis without iv contrast that showed cirrhosis with ascites, anasarca and splenomegaly.   He also had urinary retention with failed Lawler catheter placement. He underwent cystoscopy with urethral dilation on 4/23/24.     Review Of Systems:   Constitutional: No fever, chills, lethargy, weakness or wt loss.  Cardiac:  No chest pain, dyspnea, orthopnea or PND.  Chest:              No cough, phlegm or wheezing.  Abdomen:  No abdominal pain, nausea, vomiting or diarrhea.  :   No hematuria, pyuria, dysuria or flank pain.  Extremities:  Co swelling and redness R leg, no joint pains.  All other ROS is negative.      Past Medical History:   Diagnosis Date    Medial meniscus tear     MRSA bacteremia     left knee    Pneumonia        Past Surgical History:   Procedure Laterality Date    CYSTOSCOPY N/A 4/23/2024    CYSTOSCOPY URETHRAL DILATATION WITH LAWLER PLACEMENT performed by Eliud Cunningham MD at [facility] OR    KNEE SURGERY Right     KNEE SURGERY Left 
[facility]    Room # 1108/1108-01   Name: Demarco Wilson            Taoist: Holiness     Reason for visit: Follow up    I visited the patient.    Admit Date & Time: 4/22/2024  7:08 AM    Assessment:  Demarco Wilson is a 53 y.o. male in the hospital because Pneumonia. Upon entering the room Pt. Was sleeping  while Significant Other was very receptive and seemed pleased with visit by Writer.  Writer discussed information about possible Officiants for desired nuptials with Significant Other, who seemed very pleased with  provided (including computer printout of possible providers).  Pt. Woke up and seemed pleased with Writer's presence and even shared a funny story following the Lord's Prayer.  Closing discussion with Significant Other was about her background in Higher Education.        Intervention:  I introduced myself and my title as  I offered space for discussion about where and how to find information about posssible wedding officiants  to express feelings, needs, and concerns and provided a ministry presence. In providing website officiant information.       05/02/24 1647   Encounter Summary   Encounter Overview/Reason Spiritual/Emotional Needs  (Assistance in Locating an Officiant for Desired Nuptials)   Service Provided For Patient;Significant other   Referral/Consult From Other    Support System Significant other   Last Encounter  05/02/24   Complexity of Encounter Low   Begin Time 1615   End Time  1645   Total Time Calculated 30 min   Spiritual/Emotional needs   Type Spiritual Support  (Information as to how to locate Officiant for Desired Nuptials)   Assessment/Intervention/Outcome   Assessment Calm;Peaceful;Hopeful;Other (Comment)  (Pleased to receive information for purpose of locating local officiant for desired marriage)   Intervention Active listening;Prayer (assurance of)/[place];Explored/Affirmed feelings, thoughts, 
SPIRITUAL CARE DEPARTMENT [facility]  PROGRESS NOTE    Room # 1108/1108-01   Name: Demarco Wilson            Jewish: Baptism     Reason for visit:  Rounding & to Congratulate on Marriage!    I visited the patient and wife.    Admit Date & Time: 4/22/2024  7:08 AM    Assessment:  Demarco Wilson is a 53 y.o. male in the hospital because Pneumonia due to infectious organism. Upon entering the room; the patient was laying in the bed and the wife was standing up in the room handling important business.       Intervention:  I introduced myself and my title as  I offered space for the patient  to express feelings, needs, and concerns and provided a ministry presence. The  gave congratulations to the couple on their marriage within the room. The couple was very glad the  stopped by, and they chatted for a moment and the  ate a piece of cake per their request. The  left with encouraging words.    Outcome:  The couple was so glad the  stopped by to celebrate with them.    Plan:  Chaplains will remain available to offer spiritual and emotional support as needed.    Electronically signed by Chaplain Candy, on 5/3/2024 at 7:18 PM.  Spiritual Care Department  [facility]     
SPIRITUAL CARE DEPARTMENT [facility]  PROGRESS NOTE    Room # 1108/1108-01   Name: Demarco Wilson              Reason for visit: Routine    I visited the patient.    Admit Date & Time: 4/22/2024  7:08 AM    Assessment:  Demarco Wilson is a 53 y.o. male.  Upon entering the room patient states about their medical condition, states struggles with their medical situation. States worries, fears frustrations. Patient states well , treated well. Patient states good family support, shares about spiritual life, Orthodoxy background, shares Orthodoxy beliefs. Patient shares about outside interests.    Intervention:   provided a ministry presence, listening and prayer.    Outcome:  Patient open to visit.     Plan:  Chaplains will remain available to offer spiritual and emotional support as needed.    Electronically signed by Chaplain Rossy, on 4/23/2024 at 1:13 PM.  Spiritual Care Department  [facility]      04/23/24 1309   Encounter Summary   Service Provided For: Patient and family together   Referral/Consult From: Rounding;Nurse   Support System Significant other   Last Encounter  04/23/24   Complexity of Encounter Moderate   Begin Time 1115   End Time  1135   Total Time Calculated 20 min   Grief, Loss, and Adjustments   Type New Diagnosis;Life Adjustments;Adjustment to illness   Advance Care Planning   Type ACP conversation   Assessment/Intervention/Outcome   Assessment Calm;Coping;Hopeful   Intervention Active listening;Discussed illness injury and it’s impact;Discussed belief system/Orthodoxy practices/tess;Prayer (assurance of)/[place];Sustaining Presence/Ministry of presence   Outcome Engaged in conversation;Expressed feelings, needs, and concerns;Expressed Gratitude;Receptive       
SPIRITUAL CARE DEPARTMENT [facility]  PROGRESS NOTE    Room # 1108/1108-01   Name: Demarco Wilson            Christianity: Yarsanism    Reason for visit:  Rounding    I visited the family (patient was sleep).    Admit Date & Time: 4/22/2024  7:08 AM    Assessment:  Demarco Wilson is a 53 y.o. male in the hospital because Pneumonia due to infectious organism. Upon entering the room The wife was quietly walking around; and the patient was in the bed asleep.   Intervention:  I introduced myself and my title as  I offered space for the wife  to express feelings, needs, and concerns and provided a ministry presence. The  asked the wife how the patient was doing, and she explained the situation. The , and wife prayed for the patient, and the  left with encouraging words for the wife.       Outcome:  The wife thanked the .     Plan:  Chaplains will remain available to offer spiritual and emotional support as needed.    Electronically signed by Chaplain Candy, on 5/6/2024 at 2:25 PM.  Spiritual Care Department  [facility]     
SPIRITUAL CARE DEPARTMENT [facility]  PROGRESS NOTE    Room # 1108/1108-01   Name: Demarco Wilson            Religious: Amish     Reason for visit:  Rounding    I visited the patient and significant other (Saskia).    Admit Date & Time: 4/22/2024  7:08 AM    Assessment:  Demarco Wilson is a 53 y.o. male in the hospital because Risk of Elopement. Upon entering the room Katherine from Lab was taking the patients blood. Saskia, the patients significant partner for over 23 years was sitting in a chair being supportive. The patient, Demarco, was laying in the bed, slightly elevated.       Intervention:  I introduced myself and my title as  I offered space for the patient and his partner  to express feelings, needs, and concerns and provided a ministry presence. The  had a gil conversation with Saskia (the patients significant partner); as the  finished taking the patients blood. The  asked what belief system the patient had, and Saskia responded, \"Amish\", and that she was \"Jainism\". Saskia told the  a beautiful story concerning her colleague, and their friends coming over, and cleaning up their lawn, and doing landscaping at their residence. The patient, Demarco, commented that we should pray for their friends who helped with lawn care. The  prayed for the patient, and for the couples friends who had helped with the landscaping. The couple thanked the  as she left the room with encouraging words. A beautiful couple.      Outcome:  The patient and his significant partner, Saskia thanked the  for the visit.     Plan:  Chaplains will remain available to offer spiritual and emotional support as needed.    Electronically signed by Chaplain Candy, on 4/29/2024 at 12:16 PM.  Spiritual Care Department  [facility]     
SPIRITUAL CARE DEPARTMENT [facility]  PROGRESS NOTE    Room # 1108/1108-01   Name: Demarco Wilson            Jew: Confucianism     Reason for visit: Follow up, Rounding.    I visited the patient.    Admit Date & Time: 4/22/2024  7:08 AM    Assessment:  Demarco Wilson is a 53 y.o. male in the hospital because Pneumonia due to infectious organism. Upon entering the room the patient was laying in bed, sitting up. The nurse was in the room.      Intervention:  I introduced myself and my title as  I offered space for the patient  to express feelings, needs, and concerns and provided a ministry presence. The patient remembered the  from her visit yesterday. The  asked where his significant other was at, and he said home with the dog. The  asked how she could help the patient, if he would like prayer again; Demarco said, \"Yes\". The  prayed, and then asked was there anything else the patient needed; Demarco said he was thirsty. The  left the room to find out through his attending nurse if he could have ice chips, and then popped back in to leave him with encouraging words.     Outcome:  Patient thanked the , and so did the nurse.    Plan:  Chaplains will remain available to offer spiritual and emotional support as needed.    Electronically signed by Chaplain Candy, on 4/30/2024 at 8:53 PM.  Spiritual Care Department  [facility]     
Spoke to MD Cunningham with urology regarding the 2 attempts to insert the smalls catheter. Patient was bladder scanned and it showed 553 mL retained pt unable to pass urine following many attempts and states he does not feel the urge. Two attempts to place smalls catheter both a coude and standard were used and neither were successful, pt only got maybe 20 - 30 mL out during procedure .MD Cunningham plans to come see the patient ASAP.  
Spoke with MD Falk, He state patient was accepted at [facility] earlier in the day. Reaching out to house supervisor, latest update in transfer encounter states that the [facility] is still waiting for their hepatologist to call them back.  
Treatment time: 45 minutes    Net UF: 0 mL    Pre weight: 143.1 kg  Post weight: 143.1 kg  EDW: TBD    Access used: CVC R chest  Access function: good    Medications or blood products given: albumin 25 g    Regular outpatient schedule: TBD    Summary of response to treatment: Patient tolerated treatment poorly, treatment terminated after 45 minutes due to symptomatic hypotension, albumin and midodrine given during treatment, poornima-synephrine started by ICU RN at end of treatment, Dr. Mills notified and new orders received to start CRRT, report given to DEANA Ureña.    Copy of dialysis treatment record placed in chart, to be scanned into EMR.  
Updated MD Buitrago to patients latest VBG results.  
Wound Ostomy Continence Nursing  Follow Up Visit      NAME:  Demarco Wilson  MEDICAL RECORD NUMBER:  [id number]  AGE: 53 y.o.   GENDER: male  : 1970  TODAY'S DATE:  2024    Subjective        Follow up visit for mgmt of RLE cellulitis with open wounds. The patient's wife is present during this visit. ID rounding during this visit and voiced concerns regarding pressure to right lateral calf from positioning causing further damage to ulcerated areas. The patient typically positions his leg laterally rotated, which puts direct pressure on the most affected area of ulceration. Pressure is causing worsening.    Review of Systems:  Constitutional: negative for chills and fevers  Respiratory: negative for cough and shortness of breath  Cardiovascular: positive for lower extremity edema, negative for chest pain and palpitations  Gastrointestinal: negative for abdominal pain and nausea  Genitourinary:negative  Integument: RLE ulcerations  Endocrine: negative  Musculoskeletal:negative, debility  Behavioral/Psych: negative  Pain: with dressing change      Objective     Vitals:  /67   Pulse (!) 105   Temp 97.6 °F (36.4 °C) (Oral)   Resp 19   Ht 1.88 m (6' 2\")   Wt (!) 143.1 kg (315 lb 7.7 oz)   SpO2 96%   BMI 40.50 kg/m²    TEMPERATURE:  Current - Temp: 97.6 °F (36.4 °C); Max - Temp  Av.3 °F (36.3 °C)  Min: 97 °F (36.1 °C)  Max: 97.8 °F (36.6 °C)    Kishan Risk Score Kishan Scale Score: 13    INTAKE/OUTPUT:      Intake/Output Summary (Last 24 hours) at 2024 1312  Last data filed at 2024 0504  Gross per 24 hour   Intake 377.29 ml   Output 650 ml   Net -272.71 ml                 Physical Exam:  General Appearance: alert and oriented to person, place and time, well-developed and well-nourished, in no acute distress  Head: normocephalic and atraumatic  Pulmonary/Chest: normal air movement, no respiratory distress  Skin: RLE with multiple ulcerations with varying depths  Cardiovascular/Circulation: 
Writer contacted Dr. Falk about patients condition and decline to transfer pt to ICU status. Awaiting response.   
Writer got patient up to chair with assistance. Patient has tolerated throughout the shift.   
Writer informed Dr. Gallardo about decreased u/o - 75 mL's. Awaiting response.   
Writer messaged MD Gordon, pt had 2 of 2 positive blood cultures with gram negative rods detecting e.coli  
Writer took over patient's care from PCU RN when BP and O2 dopped during HD. Writer contacted Dr Hein, received orders for poornima-synephrine. Titrated per order. Patient's BP continued to drop rapidly, taken off HD, orders received from Dr Mills to start CRRT, run even. CRRT started, ran for half hour when writer got call that patient had bed at [facility]. Patient taken off CRRT, HD line sodium citrate locked.   
Writer was going to call patients girlfriend at this time regarding him going to surgery and he requested that I do not do that as she is sleeping and it is late.  
ALKPHOS 72 04/25/2024 03:40 AM    AST 39 04/25/2024 03:40 AM    ALT 35 04/25/2024 03:40 AM     BMP:    Lab Results   Component Value Date/Time     04/30/2024 03:57 AM    K 4.0 04/30/2024 03:57 AM    CL 97 04/30/2024 03:57 AM    CO2 25 04/30/2024 03:57 AM    BUN 66 04/30/2024 03:57 AM    CREATININE 1.1 04/30/2024 03:57 AM    CALCIUM 8.6 04/30/2024 03:57 AM    LABGLOM 80 04/30/2024 03:57 AM    GLUCOSE 101 04/30/2024 03:57 AM     Sodium:    Lab Results   Component Value Date/Time     04/30/2024 03:57 AM     Potassium:    Lab Results   Component Value Date/Time    K 4.0 04/30/2024 03:57 AM     BUN/Creatinine:    Lab Results   Component Value Date/Time    BUN 66 04/30/2024 03:57 AM    CREATININE 1.1 04/30/2024 03:57 AM     Hepatic Function Panel:    Lab Results   Component Value Date/Time    ALKPHOS 72 04/25/2024 03:40 AM    ALT 35 04/25/2024 03:40 AM    AST 39 04/25/2024 03:40 AM    PROT 5.3 04/25/2024 03:40 AM    BILITOT 4.8 04/25/2024 03:40 AM    BILIDIR 1.4 10/05/2023 09:35 AM    IBILI 1.8 10/05/2023 09:35 AM     PT/INR:    Lab Results   Component Value Date/Time    PROTIME 29.1 04/30/2024 03:57 AM    INR 2.8 04/30/2024 03:57 AM         Assessment:   52 yo male with decompensated cirrhosis from etoh sober since Sept 2023. Admitted due to RLE pain, changes of skin found to have cellulitis following a traumatic foot injury, fevers, acute hypoxic respiratory insufficiency on supplemental O2, MACARIO on CKD, right sided pleural effusion, s/p thoracentesis on 4/22 with 950 cc removed. Has etoh cirrhosis sober since September. Has followed Dr. Caraballo in the office. Recent septic left knee prepatellar bursitis secondary to methicillin-resistant Staphylococcus aureus, Patient underwent left knee prepatellar irrigation and debridement with bursectomy 9/29/2023, completed a course of linezolid.   He is continued on IV cefazolin for e. Coli in blood, ID is following, as is Pulmonary as is Nephrology.      He has ascites, 
abdomen pelvis  1. Cirrhotic liver morphology with sequela of portal hypertension.  2. Moderate right and small left pleural effusions with moderate body wall  edema.      X-ray chest: 4/27/2024  Improving pulmonary edema    X-ray chest: 4/26/2024  Worsening pulmonary edema    CT abdomen pelvis 4/22    1. Moderate-sized right pleural effusion with near total atelectasis of the  right lower lobe and partial atelectasis of the right middle lobe.  2. Trace left pleural effusion.  3. Cirrhotic morphology of the liver with sequela portal hypertension  manifested by ascites and splenomegaly.  4. Anasarca.  Ultrasound of scrotum  Severe scrotal edema which limits examination.  Arterial Doppler flow cannot  be confirmed within the right testicle.     No evidence of testicular mass     Normal Doppler flow within the left testicle.  Ultrasound liver  Markedly limited exam secondary to large volume ascites and body habitus.     Cirrhotic morphology of the liver.     Mildly hydropic gallbladder with gallbladder wall thickening.  This finding  is likely secondary to concomitant liver disease, however if there is concern  for a cholecystitis.  Consider HIDA exam.    Impression/recommendations:    Acute hypoxic respiratory failure  Supplemental oxygen as needed to maintain oxygen saturation >90%  Incentive spirometer q1h while awake   Currently oxygen flow rate at 3 L per      Sepsis/right lower extremity cellulitis/septic shock,?  Hypovolemic component/E-coli bacteremia    Monitor blood pressure   IV cefazolin per Infectious disease following  Check paracentesis fluid cultures and cytology    MACARIO/rhabdomyolysis?/Hyponatremia  Monitor CPK  Fluid restriction/monitor sodium  Diuresis per nephrology on consult/Lasix 40 IV twice daily and Aldactone 25 daily    Right pleural effusion/pulmonary edema/atelectasis  Right thoracentesis 4/22/24 (-950 mL)  Fluid cytology negative for malignancy  Off IVF      Alcoholic liver 
by provider] heparin (porcine)  5,000 Units SubCUTAneous BID    sodium chloride flush  5-40 mL IntraVENous 2 times per day    dextrose bolus  250 mL IntraVENous Once    glucagon (rDNA)  1 mg IntraMUSCular Once       Data:  CBC:   Recent Labs     04/28/24 0325 04/29/24 0704 04/30/24 0357   WBC 20.6* 32.3* 40.6*   RBC 2.97* 3.12* 3.04*   HGB 11.0* 11.5* 11.2*   HCT 33.2* 34.2* 34.5*   .8* 109.6* 113.5*   RDW 14.7* 14.9* 15.0*   PLT 63* 83* 88*     BMP:   Recent Labs     04/28/24 0325 04/29/24 0704 04/30/24 0357   * 134* 131*   K 3.7 3.9 4.0   CL 97* 96* 97*   CO2 29 29 25   PHOS 2.9 3.0 3.8   BUN 54* 58* 66*   CREATININE 0.8 0.9 1.1     BNP: No results for input(s): \"BNP\" in the last 72 hours.  PT/INR:   Recent Labs     04/30/24 0357   PROTIME 29.1*   INR 2.8     APTT: No results for input(s): \"APTT\" in the last 72 hours.  CARDIAC ENZYMES: No results for input(s): \"CKMB\", \"CKMBINDEX\", \"TROPONINI\" in the last 72 hours.    Invalid input(s): \"CKTOTAL;3\"  FASTING LIPID PANEL:No results found for: \"CHOL\", \"HDL\", \"TRIG\"  LIVER PROFILE: No results for input(s): \"AST\", \"ALT\", \"ALB\", \"BILIDIR\", \"BILITOT\", \"ALKPHOS\" in the last 72 hours.     Assessment/Plan:  Principal Problem:    Pneumonia due to infectious organism  Active Problems:    Cellulitis of right lower extremity    Bacteremia    Sepsis associated hypotension (HCC)  Resolved Problems:    * No resolved hospital problems. *  Patient is off the pressors maintaining his vitals  He had paracentesis done this morning  History of right thoracentesis with removal of 950 mL nonmalignant fluid.  Follow-up CT scan shows moderate recurrence  Cellulitis left lower extremity.  Patient is on Keflex and coordination with infectious disease  Alcoholic cirrhosis with ascites.  Patient is placed on Raplixa been, thiamine, folic acid.  Patient had paracentesis done to rule out SBP  Constipation.  He is placed on MiraLAX  MACARIO, anasarca.  He is on Aldactone, Lasix, 
cellulitis/septic shock,?  Hypovolemic component/E-coli bacteremia  Off IV fluid  Off Levophed and Sonny-Synephrine  Right lower extremity negative for DVT  IV cefepime    MACARIO/rhabdomyolysis?    Elevated CK  Monitor right leg swelling  Bicarb drip stopped per nephrology  Nephrology on consult    Right pleural effusion/pulmonary edema  Right thoracentesis 4/22/24  Fluid cytology negative for malignancy  Off IVF  Repeat x-ray chest: Improving pulm edema, s/p 2 doses of Diamox and 1 dose of Lasix on 4/26/2024    Ascites/ETOH abuse/hyperbilirubinemia  Thiamine and folic acid    Severe urethral stricture  Status post urethrotomy with catheter placed  Urology managing    DVT prophylaxis with EPC cuffs  Will follow with you    Electronically signed by     Laquita Hein MD on 4/27/2024 at 1:01 PM  Pulmonary Critical Care and Sleep Medicine,  [facility]  Office: [phone]      
mouth 2 times daily (with meals) 2/29/24   Fuentes Falk MD   furosemide (LASIX) 40 MG tablet Take 1 tablet by mouth daily 2/29/24   Fuentes Falk MD   spironolactone (ALDACTONE) 100 MG tablet Take 1 tablet by mouth daily 2/29/24   Fuentes Falk MD   magnesium oxide (MAG-OX) 400 MG tablet Take 1 tablet by mouth daily 10/17/23   Fuentes Falk MD       Scheduled Meds:   polyethylene glycol  17 g Oral Daily    docusate sodium  100 mg Oral BID    albumin human 25%  25 g IntraVENous Q6H    rifAXIMin  400 mg Oral TID    ceFAZolin  2,000 mg IntraVENous Q8H    folic acid  1 mg Oral Daily    thiamine mononitrate  100 mg Oral Daily    [Held by provider] heparin (porcine)  5,000 Units SubCUTAneous BID    sodium chloride flush  5-40 mL IntraVENous 2 times per day    dextrose bolus  250 mL IntraVENous Once    glucagon (rDNA)  1 mg IntraMUSCular Once     Continuous Infusions:   furosemide (LASIX) 100 mg in sodium chloride 0.9 % 100 mL infusion 10 mg/hr (05/01/24 1756)    sodium chloride Stopped (04/30/24 1559)    dextrose       PRN Meds:sodium chloride flush, calcium carbonate, sodium chloride flush, sodium chloride, acetaminophen, ondansetron **OR** ondansetron, glucose, dextrose bolus **OR** dextrose bolus, glucagon (rDNA), dextrose, morphine    No Known Allergies    Social History     Socioeconomic History    Marital status: Single     Spouse name: Not on file    Number of children: Not on file    Years of education: Not on file    Highest education level: Not on file   Occupational History    Not on file   Tobacco Use    Smoking status: Never    Smokeless tobacco: Former     Types: Chew     Quit date: 9/23/2023   Vaping Use    Vaping Use: Never used   Substance and Sexual Activity    Alcohol use: Yes     Alcohol/week: 42.0 standard drinks of alcohol     Types: 42 Cans of beer per week    Drug use: Never    Sexual activity: Yes     Partners: Female   Other Topics Concern    Not on file   Social History Narrative    Not on 
y.o.-year-old male who was initially admitted on 4/22/2024.  However is morbidly obese, has alcohol abuse and is known to our practice from recent hospitalization in September 2023.  At that time he developed acute onset left knee pain, was initially diagnosed with cellulitis and failed outpatient oral antibiotic therapy.  He was then admitted and found to have left knee septic prepatellar bursitis secondary to MRSA.  He underwent left knee prepatellar irrigation and debridement with bursectomy on 9/29/2023 and discharged on a 7-day course of linezolid.     Patient presented to the emergency department 4/22/2024 stating that he dropped a log on his right foot about 8 days prior.  Patient tells me that last Saturday he dropped a log on his right foot that fell on the middle 3 toes.  It immediately started to bleed. Since then he has had worsening pain, swelling and redness of the right foot and this past Sunday he started to have redness, swelling and clear drainage from the right leg extending to the shin.  He actually reports increased swelling to bilateral lower extremities, right is greater than left.  The patient tells me that he was also started on lactulose that he started over the weekend and since then has been experiencing diarrhea.  He has not had any associated abdominal pain.  He has also been having coughing spells that are so bad they have caused him to vomit about 3 times.  The cough has been present since the fall but it is progressively gotten worse over the last month.  He does not recall any generalized shortness of breath except when he gets into his coughing spells.  He does not recall any change in his urinary habits, including increased urination, difficulty urinating, change in flow or dysuria.  He did not report any chills at home but Tmax was around 101 prior to admission on 2 occasions.  In the emergency department his potassium was 5.8, creatinine 2.7, lactate 9.1, troponin 51, ALT 46, 
  Social History Narrative    Not on file     Social Determinants of Health     Financial Resource Strain: Low Risk  (10/17/2023)    Overall Financial Resource Strain (CARDIA)     Difficulty of Paying Living Expenses: Not hard at all   Food Insecurity: No Food Insecurity (4/22/2024)    Hunger Vital Sign     Worried About Running Out of Food in the Last Year: Never true     Ran Out of Food in the Last Year: Never true   Transportation Needs: No Transportation Needs (4/22/2024)    PRAPARE - Transportation     Lack of Transportation (Medical): No     Lack of Transportation (Non-Medical): No   Physical Activity: Not on file   Stress: Not on file   Social Connections: Not on file   Intimate Partner Violence: Not on file   Housing Stability: Low Risk  (4/22/2024)    Housing Stability Vital Sign     Unable to Pay for Housing in the Last Year: No     Number of Places Lived in the Last Year: 1     Unstable Housing in the Last Year: No       Family History   Problem Relation Age of Onset    High Blood Pressure Father        Physical Exam:  Vitals:    05/02/24 0500 05/02/24 0506 05/02/24 0530 05/02/24 0732   BP:       Pulse: (!) 106  (!) 105    Resp: 20  19    Temp:    97.8 °F (36.6 °C)   TempSrc:    Oral   SpO2: 91%  96%    Weight:  (!) 143.1 kg (315 lb 7.7 oz)     Height:         I/O last 3 completed shifts:  In: 853.3 [P.O.:716; I.V.:28.1; IV Piggyback:109.2]  Out: 1525 [Urine:1525]    General:  Awake, alert, not in distress. Appears to be stated age. Anasarca  HEENT: Atraumatic, normocephalic. + icteric sclera. Pink and moist oral mucosa. No carotid bruit. No JVD.  Chest: Bilateral air entry, clear to auscultation, no wheezing, rhonchi or rales.  Cardiovascular:S1S2, no murmur, rub or gallop. Has significant lower extremity edema.    Abdomen: Soft, non tender to palpation. Active bowel sounds. Ascities. Scrotal edema++++  Musculoskeletal: Active ROM x 4 extremities. No cyanosis or clubbing. R leg swelling with blisters and 
05/03/2024     CMP:   Lab Results   Component Value Date/Time     05/05/2024 05:52 AM    K 4.6 05/05/2024 05:52 AM    CL 90 05/05/2024 05:52 AM    CO2 22 05/05/2024 05:52 AM     05/05/2024 05:52 AM    CREATININE 3.8 05/05/2024 05:52 AM    GLUCOSE 118 05/05/2024 05:52 AM    CALCIUM 9.6 05/05/2024 05:52 AM    BILITOT 9.6 05/03/2024 09:39 AM    ALKPHOS 71 05/03/2024 09:39 AM    AST 31 05/03/2024 09:39 AM    ALT <5 05/03/2024 09:39 AM      Hepatic:   Lab Results   Component Value Date    AST 31 05/03/2024    AST 39 04/25/2024    AST 57 (H) 04/24/2024    ALT <5 (L) 05/03/2024    ALT 35 04/25/2024    ALT 43 (H) 04/24/2024    BILITOT 9.6 (H) 05/03/2024    BILITOT 4.8 (H) 04/25/2024    BILITOT 5.1 (H) 04/24/2024    ALKPHOS 71 05/03/2024    ALKPHOS 72 04/25/2024    ALKPHOS 56 04/24/2024     BNP: No results found for: \"BNP\"  Lipids: No results found for: \"CHOL\", \"HDL\"  INR:   Lab Results   Component Value Date    INR 2.8 04/30/2024    INR 2.9 04/27/2024    INR 2.7 04/22/2024     PTH: No results found for: \"PTH\"  Phosphorus:    Lab Results   Component Value Date/Time    PHOS 6.9 05/05/2024 05:52 AM     Ionized Calcium: No results found for: \"IONCA\"  Magnesium:   Lab Results   Component Value Date/Time    MG 2.7 05/05/2024 05:52 AM     Albumin: No results found for: \"LABALBU\"  Last 3 CK, CKMB, Troponin: @LABRCNT(CKTOTAL:3,CKMB:3,TROPONINI:3)       URINE:)  Lab Results   Component Value Date/Time    NAUR <20 05/02/2024 12:20 PM        Radiology:   Reviewed.    Assessment:  Acute kidney injury. Creatinine is rising. Likely has hepatorenal syndrome.  Hyponatremia,   Hyperkalemia, secondary to MACARIO.   Hyperphosphatemia.  Metabolic acidosis, improved.  Anemia.  E coli sepsis.  Alcoholic cirrhosis with ascites s/p paracentesis 4/29/24. He quit drinking alcohol in Sept 2023.   Anasarca.     Plan:  Discussed starting dialysis with the patient  He is agreeable to proceed with the procedure  Will need a temporary dialysis 
Thoracentesis    Alcoholic liver cirrhosis/ascites/anasarca/hyperbilirubinemia/coagulopathy  Thiamine and folic acid  Claims he has stopped alcohol since September 2023  GI follow-up/liver transplant evaluation    Severe urethral stricture s/p cystoscopy with urethral dilatation and Cifuentes catheter placement 4/23/2024  Urology continuing to follow        Physical therapy  Discussed with RN  Peptic ulcer disease prophylaxis  DVT prophylaxis    Electronically signed by   Steve Buitrago MD on 5/1/2024 at 1:08 PM  Pulmonary Critical Care and Sleep Medicine,  [facility]  Office: [phone]      
created with a voice recognition program and while intend to generate a document that accurately reflects the content of the visit, no guarantee can be provided that every mistake has been identified and corrected by editing.          Fuentes Falk MD, MD 5/1/2024 12:50 PM          
malignancy  Off IVF      Alcoholic liver cirrhosis/ascites/anasarca/hyperbilirubinemia/coagulopathy  Thiamine and folic acid  Claims he has stopped alcohol since September 2023  GI follow-up/liver transplant evaluation    Severe urethral stricture s/p cystoscopy with urethral dilatation and Cifuentes catheter placement 4/23/2024  Urology continuing to follow        Physical therapy  Discussed with RN  Discussed with wife again today at bedside  Transfer to [facility] per primary; awaiting insurance authorization  Peptic ulcer disease prophylaxis  DVT prophylaxis    Electronically signed by   Steve Buitrago MD on 5/4/2024 at 3:52 PM  Pulmonary Critical Care and Sleep Medicine,  [facility]  Office: [phone]      
room  This note is created with a voice recognition program and while intend to generate a document that accurately reflects the content of the visit, no guarantee can be provided that every mistake has been identified and corrected by editing.        Fuentes Falk MD, MD 4/27/2024 2:16 PM          
4/29/24. He quit drinking alcohol in Sept 2023.   Anasarca.     Plan:  Decrease Lasix drip to 10 mg/h   Most likely will require dialysis if renal function continues to get worse and continued to require volume removal   continue Aldactone at 100 mg and okay  Will give 2 more doses of  Okay to take protein supplement   Recommend fluid reduction for free water 1000 ml/24 hours.  antibiotics as per ID service.  Avoid hypotension, nephrotoxic drugs, Lovenox, Fleets enema and IV contrast exposure.  Follow up chemistries ordered for AM.  Spoke with his wife and RN.  Process for an inpatient transfer to [facility] for possible liver transplant in progress.    Please do not hesitate to contact us for any further questions/concerns. We will continue to follow along with you.     Electronically signed by Leticia Gallardo MD  on 5/4/2024 at 11:56 AM   [facility].  Ph: [phone]      
Exceptions  Arousal/Alertness: Appropriate responses to stimuli  Following Commands: Follows all commands without difficulty  Attention Span: Appears intact  Memory: Appears intact  Safety Judgement: Decreased awareness of need for assistance;Decreased awareness of need for safety  Problem Solving: Decreased awareness of errors;Assistance required to identify errors made;Assistance required to correct errors made  Insights: Decreased awareness of deficits  Initiation: Requires cues for some  Orientation  Overall Orientation Status: Within Functional Limits  Orientation Level: Oriented X4  Perception  Overall Perceptual Status: WFL               Education Given To: Patient  Education Provided: Role of Therapy;Plan of Care;Home Exercise Program;Precautions;ADL Adaptive Strategies;Transfer Training;Equipment;Family Education;Energy Conservation;Fall Prevention Strategies  Education Provided Comments: use of call light, B UE ROM, positioning techs, pressure relief  Education Method: Demonstration;Verbal  Barriers to Learning: Other (Comment) (pain, lethargy)  Education Outcome: Continued education needed;Verbalized understanding                                            AM-PAC - ADL  AM-PAC Daily Activity - Inpatient   How much help is needed for putting on and taking off regular lower body clothing?: Total  How much help is needed for bathing (which includes washing, rinsing, drying)?: A Lot  How much help is needed for toileting (which includes using toilet, bedpan, or urinal)?: Total  How much help is needed for putting on and taking off regular upper body clothing?: A Lot  How much help is needed for taking care of personal grooming?: A Little  How much help for eating meals?: A Little  AM-[facility] Inpatient Daily Activity Raw Score: 12  AM-PAC Inpatient ADL T-Scale Score : 30.6  ADL Inpatient CMS 0-100% Score: 66.57  ADL Inpatient CMS G-Code Modifier : CL           Goals  Short Term Goals  Time Frame for Short Term 
oral mucosa. No carotid bruit. No JVD.  Chest: Bilateral air entry, clear to auscultation, no wheezing, rhonchi or rales.  Cardiovascular: RRR, S1S2, no murmur, rub or gallop. Has lower extremity edema.    Abdomen: Soft, non tender to palpation. Active bowel sounds x 4 quadrants. Ascities  Musculoskeletal: Active ROM x 4 extremities. No cyanosis or clubbing. R leg swelling with blisters and redness. 4+ LLE edema  Integumentary: Pink, warm and dry. Free from rash or lesions. Skin turgor normal.  CNS: Oriented to person, place and time. Speech clear. Face symmetrical. No tremor.     Data:  CBC:   Lab Results   Component Value Date    WBC 32.6 (HH) 05/03/2024    HGB 10.0 (L) 05/03/2024    HCT 30.5 (L) 05/03/2024    .5 (H) 05/03/2024    PLT 94 (L) 05/03/2024     BMP:    Lab Results   Component Value Date     (L) 05/03/2024     (L) 05/02/2024     (L) 05/01/2024    K 4.8 05/03/2024    K 3.9 05/02/2024    K 3.7 05/01/2024    CL 92 (L) 05/03/2024    CL 92 (L) 05/02/2024    CL 95 (L) 05/01/2024    CO2 25 05/03/2024    CO2 26 05/02/2024    CO2 28 05/01/2024    BUN 95 (HH) 05/03/2024    BUN 85 (H) 05/02/2024    BUN 77 (H) 05/01/2024    CREATININE 1.7 (H) 05/03/2024    CREATININE 1.4 (H) 05/02/2024    CREATININE 1.2 05/01/2024    GLUCOSE 107 (H) 05/03/2024    GLUCOSE 99 05/02/2024    GLUCOSE 112 (H) 05/01/2024     CMP:   Lab Results   Component Value Date/Time     05/03/2024 05:27 AM    K 4.8 05/03/2024 05:27 AM    CL 92 05/03/2024 05:27 AM    CO2 25 05/03/2024 05:27 AM    BUN 95 05/03/2024 05:27 AM    CREATININE 1.7 05/03/2024 05:27 AM    GLUCOSE 107 05/03/2024 05:27 AM    CALCIUM 9.4 05/03/2024 05:27 AM    BILITOT 4.8 04/25/2024 03:40 AM    ALKPHOS 72 04/25/2024 03:40 AM    AST 39 04/25/2024 03:40 AM    ALT 35 04/25/2024 03:40 AM      Hepatic:   Lab Results   Component Value Date    AST 39 04/25/2024    AST 57 (H) 04/24/2024    AST 97 (H) 04/23/2024    ALT 35 04/25/2024    ALT 43 (H) 04/24/2024 
04/24/2024 06:05 AM    ALKPHOS 56 04/24/2024 06:05 AM    AST 57 04/24/2024 06:05 AM    ALT 43 04/24/2024 06:05 AM      Hepatic:   Lab Results   Component Value Date    AST 57 (H) 04/24/2024    AST 97 (H) 04/23/2024    AST 59 (H) 04/22/2024    ALT 43 (H) 04/24/2024    ALT 61 (H) 04/23/2024    ALT 46 (H) 04/22/2024    BILITOT 5.1 (H) 04/24/2024    BILITOT 8.3 (H) 04/23/2024    BILITOT 9.3 (H) 04/22/2024    ALKPHOS 56 04/24/2024    ALKPHOS 74 04/23/2024    ALKPHOS 107 04/22/2024     BNP: No results found for: \"BNP\"  Lipids: No results found for: \"CHOL\", \"HDL\"  INR:   Lab Results   Component Value Date    INR 2.7 04/22/2024    INR 3.9 04/17/2024    INR 1.8 10/05/2023     PTH: No results found for: \"PTH\"  Phosphorus:    Lab Results   Component Value Date/Time    PHOS 5.9 04/24/2024 06:05 AM     Ionized Calcium: No results found for: \"IONCA\"  Magnesium:   Lab Results   Component Value Date/Time    MG 1.8 04/24/2024 06:05 AM     Albumin: No results found for: \"LABALBU\"  Last 3 CK, CKMB, Troponin: @LABRCNT(CKTOTAL:3,CKMB:3,TROPONINI:3)       URINE:)No results found for: \"NAUR\", \"PROTUR\"     Radiology:   Reviewed.    Assessment:  Acute kidney injury, appears to be hemodynamically related. Creatinine is elevated but stable.  Hyponatremia.  Hyperkalemia, secondary to MACARIO.  Metabolic acidosis.  Anemia.  Sepsis.  Alcoholic cirrhosis.  Anasarca.     Plan:  Bicarb level is up to 29, stop IV bicarb infusion, change to 0.9% NS at 75 ml/hr  Urine studies appear consistent with a UTI and hemodynamic MACARIO, possible HRS, no ATN at this time  Weaning pressors as BP tolerates  Antibiotics as per ID service.  Renal diet with oral fluid restriction of 1000 ml/24 hours.   Monitor BP.  Continue albumin 25g IV q 6 hours for now  K and Na are improved  D/W patient and family at the bedside, no RRT needed yet today, but may need to consider if GFR continues to decline  Cifuentes is in place, dark hari urine present  Avoid hypotension, nephrotoxic 
04/25/2024    ALKPHOS 56 04/24/2024    ALKPHOS 74 04/23/2024     BNP: No results found for: \"BNP\"  Lipids: No results found for: \"CHOL\", \"HDL\"  INR:   Lab Results   Component Value Date    INR 2.7 04/22/2024    INR 3.9 04/17/2024    INR 1.8 10/05/2023     PTH: No results found for: \"PTH\"  Phosphorus:    Lab Results   Component Value Date/Time    PHOS 4.5 04/25/2024 03:40 AM     Ionized Calcium: No results found for: \"IONCA\"  Magnesium:   Lab Results   Component Value Date/Time    MG 1.8 04/25/2024 03:40 AM     Albumin: No results found for: \"LABALBU\"  Last 3 CK, CKMB, Troponin: @LABRCNT(CKTOTAL:3,CKMB:3,TROPONINI:3)       URINE:)No results found for: \"NAUR\", \"PROTUR\"     Radiology:   Reviewed.    Assessment:  Acute kidney injury, appears to be hemodynamically related. Creatinine is improving.  Hyponatremia, improving.  Hyperkalemia, secondary to MACARIO, improved.  Metabolic acidosis, improved.  Anemia.  E coli sepsis.  Alcoholic cirrhosis.  Anasarca.     Plan:  Off IVF.  Urine studies appear consistent with a UTI and hemodynamic MACARIO, no ATN at this time.  Weaning pressors as BP tolerates. Monitor BP.  Antibiotics as per ID service.  Renal diet with oral fluid restriction of 1000 ml/24 hours.   D/W patient and family at the bedside, no RRT needed yet today, but may need to consider if GFR continues to decline.  Cifuentes is in place.  Avoid hypotension, nephrotoxic drugs, Lovenox, Fleets enema and IV contrast exposure.  Follow up chemistries ordered for AM.    Please do not hesitate to contact us for any further questions/concerns. We will continue to follow along with you.       Electronically signed by Noe Mills MD  on 4/25/2024 at 10:10 AM   [facility].  Ph: [phone]   
   thiamine mononitrate  100 mg Oral Daily    [Held by provider] heparin (porcine)  5,000 Units SubCUTAneous BID    sodium chloride flush  5-40 mL IntraVENous 2 times per day    dextrose bolus  250 mL IntraVENous Once    glucagon (rDNA)  1 mg IntraMUSCular Once       Electronically signed by Alan Gordon MD on 4/29/2024 at 10:45 AM      [facility]  Alan Gordon MD  [employer]  OFFICE: [phone]    Thank you for allowing us to participate in the care of this patient. Please call with questions.    This note is created with the assistance of a speech recognition program.  While intending to generate a document that actually reflects the content of the visit, the document can still have some errors including those of syntax and sound a like substitutions which may escape proof reading.  In such instances, actual meaning can be extrapolated by contextual diversion.    
        Specimen Collected: 04/22/24 07:25 EDT Last Resulted: 04/24/24 11:10 EDT        Culture, Urine [[id number]] Collected: 04/23/24 0503   Order Status: Completed Specimen: Urine, Cystoscopic Updated: 04/24/24 0732    Specimen Description .BLADDER URINE FROM CYSTOSCOPY    Culture NO GROWTH   MRSA DNA Probe, Nasal [[id number]] Collected: 04/22/24 2050   Order Status: Completed Specimen: Nasal Updated: 04/23/24 1511    Specimen Description .NASAL SWAB    MRSA, DNA, Nasal NEGATIVE    Comment: NEGATIVE:  MRSA DNA not detected by nucleic acid amplification.                                                   Results should be used as an adjunct to nosocomial control efforts to identify patients  needing enhanced precautions.    The test is not intended to identify patients with staphylococcal infections.  Results  should not be used to guide or monitor treatment for MRSA infections.      Culture with Smear, Acid Fast Bacillius [[id number]] Collected: 04/22/24 1230   Order Status: Completed Specimen: Pleural Fluid Updated: 04/23/24 0958    Specimen Description .PLEURAL FLUID RT    Direct Exam NO ACID FAST BACILLI SEEN (CONCENTRATED SMEAR)    Culture PENDING       Medications:      polyethylene glycol  17 g Oral Daily    pantoprazole  40 mg Oral QAM AC    sodium zirconium cyclosilicate  5 g Oral BID    spironolactone  100 mg Oral Daily    docusate sodium  100 mg Oral BID    rifAXIMin  400 mg Oral TID    folic acid  1 mg Oral Daily    thiamine mononitrate  100 mg Oral Daily    [Held by provider] heparin (porcine)  5,000 Units SubCUTAneous BID    sodium chloride flush  5-40 mL IntraVENous 2 times per day    dextrose bolus  250 mL IntraVENous Once    glucagon (rDNA)  1 mg IntraMUSCular Once       Electronically signed by Alan Gordon MD on 5/6/2024 at 8:30 AM      [facility]  Alan Gordon MD  [employer] messaging  OFFICE: [phone]    Thank you for allowing us to participate

## 2024-05-06 NOTE — CARE COORDINATION
DC Planning    Discussed case with LSW-will need PT/OT recs pending disposition. May need ARU vs SNF/HHC.     Pt is off pressors, discussed with ID most likely po abx on dc, pt down to 3l-does NOT wear at home.   
DC Planning    Spoke with pt and his s.o. at bedside, discussed will probably need rehab and possibly IV ABX. Ref sent to [facility]. NEED PT OT. Pt did get letter to dismiss from jury duty s.o. sent it.   
Discharge planning    Access called RN about transfer. No new messages from insurance regarding prior auth for transfer.     Call to [facility] transfer line at  [phone] . Spoke with Ela at transfer center.     They only need a authorization for the transfer itself NOT the inpatient stay.     Will need to check back on Monday with [facility]   
Discharge planning    [facility] needs prior auth before th patient can be transferred. [phone].    Spoke with Mikayla. Writer provided patient information and faxed the chart to [phone].    Case # is [id number]. Mikayla states it can take up to 3-5 days.    1345 - Notified [facility] again and spoke to Marianne Lopes expedited the case as urgent. Writer waiting on approval. They have writers cell number. Informed social work to  work cell phone for auth from [facility].     When auth is received, please contact [employer] access at [phone], to let them know that patient is approved to go to [facility].   
Discharge planning    Received a call from Ryan at [facility]. [phone]. Ext: [phone]. He approved for patient to be transferred to [facility]. There are 2 placeholder days to get the patient to [facility].     Case #  [id number].    Writer spoke with Brittani at [facility] and she is going to relay the message to a nurse and proceed with setting up the transfer..    Writer informed the ICU nurse.   
Discharge planning    Spoke with [facility]. They do not have approval yet. Will call back later today for status.  
Social work:  [facility] cannot accept d/t wounds.   Spoke to [facility] ARU, they want to see updated PT/OT notes to confirm if they can accept.  If accepted, will need precert.     UPDATE 16:36- c/b from [facility] ARU, at this point their doctors feel patient is more appropriate for SNF than ARU, but will continue to follow pt's progress with therapy.   Referrals also sent to [facility] and [address] [place] [address], [facility] and [facility].    Girlfriend called and updated- she is also interested in [facility] ARU if [facility] wont accept.   [facility] faxed referral to [phone]. Phone for [facility] is [phone].  
Social work:  Spoke to intake/[facility] their PMR doctor would like to see updated PT/OT notes from today to determine acceptance.   VM left for PT/OT to see.   [facility] also following.     
Social work: Jai has approved pt for Admission and can start precert if other top choices [facility] and [facility] are not approving yet. Will call both. Will need [place] at discharge and if pt going to [facility] will need discharge readmit. Michelle hart    Social work: received call from dr. Carla Tate -[phone]. She is hoping pt is going to do well enough for possible home care vs. ARU. Advised home care coordinator of same. Still she advised to keep checking for possible closest places [facility] and [facility] as primary choices.  Michelle hart    Social work NO is still following in case needed. Michelle hart  
Social work: Phone call from [facility] ARU, they are not able to accommodate patient.     [facility] following.  G/f is hopeful patient will improve enough to go home at discharge opposed to snf or aru.   Also referred to [facility].     UPDATE 15:55- [facility] is [facility].   
Social work: Phone call from Shona/Carlos, confirmed [facility] is in-network provider.   # for benefits/eligibility is [phone]; prior auth: [phone]. Call ref # [id number].  
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 POA Girlfriend will need a note proving he cannot come. She will bring in the paperwork from the court tomorrow. Michelle hart  
Social work; called girlfriend Saskia who is his top contact [phone]. Left message. [facility] is from [facility] the only snf is [facility]. If pt wants to try for ARU [facility] is the closest. Pt not ready for discharge today so will try again to reach girlfriend for what her wishes are for rehab. Will need [place] at discharge. Michelle hart    Social work girlfriend carried called back. 1. [facility] 2. [facility] either is ok and close for her. 3. [facility] 4. [facility]  and if snf needed [facility]. Will send referrals at her request to two top choices first to see who is in network with insurance. Then ok to try [facility] per girlfriend and poa. [facility] is very far for her. She might go [facility]. Sent both top two referrals. Will need [place] at discharge.Need PT/OT Michelle hart  
for Transition of Care is related to the following treatment goals of Hyperkalemia [E87.5]  MACARIO (acute kidney injury) (HCC) [N17.9]  Cellulitis of right lower extremity [L03.115]  Pneumonia of right lower lobe due to infectious organism [J18.9]  Pneumonia due to infectious organism [J18.9]    IF APPLICABLE: The Patient and/or patient representative Demarco and his family were provided with a choice of provider and agrees with the discharge plan. Freedom of choice list with basic dialogue that supports the patient's individualized plan of care/goals and shares the quality data associated with the providers was provided to:     Patient Representative Name:       The Patient and/or Patient Representative Agree with the Discharge Plan?      Earnestine Cifuentes RN  Case Management Department  Ph:  Fax:

## 2024-05-08 ENCOUNTER — TELEPHONE (OUTPATIENT)
Dept: INTERNAL MEDICINE CLINIC | Age: 54
End: 2024-05-08

## 2024-05-13 LAB
MICROORGANISM SPEC CULT: NORMAL
MICROORGANISM/AGENT SPEC: NORMAL
SPECIMEN DESCRIPTION: NORMAL

## 2024-05-20 LAB
MICROORGANISM SPEC CULT: NORMAL
MICROORGANISM/AGENT SPEC: NORMAL
SPECIMEN DESCRIPTION: NORMAL

## 2024-05-28 LAB
MICROORGANISM SPEC CULT: NORMAL
MICROORGANISM/AGENT SPEC: NORMAL
SPECIMEN DESCRIPTION: NORMAL

## 2024-06-03 LAB
MICROORGANISM SPEC CULT: NORMAL
MICROORGANISM/AGENT SPEC: NORMAL
SPECIMEN DESCRIPTION: NORMAL

## 2024-06-10 LAB
MICROORGANISM SPEC CULT: NORMAL
MICROORGANISM/AGENT SPEC: NORMAL
SPECIMEN DESCRIPTION: NORMAL

## (undated) DEVICE — BANDAGE COMPR W4INXL5YD WHT BGE POLY COT M E WRP WV HK AND

## (undated) DEVICE — STRIP WND CLSR W1 4XL4IN POLYAMIDE MACROPOROUS NONWOVEN H2O

## (undated) DEVICE — DRAPE,REIN 53X77,STERILE: Brand: MEDLINE

## (undated) DEVICE — TOWEL,OR,DSP,ST,BLUE,STD,4/PK,20PK/CS: Brand: MEDLINE

## (undated) DEVICE — SOLUTION IV 1000ML 0.9% SOD CHL PH 5 INJ USP VIAFLX PLAS

## (undated) DEVICE — DRAPE,U/ SHT,SPLIT,PLAS,STERIL: Brand: MEDLINE

## (undated) DEVICE — Device

## (undated) DEVICE — DRAINBAG,ANTI-REFLUX TOWER,L/F,2000ML,LL: Brand: MEDLINE

## (undated) DEVICE — CONTAINER,SPECIMEN,OR STERILE,4OZ: Brand: MEDLINE

## (undated) DEVICE — CATHETER,FOLEY,COUDE,LATEX,18FR,10ML: Brand: MEDLINE

## (undated) DEVICE — KNIFE,COLD,STERILE, SINGLE USE: Brand: N.A.

## (undated) DEVICE — TUBING, SUCTION, 1/4" X 12', STRAIGHT: Brand: MEDLINE

## (undated) DEVICE — GLOVE SURG SZ 85 L12IN FNGR THK79MIL GRN LTX FREE

## (undated) DEVICE — SUTURE PDS II SZ 2-0 L27IN ABSRB VLT L26MM CT-2 1/2 CIR Z333H

## (undated) DEVICE — GLOVE SURG SZ 8 CRM LTX FREE POLYISOPRENE POLYMER BEAD ANTI

## (undated) DEVICE — GAUZE,SPONGE,FLUFF,6"X6.75",STRL,5/TRAY: Brand: MEDLINE

## (undated) DEVICE — SPONGE LAP W18XL18IN WHT COT 4 PLY FLD STRUNG RADPQ DISP ST 2 PER PACK

## (undated) DEVICE — APPLICATOR MEDICATED 26 CC SOLUTION HI LT ORNG CHLORAPREP

## (undated) DEVICE — DRESSING PETRO W3XL8IN OIL EMUL N ADH GZ KNIT IMPREG CELOS

## (undated) DEVICE — SKIN PREP TRAY W/CHG: Brand: MEDLINE INDUSTRIES, INC.

## (undated) DEVICE — STAZ CYSTO: Brand: MEDLINE INDUSTRIES, INC.

## (undated) DEVICE — SMARTGOWN SURGICAL GOWN, 3XL, LONG: Brand: CONVERTORS

## (undated) DEVICE — GLOVE SURG SZ 7 CRM LTX FREE POLYISOPRENE POLYMER BEAD ANTI

## (undated) DEVICE — NDL CNTR 40CT FM MAG: Brand: MEDLINE INDUSTRIES, INC.

## (undated) DEVICE — BANDAGE COMPR W6INXL12FT SMOOTH FOR LIMB EXSANG ESMARCH

## (undated) DEVICE — YANKAUER,FLEXIBLE HANDLE,REGLR CAPACITY: Brand: MEDLINE INDUSTRIES, INC.

## (undated) DEVICE — TOWEL,OR,DSP,ST,BLUE,DLX,XR,4/PK,20PK/CS: Brand: MEDLINE

## (undated) DEVICE — SUTURE PDS II SZ 0 L36IN ABSRB VLT L36MM CT-1 1/2 CIR Z346H

## (undated) DEVICE — BLADE,CARBON-STEEL,15,STRL,DISPOSABLE,TB: Brand: MEDLINE

## (undated) DEVICE — STRAP,CATHETER,ELASTIC,HOOK&LOOP: Brand: MEDLINE

## (undated) DEVICE — HANDPIECE SET WITH COAXIAL HIGH FLOW TIP AND SUCTION TUBE: Brand: INTERPULSE

## (undated) DEVICE — 3M™ STERI-DRAPE™ INCISE DRAPE 1050 (60CM X 45CM): Brand: STERI-DRAPE™